# Patient Record
Sex: MALE | Race: WHITE | HISPANIC OR LATINO | Employment: OTHER | URBAN - METROPOLITAN AREA
[De-identification: names, ages, dates, MRNs, and addresses within clinical notes are randomized per-mention and may not be internally consistent; named-entity substitution may affect disease eponyms.]

---

## 2023-04-19 ENCOUNTER — APPOINTMENT (EMERGENCY)
Dept: RADIOLOGY | Facility: HOSPITAL | Age: 83
End: 2023-04-19

## 2023-04-19 ENCOUNTER — APPOINTMENT (EMERGENCY)
Dept: CT IMAGING | Facility: HOSPITAL | Age: 83
End: 2023-04-19

## 2023-04-19 ENCOUNTER — HOSPITAL ENCOUNTER (EMERGENCY)
Facility: HOSPITAL | Age: 83
Discharge: HOME | End: 2023-04-20
Attending: EMERGENCY MEDICINE | Admitting: EMERGENCY MEDICINE

## 2023-04-19 DIAGNOSIS — S09.90XA CLOSED HEAD INJURY, INITIAL ENCOUNTER: Primary | ICD-10-CM

## 2023-04-19 LAB
ANION GAP SERPL CALCULATED.3IONS-SCNC: 7 MMOL/L (ref 4–13)
BASOPHILS # BLD AUTO: 0.06 THOUSANDS/ΜL (ref 0–0.1)
BASOPHILS NFR BLD AUTO: 1 % (ref 0–1)
BUN SERPL-MCNC: 12 MG/DL (ref 5–25)
CALCIUM SERPL-MCNC: 10 MG/DL (ref 8.4–10.2)
CHLORIDE SERPL-SCNC: 103 MMOL/L (ref 96–108)
CO2 SERPL-SCNC: 30 MMOL/L (ref 21–32)
CREAT SERPL-MCNC: 0.85 MG/DL (ref 0.6–1.3)
EOSINOPHIL # BLD AUTO: 0.28 THOUSAND/ΜL (ref 0–0.61)
EOSINOPHIL NFR BLD AUTO: 4 % (ref 0–6)
ERYTHROCYTE [DISTWIDTH] IN BLOOD BY AUTOMATED COUNT: 13.9 % (ref 11.6–15.1)
GFR SERPL CREATININE-BSD FRML MDRD: 81 ML/MIN/1.73SQ M
GLUCOSE SERPL-MCNC: 99 MG/DL (ref 65–140)
HCT VFR BLD AUTO: 44.6 % (ref 36.5–49.3)
HGB BLD-MCNC: 14.5 G/DL (ref 12–17)
IMM GRANULOCYTES # BLD AUTO: 0.05 THOUSAND/UL (ref 0–0.2)
IMM GRANULOCYTES NFR BLD AUTO: 1 % (ref 0–2)
LYMPHOCYTES # BLD AUTO: 1.31 THOUSANDS/ΜL (ref 0.6–4.47)
LYMPHOCYTES NFR BLD AUTO: 17 % (ref 14–44)
MCH RBC QN AUTO: 28.7 PG (ref 26.8–34.3)
MCHC RBC AUTO-ENTMCNC: 32.5 G/DL (ref 31.4–37.4)
MCV RBC AUTO: 88 FL (ref 82–98)
MONOCYTES # BLD AUTO: 0.65 THOUSAND/ΜL (ref 0.17–1.22)
MONOCYTES NFR BLD AUTO: 8 % (ref 4–12)
NEUTROPHILS # BLD AUTO: 5.59 THOUSANDS/ΜL (ref 1.85–7.62)
NEUTS SEG NFR BLD AUTO: 69 % (ref 43–75)
NRBC BLD AUTO-RTO: 0 /100 WBCS
PLATELET # BLD AUTO: 207 THOUSANDS/UL (ref 149–390)
PMV BLD AUTO: 10.3 FL (ref 8.9–12.7)
POTASSIUM SERPL-SCNC: 4.1 MMOL/L (ref 3.5–5.3)
RBC # BLD AUTO: 5.06 MILLION/UL (ref 3.88–5.62)
SODIUM SERPL-SCNC: 140 MMOL/L (ref 135–147)
WBC # BLD AUTO: 7.94 THOUSAND/UL (ref 4.31–10.16)

## 2023-04-19 RX ADMIN — TETANUS TOXOID, REDUCED DIPHTHERIA TOXOID AND ACELLULAR PERTUSSIS VACCINE, ADSORBED 0.5 ML: 5; 2.5; 8; 8; 2.5 SUSPENSION INTRAMUSCULAR at 22:13

## 2023-04-20 VITALS
RESPIRATION RATE: 20 BRPM | WEIGHT: 147.49 LBS | OXYGEN SATURATION: 99 % | HEART RATE: 57 BPM | SYSTOLIC BLOOD PRESSURE: 185 MMHG | DIASTOLIC BLOOD PRESSURE: 91 MMHG | TEMPERATURE: 97.6 F

## 2023-04-20 NOTE — ED PROVIDER NOTES
Emergency Department Trauma Note  Tiana Levy 81 y o  male MRN: [id number]  Unit/Bed#: ED-32/ED-32 Encounter: [id number]      Trauma Alert: Trauma Acuity: C  Model of Arrival:   via    Trauma Team: Current Providers  Attending Provider: Josy Balderrama DO  Registered Nurse: Zunilda Ashton RN  Consultants:     None      History of Present Illness     Chief Complaint:   Chief Complaint   Patient presents with   • Trauma     Look at donal     HPI:  Tiana Levy is a 81 y o  male who presents with fall from standing, dementia patient from nursing home  Struck head on the ground w/o LOC, takes aspirin daily hematoma to R forehead  At baseline mental status, GCS 14     Mechanism:Details of Incident: pt fell from standing at facility +HS hematoma on right forehead  -LOC GCS 14 hs of demienta +ASA DNR/DNI/NH Injury Date: 04/19/23        HPI  Review of Systems   Unable to perform ROS: Dementia   All other systems reviewed and are negative  Historical Information     Immunizations:   Immunization History   Administered Date(s) Administered   • Tdap 04/19/2023       No past medical history on file  No family history on file  No past surgical history on file  No existing history information found  No existing history information found  Family History: No family history on file  Meds/Allergies   None       Not on File    PHYSICAL EXAM    PE limited by: dementia    Objective   Vitals:   First set: Temperature: 97 6 °F (36 4 °C) (04/19/23 2131)  Pulse: 60 (04/19/23 2130)  Respirations: 20 (04/19/23 2131)  Blood Pressure: (!) 202/106 (04/19/23 2130)  SpO2: 96 % (04/19/23 2130)    Primary Survey:   (A) Airway: intact  (B) Breathing: cta b/l  (C) Circulation: Pulses:   normal  (D) Disabliity:  GCS Total:  14  (E) Expose:  Completed    Secondary Survey: (Click on Physical Exam tab above)  Physical Exam  Vitals and nursing note reviewed  Constitutional:       General: He is not in acute distress  Appearance: Normal appearance  He is normal weight  He is not ill-appearing, toxic-appearing or diaphoretic  HENT:      Head: Normocephalic  Comments: Right forehead/scalp hematoma  Right Ear: External ear normal       Left Ear: External ear normal       Nose: Nose normal  No congestion or rhinorrhea  Mouth/Throat:      Mouth: Mucous membranes are moist       Pharynx: Oropharynx is clear  Eyes:      General: No scleral icterus  Right eye: No discharge  Left eye: No discharge  Conjunctiva/sclera: Conjunctivae normal    Cardiovascular:      Rate and Rhythm: Normal rate and regular rhythm  Pulses: Normal pulses  Heart sounds: Normal heart sounds  Pulmonary:      Effort: Pulmonary effort is normal  No respiratory distress  Breath sounds: Normal breath sounds  Abdominal:      General: Abdomen is flat  Palpations: Abdomen is soft  Tenderness: There is no abdominal tenderness  Musculoskeletal:         General: No swelling  Normal range of motion  Cervical back: Normal range of motion and neck supple  Skin:     General: Skin is warm and dry  Capillary Refill: Capillary refill takes less than 2 seconds  Neurological:      General: No focal deficit present  Mental Status: He is alert  Mental status is at baseline  Cranial Nerves: No cranial nerve deficit  Sensory: No sensory deficit  Motor: No weakness  Coordination: Coordination normal       Comments: GCS 14, disoriented - baseline  Psychiatric:         Mood and Affect: Mood normal          Behavior: Behavior normal          Cervical spine cleared by clinical criteria?  No (imaging required)      Invasive Devices     None                 Lab Results:   Results Reviewed     Procedure Component Value Units Date/Time    Basic metabolic panel [[id number]] Collected: 04/19/23 2150    Lab Status: Final result Specimen: Blood from Arm, Right Updated: 04/19/23 2215     Sodium 140 mmol/L      Potassium 4 1 mmol/L      Chloride 103 mmol/L      CO2 30 mmol/L      ANION GAP 7 mmol/L      BUN 12 mg/dL      Creatinine 0 85 mg/dL      Glucose 99 mg/dL      Calcium 10 0 mg/dL      eGFR 81 ml/min/1 73sq m     Narrative:      [place] guidelines for Chronic Kidney Disease (CKD):   •  Stage 1 with normal or high GFR (GFR > 90 mL/min/1 73 square meters)  •  Stage 2 Mild CKD (GFR = 60-89 mL/min/1 73 square meters)  •  Stage 3A Moderate CKD (GFR = 45-59 mL/min/1 73 square meters)  •  Stage 3B Moderate CKD (GFR = 30-44 mL/min/1 73 square meters)  •  Stage 4 Severe CKD (GFR = 15-29 mL/min/1 73 square meters)  •  Stage 5 End Stage CKD (GFR <15 mL/min/1 73 square meters)  Note: GFR calculation is accurate only with a steady state creatinine    CBC and differential [[id number]] Collected: 04/19/23 2150    Lab Status: Final result Specimen: Blood from Arm, Right Updated: 04/19/23 2156     WBC 7 94 Thousand/uL      RBC 5 06 Million/uL      Hemoglobin 14 5 g/dL      Hematocrit 44 6 %      MCV 88 fL      MCH 28 7 pg      MCHC 32 5 g/dL      RDW 13 9 %      MPV 10 3 fL      Platelets 595 Thousands/uL      nRBC 0 /100 WBCs      Neutrophils Relative 69 %      Immat GRANS % 1 %      Lymphocytes Relative 17 %      Monocytes Relative 8 %      Eosinophils Relative 4 %      Basophils Relative 1 %      Neutrophils Absolute 5 59 Thousands/µL      Immature Grans Absolute 0 05 Thousand/uL      Lymphocytes Absolute 1 31 Thousands/µL      Monocytes Absolute 0 65 Thousand/µL      Eosinophils Absolute 0 28 Thousand/µL      Basophils Absolute 0 06 Thousands/µL                  Imaging Studies:   Direct to CT: Yes  XR shoulder 2+ views LEFT   ED Interpretation by Herminia Saenz DO (04/19 2342)   No acute abnormalities  Final Result by Rachana Hannah MD (04/20 0830)   No acute osseous abnormality            Workstation performed: [id number]         CT head without contrast   Final Result by Juan Tong Zarina Higuera MD (04/19 2324)      No acute intracranial abnormality  Workstation performed: [id number]         CT cervical spine without contrast   Final Result by Roselyn Hill MD (04/19 2321)      No cervical spine fracture or traumatic malalignment  Workstation performed: [id number]         XR chest 1 view portable   ED Interpretation by Julieta Gama DO (04/19 7389)   No acute abnormalities  Final Result by Roseline Helton MD (04/20 4420)   No acute cardiopulmonary findings  Workstation performed: [id number]               Procedures  Procedures         ED Course  ED Course as of 04/20/23 1907 Wed Apr 19, 2023   2306 Facility called requesting x-ray of his left shoulder, as they seem to feel he had pain in this region at the facility  For me, he does not seem to have left shoulder pain or deformity, however per their request will obtain x-ray  Medical Decision Making  80 yo M w/ ground level fall, head strike on aspirin  Trauma C activation  R forehead/scalp hematoma  CT head, c spine negative for acute traumatic injuries  CXR and L shoulder xray also normal  Stable for d/c back to his facility  Closed head injury, initial encounter: acute illness or injury  Amount and/or Complexity of Data Reviewed  Independent Historian: EMS  Labs: ordered  Radiology: ordered and independent interpretation performed  Risk  Prescription drug management                    Disposition  [facility] Transfer: No  Final diagnoses:   Closed head injury, initial encounter     Time reflects when diagnosis was documented in both MDM as applicable and the Disposition within this note     Time User Action Codes Description Comment    4/19/2023 11:43 PM Arielle Laws Add [S09 [id number]] Closed head injury, initial encounter       ED Disposition     ED Disposition   Discharge    Condition   Stable    Date/Time   Wed Apr 19, 2023 11:42 PM    Comment   Honey Kirk discharge to home/self care  Follow-up Information     Follow up With Specialties Details Why Ian, [address], Internal Medicine   [facility] [address] [phone]          There are no discharge medications for this patient  No discharge procedures on file      PDMP Review     None          ED Provider  Electronically Signed by         Julieta Gama DO  04/20/23 9538

## 2023-04-20 NOTE — ED NOTES
Pt roundtrip has been set up   And will be picked up at [address] [facility]  04/20/23 9050

## 2023-04-22 LAB
ATRIAL RATE: 60 BPM
P AXIS: 73 DEGREES
PR INTERVAL: 144 MS
QRS AXIS: -65 DEGREES
QRSD INTERVAL: 78 MS
QT INTERVAL: 456 MS
QTC INTERVAL: 456 MS
T WAVE AXIS: 68 DEGREES
VENTRICULAR RATE: 60 BPM

## 2023-06-30 ENCOUNTER — HOSPITAL ENCOUNTER (INPATIENT)
Facility: HOSPITAL | Age: 83
LOS: 4 days | Discharge: NURSING, REHAB OR LONG-TERM CARE | DRG: 871 | End: 2023-07-04
Attending: EMERGENCY MEDICINE | Admitting: FAMILY MEDICINE
Payer: MEDICARE

## 2023-06-30 ENCOUNTER — APPOINTMENT (EMERGENCY)
Dept: RADIOLOGY | Facility: HOSPITAL | Age: 83
DRG: 871 | End: 2023-06-30
Payer: MEDICARE

## 2023-06-30 DIAGNOSIS — A41.9 SEVERE SEPSIS (HCC): ICD-10-CM

## 2023-06-30 DIAGNOSIS — N39.0 UTI (URINARY TRACT INFECTION): ICD-10-CM

## 2023-06-30 DIAGNOSIS — R41.82 ALTERED MENTAL STATUS: Primary | ICD-10-CM

## 2023-06-30 DIAGNOSIS — H10.9 CONJUNCTIVITIS: ICD-10-CM

## 2023-06-30 DIAGNOSIS — R65.20 SEVERE SEPSIS (HCC): ICD-10-CM

## 2023-06-30 DIAGNOSIS — J18.9 PNEUMONIA: ICD-10-CM

## 2023-06-30 PROBLEM — G93.40 ENCEPHALOPATHY: Status: ACTIVE | Noted: 2023-06-30

## 2023-06-30 PROBLEM — I10 ESSENTIAL HYPERTENSION: Status: ACTIVE | Noted: 2023-06-30

## 2023-06-30 PROBLEM — Z71.89 GOALS OF CARE, COUNSELING/DISCUSSION: Status: ACTIVE | Noted: 2023-06-30

## 2023-06-30 PROBLEM — N30.00 ACUTE CYSTITIS WITHOUT HEMATURIA: Status: ACTIVE | Noted: 2023-06-30

## 2023-06-30 PROBLEM — F03.C0 SEVERE DEMENTIA (HCC): Status: ACTIVE | Noted: 2023-06-30

## 2023-06-30 PROBLEM — R91.8 HILAR MASS: Status: ACTIVE | Noted: 2023-06-30

## 2023-06-30 PROBLEM — J96.01 ACUTE RESPIRATORY FAILURE WITH HYPOXIA (HCC): Status: ACTIVE | Noted: 2023-06-30

## 2023-06-30 LAB
2HR DELTA HS TROPONIN: 1 NG/L
ALBUMIN SERPL BCP-MCNC: 3.7 G/DL (ref 3.5–5)
ALP SERPL-CCNC: 59 U/L (ref 34–104)
ALT SERPL W P-5'-P-CCNC: 7 U/L (ref 7–52)
ANION GAP SERPL CALCULATED.3IONS-SCNC: 10 MMOL/L
APTT PPP: 30 SECONDS (ref 23–37)
AST SERPL W P-5'-P-CCNC: 14 U/L (ref 13–39)
BACTERIA UR QL AUTO: ABNORMAL /HPF
BASOPHILS # BLD MANUAL: 0 THOUSAND/UL (ref 0–0.1)
BASOPHILS NFR MAR MANUAL: 0 % (ref 0–1)
BILIRUB SERPL-MCNC: 1.36 MG/DL (ref 0.2–1)
BILIRUB UR QL STRIP: ABNORMAL
BUN SERPL-MCNC: 21 MG/DL (ref 5–25)
CALCIUM SERPL-MCNC: 10.2 MG/DL (ref 8.4–10.2)
CARDIAC TROPONIN I PNL SERPL HS: 10 NG/L
CARDIAC TROPONIN I PNL SERPL HS: 11 NG/L
CHLORIDE SERPL-SCNC: 100 MMOL/L (ref 96–108)
CLARITY UR: ABNORMAL
CO2 SERPL-SCNC: 27 MMOL/L (ref 21–32)
COLOR UR: ABNORMAL
CREAT SERPL-MCNC: 0.91 MG/DL (ref 0.6–1.3)
EOSINOPHIL # BLD MANUAL: 0 THOUSAND/UL (ref 0–0.4)
EOSINOPHIL NFR BLD MANUAL: 0 % (ref 0–6)
ERYTHROCYTE [DISTWIDTH] IN BLOOD BY AUTOMATED COUNT: 13.7 % (ref 11.6–15.1)
FLUAV RNA RESP QL NAA+PROBE: NEGATIVE
FLUBV RNA RESP QL NAA+PROBE: NEGATIVE
GFR SERPL CREATININE-BSD FRML MDRD: 78 ML/MIN/1.73SQ M
GLUCOSE SERPL-MCNC: 147 MG/DL (ref 65–140)
GLUCOSE SERPL-MCNC: 158 MG/DL (ref 65–140)
GLUCOSE UR STRIP-MCNC: NEGATIVE MG/DL
HCT VFR BLD AUTO: 44.3 % (ref 36.5–49.3)
HGB BLD-MCNC: 14.7 G/DL (ref 12–17)
HGB UR QL STRIP.AUTO: ABNORMAL
INR PPP: 1.18 (ref 0.84–1.19)
KETONES UR STRIP-MCNC: ABNORMAL MG/DL
LACTATE SERPL-SCNC: 1.6 MMOL/L (ref 0.5–2)
LACTATE SERPL-SCNC: 2.6 MMOL/L (ref 0.5–2)
LACTATE SERPL-SCNC: 3.4 MMOL/L (ref 0.5–2)
LEUKOCYTE ESTERASE UR QL STRIP: ABNORMAL
LYMPHOCYTES # BLD AUTO: 0.39 THOUSAND/UL (ref 0.6–4.47)
LYMPHOCYTES # BLD AUTO: 3 % (ref 14–44)
MAGNESIUM SERPL-MCNC: 1.9 MG/DL (ref 1.9–2.7)
MCH RBC QN AUTO: 29.5 PG (ref 26.8–34.3)
MCHC RBC AUTO-ENTMCNC: 33.2 G/DL (ref 31.4–37.4)
MCV RBC AUTO: 89 FL (ref 82–98)
MONOCYTES # BLD AUTO: 0.13 THOUSAND/UL (ref 0–1.22)
MONOCYTES NFR BLD: 1 % (ref 4–12)
NEUTROPHILS # BLD MANUAL: 12.45 THOUSAND/UL (ref 1.85–7.62)
NEUTS BAND NFR BLD MANUAL: 14 % (ref 0–8)
NEUTS SEG NFR BLD AUTO: 82 % (ref 43–75)
NITRITE UR QL STRIP: POSITIVE
NON-SQ EPI CELLS URNS QL MICRO: ABNORMAL /HPF
PH UR STRIP.AUTO: 8 [PH]
PLATELET # BLD AUTO: 236 THOUSANDS/UL (ref 149–390)
PLATELET BLD QL SMEAR: ADEQUATE
PMV BLD AUTO: 10.8 FL (ref 8.9–12.7)
POTASSIUM SERPL-SCNC: 4.2 MMOL/L (ref 3.5–5.3)
PROCALCITONIN SERPL-MCNC: 0.42 NG/ML
PROT SERPL-MCNC: 7.1 G/DL (ref 6.4–8.4)
PROT UR STRIP-MCNC: ABNORMAL MG/DL
PROTHROMBIN TIME: 15.2 SECONDS (ref 11.6–14.5)
RBC # BLD AUTO: 4.99 MILLION/UL (ref 3.88–5.62)
RBC #/AREA URNS AUTO: ABNORMAL /HPF
RBC MORPH BLD: NORMAL
RSV RNA RESP QL NAA+PROBE: NEGATIVE
SARS-COV-2 RNA RESP QL NAA+PROBE: NEGATIVE
SODIUM SERPL-SCNC: 137 MMOL/L (ref 135–147)
SP GR UR STRIP.AUTO: 1.01 (ref 1–1.03)
TSH SERPL DL<=0.05 MIU/L-ACNC: 1.54 UIU/ML (ref 0.45–4.5)
UROBILINOGEN UR QL STRIP.AUTO: 1 E.U./DL
WBC # BLD AUTO: 12.97 THOUSAND/UL (ref 4.31–10.16)
WBC #/AREA URNS AUTO: ABNORMAL /HPF

## 2023-06-30 PROCEDURE — 87449 NOS EACH ORGANISM AG IA: CPT | Performed by: FAMILY MEDICINE

## 2023-06-30 PROCEDURE — 94640 AIRWAY INHALATION TREATMENT: CPT

## 2023-06-30 PROCEDURE — 71045 X-RAY EXAM CHEST 1 VIEW: CPT

## 2023-06-30 PROCEDURE — 82948 REAGENT STRIP/BLOOD GLUCOSE: CPT

## 2023-06-30 PROCEDURE — G1004 CDSM NDSC: HCPCS

## 2023-06-30 PROCEDURE — 71250 CT THORAX DX C-: CPT

## 2023-06-30 PROCEDURE — 0241U HB NFCT DS VIR RESP RNA 4 TRGT: CPT | Performed by: EMERGENCY MEDICINE

## 2023-06-30 PROCEDURE — 87040 BLOOD CULTURE FOR BACTERIA: CPT | Performed by: EMERGENCY MEDICINE

## 2023-06-30 PROCEDURE — 87186 SC STD MICRODIL/AGAR DIL: CPT | Performed by: EMERGENCY MEDICINE

## 2023-06-30 PROCEDURE — 84443 ASSAY THYROID STIM HORMONE: CPT | Performed by: EMERGENCY MEDICINE

## 2023-06-30 PROCEDURE — 87077 CULTURE AEROBIC IDENTIFY: CPT | Performed by: EMERGENCY MEDICINE

## 2023-06-30 PROCEDURE — 85027 COMPLETE CBC AUTOMATED: CPT | Performed by: EMERGENCY MEDICINE

## 2023-06-30 PROCEDURE — 96361 HYDRATE IV INFUSION ADD-ON: CPT

## 2023-06-30 PROCEDURE — 96365 THER/PROPH/DIAG IV INF INIT: CPT

## 2023-06-30 PROCEDURE — 83605 ASSAY OF LACTIC ACID: CPT | Performed by: FAMILY MEDICINE

## 2023-06-30 PROCEDURE — 94668 MNPJ CHEST WALL SBSQ: CPT

## 2023-06-30 PROCEDURE — 87086 URINE CULTURE/COLONY COUNT: CPT | Performed by: EMERGENCY MEDICINE

## 2023-06-30 PROCEDURE — 83735 ASSAY OF MAGNESIUM: CPT | Performed by: EMERGENCY MEDICINE

## 2023-06-30 PROCEDURE — 85007 BL SMEAR W/DIFF WBC COUNT: CPT | Performed by: EMERGENCY MEDICINE

## 2023-06-30 PROCEDURE — 36415 COLL VENOUS BLD VENIPUNCTURE: CPT | Performed by: EMERGENCY MEDICINE

## 2023-06-30 PROCEDURE — 84484 ASSAY OF TROPONIN QUANT: CPT | Performed by: EMERGENCY MEDICINE

## 2023-06-30 PROCEDURE — 80053 COMPREHEN METABOLIC PANEL: CPT | Performed by: EMERGENCY MEDICINE

## 2023-06-30 PROCEDURE — 81001 URINALYSIS AUTO W/SCOPE: CPT | Performed by: EMERGENCY MEDICINE

## 2023-06-30 PROCEDURE — 85730 THROMBOPLASTIN TIME PARTIAL: CPT | Performed by: EMERGENCY MEDICINE

## 2023-06-30 PROCEDURE — 84145 PROCALCITONIN (PCT): CPT | Performed by: FAMILY MEDICINE

## 2023-06-30 PROCEDURE — 94669 MECHANICAL CHEST WALL OSCILL: CPT

## 2023-06-30 PROCEDURE — 74176 CT ABD & PELVIS W/O CONTRAST: CPT

## 2023-06-30 PROCEDURE — 85610 PROTHROMBIN TIME: CPT | Performed by: EMERGENCY MEDICINE

## 2023-06-30 PROCEDURE — 93005 ELECTROCARDIOGRAM TRACING: CPT

## 2023-06-30 PROCEDURE — 70450 CT HEAD/BRAIN W/O DYE: CPT

## 2023-06-30 PROCEDURE — 87081 CULTURE SCREEN ONLY: CPT | Performed by: FAMILY MEDICINE

## 2023-06-30 PROCEDURE — 83605 ASSAY OF LACTIC ACID: CPT | Performed by: EMERGENCY MEDICINE

## 2023-06-30 PROCEDURE — 94760 N-INVAS EAR/PLS OXIMETRY 1: CPT

## 2023-06-30 PROCEDURE — 99223 1ST HOSP IP/OBS HIGH 75: CPT | Performed by: FAMILY MEDICINE

## 2023-06-30 PROCEDURE — 99285 EMERGENCY DEPT VISIT HI MDM: CPT

## 2023-06-30 RX ORDER — LEVOTHYROXINE SODIUM 0.05 MG/1
50 TABLET ORAL DAILY
COMMUNITY

## 2023-06-30 RX ORDER — DIVALPROEX SODIUM 250 MG/1
250 TABLET, DELAYED RELEASE ORAL EVERY 12 HOURS SCHEDULED
COMMUNITY

## 2023-06-30 RX ORDER — CEFEPIME HYDROCHLORIDE 2 G/50ML
2000 INJECTION, SOLUTION INTRAVENOUS EVERY 12 HOURS
Status: DISCONTINUED | OUTPATIENT
Start: 2023-07-01 | End: 2023-06-30 | Stop reason: SDUPTHER

## 2023-06-30 RX ORDER — SENNA PLUS 8.6 MG/1
1 TABLET ORAL 2 TIMES DAILY
COMMUNITY

## 2023-06-30 RX ORDER — CEFEPIME HYDROCHLORIDE 2 G/50ML
2000 INJECTION, SOLUTION INTRAVENOUS ONCE
Status: COMPLETED | OUTPATIENT
Start: 2023-06-30 | End: 2023-06-30

## 2023-06-30 RX ORDER — VANCOMYCIN HYDROCHLORIDE 1 G/200ML
15 INJECTION, SOLUTION INTRAVENOUS ONCE
Status: DISCONTINUED | OUTPATIENT
Start: 2023-06-30 | End: 2023-06-30 | Stop reason: DRUGHIGH

## 2023-06-30 RX ORDER — IPRATROPIUM BROMIDE AND ALBUTEROL SULFATE 2.5; .5 MG/3ML; MG/3ML
3 SOLUTION RESPIRATORY (INHALATION)
Status: DISCONTINUED | OUTPATIENT
Start: 2023-06-30 | End: 2023-07-04 | Stop reason: HOSPADM

## 2023-06-30 RX ORDER — ACETAMINOPHEN 650 MG/1
650 SUPPOSITORY RECTAL ONCE
Status: COMPLETED | OUTPATIENT
Start: 2023-06-30 | End: 2023-06-30

## 2023-06-30 RX ORDER — DONEPEZIL HYDROCHLORIDE 5 MG/1
5 TABLET, FILM COATED ORAL
COMMUNITY

## 2023-06-30 RX ORDER — LABETALOL 100 MG/1
100 TABLET, FILM COATED ORAL 2 TIMES DAILY
COMMUNITY

## 2023-06-30 RX ORDER — QUETIAPINE FUMARATE 25 MG/1
25 TABLET, FILM COATED ORAL
COMMUNITY

## 2023-06-30 RX ORDER — HEPARIN SODIUM 5000 [USP'U]/ML
5000 INJECTION, SOLUTION INTRAVENOUS; SUBCUTANEOUS EVERY 8 HOURS SCHEDULED
Status: DISCONTINUED | OUTPATIENT
Start: 2023-06-30 | End: 2023-07-04 | Stop reason: HOSPADM

## 2023-06-30 RX ORDER — BISACODYL 10 MG
10 SUPPOSITORY, RECTAL RECTAL DAILY
Status: DISCONTINUED | OUTPATIENT
Start: 2023-06-30 | End: 2023-07-04 | Stop reason: HOSPADM

## 2023-06-30 RX ORDER — IPRATROPIUM BROMIDE AND ALBUTEROL SULFATE 2.5; .5 MG/3ML; MG/3ML
3 SOLUTION RESPIRATORY (INHALATION)
Status: DISCONTINUED | OUTPATIENT
Start: 2023-06-30 | End: 2023-06-30

## 2023-06-30 RX ORDER — ACETAMINOPHEN 650 MG/1
650 SUPPOSITORY RECTAL EVERY 6 HOURS PRN
Status: DISCONTINUED | OUTPATIENT
Start: 2023-06-30 | End: 2023-07-04 | Stop reason: HOSPADM

## 2023-06-30 RX ORDER — AMLODIPINE BESYLATE 2.5 MG/1
2.5 TABLET ORAL DAILY
COMMUNITY
End: 2023-07-04

## 2023-06-30 RX ORDER — ACETAMINOPHEN 325 MG/1
650 TABLET ORAL ONCE
Status: DISCONTINUED | OUTPATIENT
Start: 2023-06-30 | End: 2023-06-30

## 2023-06-30 RX ORDER — SIMVASTATIN 40 MG
40 TABLET ORAL
COMMUNITY

## 2023-06-30 RX ADMIN — ACETAMINOPHEN 650 MG: 650 SUPPOSITORY RECTAL at 12:51

## 2023-06-30 RX ADMIN — VANCOMYCIN HYDROCHLORIDE 1500 MG: 10 INJECTION, POWDER, LYOPHILIZED, FOR SOLUTION INTRAVENOUS at 14:32

## 2023-06-30 RX ADMIN — IPRATROPIUM BROMIDE AND ALBUTEROL SULFATE 3 ML: 2.5; .5 SOLUTION RESPIRATORY (INHALATION) at 19:57

## 2023-06-30 RX ADMIN — SODIUM CHLORIDE 1000 ML: 0.9 INJECTION, SOLUTION INTRAVENOUS at 15:15

## 2023-06-30 RX ADMIN — SODIUM CHLORIDE 1000 ML: 0.9 INJECTION, SOLUTION INTRAVENOUS at 14:00

## 2023-06-30 RX ADMIN — CEFEPIME HYDROCHLORIDE 2000 MG: 2 INJECTION, SOLUTION INTRAVENOUS at 13:24

## 2023-06-30 RX ADMIN — HEPARIN SODIUM 5000 UNITS: 5000 INJECTION INTRAVENOUS; SUBCUTANEOUS at 18:32

## 2023-06-30 RX ADMIN — SODIUM CHLORIDE 1000 ML: 0.9 INJECTION, SOLUTION INTRAVENOUS at 12:31

## 2023-06-30 RX ADMIN — SODIUM CHLORIDE 1000 ML: 0.9 INJECTION, SOLUTION INTRAVENOUS at 14:09

## 2023-06-30 NOTE — PLAN OF CARE
Problem: INFECTION - ADULT  Goal: Absence or prevention of progression during hospitalization  Description: INTERVENTIONS:  - Assess and monitor for signs and symptoms of infection  - Monitor lab/diagnostic results  - Monitor all insertion sites, i.e. indwelling lines, tubes, and drains  - Monitor endotracheal if appropriate and nasal secretions for changes in amount and color  - [place] appropriate cooling/warming therapies per order  - Administer medications as ordered  - Instruct and encourage patient and family to use good hand hygiene technique  - Identify and instruct in appropriate isolation precautions for identified infection/condition  Outcome: Progressing     Problem: Knowledge Deficit  Goal: Patient/family/caregiver demonstrates understanding of disease process, treatment plan, medications, and discharge instructions  Description: Complete learning assessment and assess knowledge base.   Interventions:  - Provide teaching at level of understanding  - Provide teaching via preferred learning methods  Outcome: Progressing

## 2023-06-30 NOTE — ASSESSMENT & PLAN NOTE
Patient with severe dementia and per daughter has been rapidly declining over the last 3 to 4 months  · D/W patient's daughter at bedside that his overall prognosis at this time is guarded. Per discussion with daughter, no aggressive treatments planned.   Continue with antibiotics to see if patient improves but if patient were to decline, daughter wants to be contacted and she will most likely transition him to comfort care/hospice at that time

## 2023-06-30 NOTE — ASSESSMENT & PLAN NOTE
Patient noted to have hilar mass in the left hilum  Per radiology CT scan from [facility] has been requested an addendum will be added  Daughter informed of this finding

## 2023-06-30 NOTE — PROGRESS NOTES
Cem Lino is a 81 y.o. male who is currently ordered Vancomycin IV with management by the Pharmacy Consult service. Relevant clinical data and objective / subjective history reviewed. Vancomycin Assessment:  Indication and Goal AUC/Trough: Pneumonia (goal -600, trough >10)  Clinical Status: 2stable  Micro:   Pending  Renal Function:  SCr: 0.91 mg/dL  CrCl: 56.1 mL/min  Renal replacement: Not on dialysis  Days of Therapy: 1  Current Dose: 1250 mg q24h  Vancomycin Plan:  New Dosing: No change   Estimated AUC:  499 mcg*hr/mL  Estimated Trough: 14.3 mcg/mL  Next Level: 7/1/23 0600  Renal Function Monitoring: Daily BMP and UOP  Pharmacy will continue to follow closely for s/sx of nephrotoxicity, infusion reactions and appropriateness of therapy. BMP and CBC will be ordered per protocol. We will continue to follow the patient’s culture results and clinical progress daily.     Sadaf Toney, Pharmacist

## 2023-06-30 NOTE — ED NOTES
Pt's respirations becoming more sonorous. Daughter and Dr. Sally Miller at bedside and aware.      Lico Yu RN  06/30/23 0923

## 2023-06-30 NOTE — ED PROVIDER NOTES
History  Chief Complaint   Patient presents with   • Altered Mental Status     Pt arrives from [facility] in [place] via EMS. Staff at [place] [facility] pt became nonresponsive 15 min before calling EMS. Pt has hx of dementia but usually can hold a conversation. Pt arrives with tremors that are not baseline. Pt is DNR/DNI per EMS. Upon arrival pt O2 sat on RA upon arrival was 80     36-year-old male presents with decreased responsiveness confused from the nursing home he has a history of dementia. Afebrile no reports of nausea vomiting abdominal pain diarrhea cough or any other symptoms reported. He is DNR/DNI. History provided by:  EMS personnel, nursing home and relative   used: No        Prior to Admission Medications   Prescriptions Last Dose Informant Patient Reported? Taking? QUEtiapine (SEROquel) 25 mg tablet 6/29/2023 at [address] [facility]) Yes Yes   Sig: Take 25 mg by mouth daily at bedtime Give 1 tablet by mouth at bedtime for psychosis   amLODIPine (NORVASC) 2.5 mg tablet 6/30/2023 at [address] ([address]) Yes Yes   Sig: Take 2.5 mg by mouth daily Give 1 tablet by mouth two times a day for HTN   aspirin (ECOTRIN LOW STRENGTH) 81 mg EC tablet 6/30/2023 at 0[address] Facility ([address]) Yes Yes   Sig: Take 81 mg by mouth daily Give 1 tablet by mouth one time a day for CAD   divalproex sodium (DEPAKOTE) 250 mg DR tablet 6/30/2023 at 0[address] Outside Facility (Specify) Yes Yes   Sig: Take 250 mg by mouth every 12 (twelve) hours Give 1 tablet by mouth two times a day for mood regulation.    donepezil (ARICEPT) 5 mg tablet 6/29/2023 at [address] Outside Facility (Specify) Yes Yes   Sig: Take 5 mg by mouth daily at bedtime Give 1 tablet by mouth one time a day for dementia   labetalol (NORMODYNE) 100 mg tablet 6/30/2023 at [address] Outside Facility (Specify) Yes Yes   Sig: Take 100 mg by mouth 2 (two) times a day Give 1 tablet by mouth two times a day for HTN hold for systolic BP less than 151 and pulse less than 60   levothyroxine 50 mcg tablet 6/30/2023 at [address] (Specify) Yes Yes   Sig: Take 50 mcg by mouth daily Give 1 tablet by mouth in the morning for hypothyroidism   senna (SENOKOT) 8.6 MG tablet 6/30/2023 Outside Facility (Specify) Yes Yes   Sig: Take 1 tablet by mouth 2 (two) times a day Give 1 tablet by mouth two times a day for constipation   simvastatin (ZOCOR) 40 mg tablet 6/29/2023 at [address] Outside Facility (Specify) Yes Yes   Sig: Take 40 mg by mouth daily at bedtime Give 1 tablet by mouth one time a day for hyperlipidemia      Facility-Administered Medications: None       History reviewed. No pertinent past medical history. History reviewed. No pertinent surgical history. History reviewed. No pertinent family history. I have reviewed and agree with the history as documented. E-Cigarette/Vaping   • E-Cigarette Use Never User      E-Cigarette/Vaping Substances     Social History     Tobacco Use   • Smoking status: Unknown   Vaping Use   • Vaping Use: Never used   Substance Use Topics   • Alcohol use: Not Currently   • Drug use: Not Currently       Review of Systems   Constitutional: Positive for chills and fever. HENT: Negative. Eyes: Negative. Respiratory: Negative. Cardiovascular: Negative. Gastrointestinal: Negative. Endocrine: Negative. Genitourinary: Negative. Musculoskeletal: Negative. Skin: Negative. Allergic/Immunologic: Negative. Neurological: Positive for weakness. Hematological: Negative. Psychiatric/Behavioral: Positive for confusion and decreased concentration. All other systems reviewed and are negative. Physical Exam  Physical Exam  Vitals and nursing note reviewed. Constitutional:       Appearance: Normal appearance. HENT:      Head: Normocephalic and atraumatic.       Nose: Nose normal.      Mouth/Throat:      Mouth: Mucous membranes are moist.   Eyes:      General: No visual field deficit. Extraocular Movements: Extraocular movements intact. Pupils: Pupils are equal, round, and reactive to light. Cardiovascular:      Rate and Rhythm: Normal rate and regular rhythm. Pulmonary:      Effort: Pulmonary effort is normal.      Breath sounds: Normal breath sounds. Abdominal:      General: Abdomen is flat. Bowel sounds are normal.      Palpations: Abdomen is soft. Musculoskeletal:         General: Normal range of motion. Cervical back: Normal range of motion and neck supple. Skin:     General: Skin is warm. Capillary Refill: Capillary refill takes less than 2 seconds. Neurological:      General: No focal deficit present. Mental Status: He is alert and oriented to person, place, and time. Mental status is at baseline. GCS: GCS eye subscore is 3. GCS verbal subscore is 3. GCS motor subscore is 3. Cranial Nerves: No cranial nerve deficit, dysarthria or facial asymmetry. Comments: Patient is arousable to deep stimulation otherwise nonverbal at the time does not follow commands. Protecting his airway. Psychiatric:         Mood and Affect: Mood normal.         Thought Content:  Thought content normal.         Vital Signs  ED Triage Vitals   Temperature Pulse Respirations Blood Pressure SpO2   06/30/23 1203 06/30/23 1203 06/30/23 1203 06/30/23 1207 06/30/23 1207   (!) 102.3 °F (39.1 °C) 96 20 145/70 (!) 80 %      Temp Source Heart Rate Source Patient Position - Orthostatic VS BP Location FiO2 (%)   06/30/23 1203 06/30/23 1203 06/30/23 1203 06/30/23 1203 --   Tympanic Monitor Lying Right arm       Pain Score       06/30/23 1251       Med Not Given for Pain - for MAR use only           Vitals:    06/30/23 1445 06/30/23 1500 06/30/23 1511 06/30/23 1525   BP: 140/65 117/59 (!) 86/50 105/58   Pulse: 89 84 73 88   Patient Position - Orthostatic VS: Lying Lying Lying Lying         Visual Acuity  Visual Acuity    Flowsheet Row Most Recent Value   L Pupil Size (mm) 3   R Pupil Size (mm) 3          ED Medications  Medications   acetaminophen (TYLENOL) tablet 650 mg (650 mg Oral Not Given 6/30/23 1230)   vancomycin (VANCOCIN) 1500 mg in sodium chloride 0.9% 250 mL IVPB (1,500 mg Intravenous New Bag 6/30/23 1432)   sodium chloride 0.9 % bolus 1,000 mL (1,000 mL Intravenous New Bag 6/30/23 1515)   sodium chloride 0.9 % bolus 1,000 mL (0 mL Intravenous Stopped 6/30/23 1331)   acetaminophen (TYLENOL) rectal suppository 650 mg (650 mg Rectal Given 6/30/23 1251)   cefepime (MAXIPIME) IVPB (premix in dextrose) 2,000 mg 50 mL (0 mg Intravenous Stopped 6/30/23 1354)   sodium chloride 0.9 % bolus 1,000 mL (0 mL Intravenous Stopped 6/30/23 1506)   sodium chloride 0.9 % bolus 1,000 mL (0 mL Intravenous Stopped 6/30/23 1500)       Diagnostic Studies  Results Reviewed     Procedure Component Value Units Date/Time    RBC Morphology Reflex Test [[id number]] Collected: 06/30/23 1230    Lab Status: Final result Specimen: Blood from Arm, Right Updated: 06/30/23 1501    HS Troponin I 2hr [[id number]]  (Normal) Collected: 06/30/23 1413    Lab Status: Final result Specimen: Blood from Arm, Right Updated: 06/30/23 1455     hs TnI 2hr 11 ng/L      Delta 2hr hsTnI 1 ng/L     Lactic acid 2 Hours [[id number]]  (Abnormal) Collected: 06/30/23 1413    Lab Status: Final result Specimen: Blood from Arm, Right Updated: 06/30/23 1454     LACTIC ACID 2.6 mmol/L     Narrative:      Result may be elevated if tourniquet was used during collection.     HS Troponin I 4hr [[id number]]     Lab Status: No result Specimen: Blood     CBC and differential [[id number]]  (Abnormal) Collected: 06/30/23 1230    Lab Status: Final result Specimen: Blood from Arm, Right Updated: 06/30/23 1402     WBC 12.97 Thousand/uL      RBC 4.99 Million/uL      Hemoglobin 14.7 g/dL      Hematocrit 44.3 %      MCV 89 fL      MCH 29.5 pg      MCHC 33.2 g/dL      RDW 13.7 %      MPV 10.8 fL      Platelets 822 Thousands/uL     Narrative: This is an appended report. These results have been appended to a previously verified report. Manual Differential(PHLEBS Do Not Order) [[id number]]  (Abnormal) Collected: 06/30/23 1230    Lab Status: Final result Specimen: Blood from Arm, Right Updated: 06/30/23 1402     Segmented % 82 %      Bands % 14 %      Lymphocytes % 3 %      Monocytes % 1 %      Eosinophils, % 0 %      Basophils % 0 %      Absolute Neutrophils 12.45 Thousand/uL      Lymphocytes Absolute 0.39 Thousand/uL      Monocytes Absolute 0.13 Thousand/uL      Eosinophils Absolute 0.00 Thousand/uL      Basophils Absolute 0.00 Thousand/uL      Total Counted --     RBC Morphology Normal     Platelet Estimate Adequate    Blood culture #2 [[id number]] Collected: 06/30/23 1322    Lab Status: In process Specimen: Blood from Hand, Left Updated: 06/30/23 1402    FLU/RSV/COVID - if FLU/RSV clinically relevant [[id number]]  (Normal) Collected: 06/30/23 1230    Lab Status: Final result Specimen: Nares from Nose Updated: 06/30/23 1352     SARS-CoV-2 Negative     INFLUENZA A PCR Negative     INFLUENZA B PCR Negative     RSV PCR Negative    Narrative:      FOR PEDIATRIC PATIENTS - copy/paste COVID Guidelines URL to browser: [email or web address]    SARS-CoV-2 assay is a Nucleic Acid Amplification assay intended for the  qualitative detection of nucleic acid from SARS-CoV-2 in nasopharyngeal  swabs. Results are for the presumptive identification of SARS-CoV-2 RNA. Positive results are indicative of infection with SARS-CoV-2, the virus  causing COVID-19, but do not rule out bacterial infection or co-infection  with other viruses. Laboratories within the [facility] and its  territories are required to report all positive results to the appropriate  public health authorities.  Negative results do not preclude SARS-CoV-2  infection and should not be used as the sole basis for treatment or other  patient management decisions. Negative results must be combined with  clinical observations, patient history, and epidemiological information. This test has not been FDA cleared or approved. This test has been authorized by FDA under an Emergency Use Authorization  (EUA). This test is only authorized for the duration of time the  declaration that circumstances exist justifying the authorization of the  emergency use of an in vitro diagnostic tests for detection of SARS-CoV-2  virus and/or diagnosis of COVID-19 infection under section 564(b)(1) of  the Act, 21 U. S.C. 508RRT-3(W)(8), unless the authorization is terminated  or revoked sooner. The test has been validated but independent review by FDA  and CLIA is pending. Test performed using [employer] GeneXpert: This RT-PCR assay targets N2,  a region unique to SARS-CoV-2. A conserved region in the E-gene was chosen  for pan-Sarbecovirus detection which includes SARS-CoV-2. According to CMS-2020-01-R, this platform meets the definition of high-throughput technology. Protime-INR [[id number]]  (Abnormal) Collected: 06/30/23 1230    Lab Status: Final result Specimen: Blood from Arm, Right Updated: 06/30/23 1350     Protime 15.2 seconds      INR 1.18    APTT [[id number]]  (Normal) Collected: 06/30/23 1230    Lab Status: Final result Specimen: Blood from Arm, Right Updated: 06/30/23 1350     PTT 30 seconds     Lactic acid, plasma (w/reflex if result > 2.0) [[id number]]  (Abnormal) Collected: 06/30/23 1230    Lab Status: Final result Specimen: Blood from Arm, Right Updated: 06/30/23 1346     LACTIC ACID 3.4 mmol/L     Narrative:      Result may be elevated if tourniquet was used during collection.     Urine Microscopic [[id number]]  (Abnormal) Collected: 06/30/23 1316    Lab Status: Final result Specimen: Urine, Clean Catch Updated: 06/30/23 1330     RBC, UA       Field obscured, unable to enumerate     /hpf     WBC, UA Innumerable /hpf      Epithelial Cells       Field obscured, unable to enumerate     /hpf     Bacteria, UA Innumerable /hpf     TSH [[id number]]  (Normal) Collected: 06/30/23 1230    Lab Status: Final result Specimen: Blood from Arm, Right Updated: 06/30/23 1326     TSH 3RD GENERATON 1.544 uIU/mL     Urine culture [[id number]] Collected: 06/30/23 1316    Lab Status:  In process Specimen: Urine, Clean Catch Updated: 06/30/23 1326    UA (URINE) with reflex to Scope [[id number]]  (Abnormal) Collected: 06/30/23 1316    Lab Status: Final result Specimen: Urine, Clean Catch Updated: 06/30/23 1323     Color, UA Latisha     Clarity, UA Cloudy     Specific Gravity, UA 1.015     pH, UA 8.0     Leukocytes, UA Small     Nitrite, UA Positive     Protein,  (2+) mg/dl      Glucose, UA Negative mg/dl      Ketones, UA 15 (1+) mg/dl      Urobilinogen, UA 1.0 E.U./dl      Bilirubin, UA Small     Occult Blood, UA Moderate    HS Troponin 0hr (reflex protocol) [[id number]]  (Normal) Collected: 06/30/23 1230    Lab Status: Final result Specimen: Blood from Arm, Right Updated: 06/30/23 1319     hs TnI 0hr 10 ng/L     Comprehensive metabolic panel [[id number]]  (Abnormal) Collected: 06/30/23 1230    Lab Status: Final result Specimen: Blood from Arm, Right Updated: 06/30/23 1310     Sodium 137 mmol/L      Potassium 4.2 mmol/L      Chloride 100 mmol/L      CO2 27 mmol/L      ANION GAP 10 mmol/L      BUN 21 mg/dL      Creatinine 0.91 mg/dL      Glucose 147 mg/dL      Calcium 10.2 mg/dL      AST 14 U/L      ALT 7 U/L      Alkaline Phosphatase 59 U/L      Total Protein 7.1 g/dL      Albumin 3.7 g/dL      Total Bilirubin 1.36 mg/dL      eGFR 78 ml/min/1.73sq m     Narrative:      [place] guidelines for Chronic Kidney Disease (CKD):   •  Stage 1 with normal or high GFR (GFR > 90 mL/min/1.73 square meters)  •  Stage 2 Mild CKD (GFR = 60-89 mL/min/1.73 square meters)  •  Stage 3A Moderate CKD (GFR = 45-59 mL/min/1.73 square meters)  •  Stage 3B Moderate CKD (GFR = 30-44 mL/min/1.73 square meters)  •  Stage 4 Severe CKD (GFR = 15-29 mL/min/1.73 square meters)  •  Stage 5 End Stage CKD (GFR <15 mL/min/1.73 square meters)  Note: GFR calculation is accurate only with a steady state creatinine    Magnesium [[id number]]  (Normal) Collected: 06/30/23 1230    Lab Status: Final result Specimen: Blood from Arm, Right Updated: 06/30/23 1310     Magnesium 1.9 mg/dL     Blood culture #1 [[id number]] Collected: 06/30/23 1234    Lab Status: In process Specimen: Blood from Arm, Left Updated: 06/30/23 1244    Fingerstick Glucose (POCT) [[id number]]  (Abnormal) Collected: 06/30/23 1209    Lab Status: Final result Updated: 06/30/23 1214     POC Glucose 158 mg/dl                  CT head without contrast   Final Result by Amada Muñoz MD (06/30 1436)      No acute intracranial abnormality.                   Workstation performed: [id number]         XR chest 1 view portable   ED Interpretation by Leonel Luevano DO (06/30 1250)   Left lower infiltrate      Final Result by Celi Pfeiffer MD (06/30 1302)      Development of suspected left basal infiltrate               Workstation performed: [id number]         CT chest abdomen pelvis wo contrast    (Results Pending)              Procedures  ECG 12 Lead Documentation Only    Date/Time: 6/30/2023 3:33 PM    Performed by: Leonel Luevano DO  Authorized by: Leonel Luevano DO    ECG reviewed by me, the ED Provider: yes    Patient location:  ED  Previous ECG:     Previous ECG:  Unavailable    Comparison to cardiac monitor: Yes    Interpretation:     Interpretation: non-specific    Rate:     ECG rate assessment: normal    Rhythm:     Rhythm: sinus rhythm    Ectopy:     Ectopy: none    QRS:     QRS axis:  Normal  Conduction:     Conduction: normal    ST segments:     ST segments:  Non-specific  T waves:     T waves: non-specific    CriticalCare Time    Date/Time: 6/30/2023 3:34 PM    Performed by: Leonel Luevano DO  Authorized by: Leonel Luevano DO    Critical care provider statement:     Critical care time (minutes):  45    Critical care was necessary to treat or prevent imminent or life-threatening deterioration of the following conditions:  Metabolic crisis    Critical care was time spent personally by me on the following activities:  Blood draw for specimens, obtaining history from patient or surrogate, development of treatment plan with patient or surrogate, discussions with primary provider, evaluation of patient's response to treatment, discussions with consultants, examination of patient, interpretation of cardiac output measurements, ordering and performing treatments and interventions, ordering and review of laboratory studies, ordering and review of radiographic studies, re-evaluation of patient's condition and review of old charts    I assumed direction of critical care for this patient from another provider in my specialty: no               ED Course                               SBIRT 22yo+    Flowsheet Row Most Recent Value   Initial Alcohol Screen: US AUDIT-C     1. How often do you have a drink containing alcohol? 0 Filed at: 06/30/2023 1203   2. How many drinks containing alcohol do you have on a typical day you are drinking? 0 Filed at: 06/30/2023 1203   3a. Male UNDER 65: How often do you have five or more drinks on one occasion? 0 Filed at: 06/30/2023 1203   3b. FEMALE Any Age, or MALE 65+: How often do you have 4 or more drinks on one occassion? 0 Filed at: 06/30/2023 1203   Audit-C Score 0 Filed at: 06/30/2023 1203   JUJU: How many times in the past year have you. .. Used an illegal drug or used a prescription medication for non-medical reasons? Never Filed at: 06/30/2023 1203                    Medical Decision Making  Patient was given IV antibiotics and fluids after 30 cc/kg patient's blood pressure was still tenuous. He was DNR/DNI ICU evaluated the patient at bedside. According to the ICU was discussion with the daughter no aggressive measures wanted. He is to get no central lines no ICU admitted if things get worse on the floor he is to be made comfort care according to the daughter. Altered mental status: acute illness or injury  Pneumonia: acute illness or injury  Severe sepsis [facility]): acute illness or injury  UTI (urinary tract infection): acute illness or injury  Amount and/or Complexity of Data Reviewed  Independent Historian: EMS  External Data Reviewed: labs, radiology, ECG and notes. Labs: ordered. Decision-making details documented in ED Course. Radiology: ordered and independent interpretation performed. Decision-making details documented in ED Course. ECG/medicine tests: ordered and independent interpretation performed. Decision-making details documented in ED Course. Risk  OTC drugs. Prescription drug management. Decision regarding hospitalization. Disposition  Final diagnoses: Altered mental status   Severe sepsis (HCC)   UTI (urinary tract infection)   Pneumonia     Time reflects when diagnosis was documented in both MDM as applicable and the Disposition within this note     Time User Action Codes Description Comment    6/30/2023  2:26 PM Anepu, Catrina Add [R41.82] Altered mental status     6/30/2023  2:26 PM Anepu, Catrina Add [A41.9,  R65.20] Severe sepsis ([address])     6/30/2023  2:26 PM Anepu, Catrina Add [N39.0] UTI (urinary tract infection)     6/30/2023  2:26 PM Anepu, Catrina Add [J18.9] Pneumonia       ED Disposition     ED Disposition   Admit    Condition   Stable    Date/Time   Fri Jun 30, 2023  2:26 PM    Comment               Follow-up Information    None         Patient's Medications   Discharge Prescriptions    No medications on file       No discharge procedures on file.     PDMP Review     None          ED Provider  Electronically Signed by           Alanis Hernández DO  06/30/23 6675

## 2023-06-30 NOTE — ED NOTES
Ricardo Isaacs NP from ICU, at bedside. Approves pt to be transported to the floor.      Emi Schlatter, RN  06/30/23 8477

## 2023-06-30 NOTE — ED NOTES
Pt's respirations more labored and audible secretions are heard. Pt was suctioned with moderate amount obtained. Pt's respirations less labored and audible secretions no longer heard.      Dilip Calderón RN  06/30/23 7203

## 2023-06-30 NOTE — ASSESSMENT & PLAN NOTE
Left-sided pneumonia on imaging  · Received a dose of cefepime and vancomycin in the ER which will be continued for now until cultures result  · Follow-up on pending MRSA screen.   Check sputum culture if possible, urine antigens  · Scheduled nebulizer treatment

## 2023-06-30 NOTE — ASSESSMENT & PLAN NOTE
Per Daughter, patient with severe dementia and is oriented to self only at baseline  Holding all p.o. medications while patient minimally responsive

## 2023-06-30 NOTE — ASSESSMENT & PLAN NOTE
O2 sat of 80% on room air prior ER documentation  Currently on 5 L by nasal cannula  Due to pneumonia +/- pulmonary edema

## 2023-06-30 NOTE — ED NOTES
Unable to straight cath pt using 15 fr. Peds cath 8 fr was used and was successful.      Alcides Rico RN  06/30/23 5243

## 2023-06-30 NOTE — QUICK NOTE
Progress Note - Triage Asssessment   Diamond De La Paz 81 y.o. male MRN: [id number]    Time Called ( Time): 7344  Date Called: 06/30/23  Room#: CT 2   Person requesting evaluation: Dr. Gavin Uriarte    Situation:    Patient came from complete care. He was reported to be minimally responsive by staff. EMS was activated. He does have a history of dementia but can usually hold a conversation per staff. Of note, he is a known DNR/DNI. Of note, his lactic acid was 3.4, WBC 12.9, and he met SIRS criteria for with a fever, tachypnea, and hypoxia. Urinalysis shows evidence of urinary tract infection as evidenced by leukocytes and nitrites. Additionally, CT chest shows  possible pneumonia. As per sepsis protocol, the patient received IV fluids, cefepime, and vancomycin. He continued to have mild drops in his blood pressure with systolics in the 08V but then was fluid responsive. Initially, he was evaluated for the medical surgical floor but there was a concern since his blood pressure dropped less than 90 a few times. ER attending Dr. Gavin Uriarte requested a goals of care conversation to be done by critical care since the patient's blood pressures were borderline and he may require further aggressive care in the future. On exam, patient was found resting comfortably with no signs or symptoms of distress noted. He was minimally responsive, but did respond to painful stimuli. Heart rate was normal sinus rhythm in the 80s. Patient was resting comfortably on 5 L nasal cannula with an oxygen saturation of 98%. At my assessment, his blood pressure was 105/58. Previous blood pressures were noted as follows:  86/50  117/59   140/65  162/74   113/58   133/62    Discussion had with myself and the patient's daughter Ricardo Isaacs. She stated that her  father has been slowly deteriorating.   She says he has suffered from sepsis in the past.  She understands that he has potentially sepsis in both his lungs and his urinary tract.  Discussion was had with her regarding aggressive care in the ICU, central line placement, vasopressors, intubation, etc.      The daughter stated that she was willing for her father to be admitted to the medical floor for IV fluids and antibiotics only. She understands that if her father were to deteriorate further, he would need further aggressive measures to which she refused all of including a central line. It was explained to her further that if her father deteriorates on the floor, the primary team would be advised to call her and at that time she would request to make him comfort care. In the meantime, she stated that she was going to update her other siblings to let them know  the current situation. Update 2 S. charge nurse, and ER attending Dr. Evan Hernandez. Dr. Evan Hernandez updated [facility] attending Dr. Jairo Ahuja who knows that if the patient would get worse, family does not want him to be transferred to ICU, they would want to be notified so they could make him to be comfort care. Interventions:   continue IV antibitoics and IVF         Triage Assessment:     Patient can be admitted to med-surg level of care    Recommendations discussed with Dr. Evan Hernandez, Dr. Paige Norton and Dr. Jairo Ahuja.

## 2023-06-30 NOTE — ASSESSMENT & PLAN NOTE
As noted by fever, tachypnea, leukocytosis, lactic acidosis  · Most likely due to left-sided pneumonia, UTI  · Follow-up pending blood cultures  · Lactic acid normalized with fluid boluses  · Patient with rales along with pulmonary edema on imaging.   Hold off on further fluids unless clinically indicated

## 2023-06-30 NOTE — ED NOTES
Multiple attempts at obtaining second set of blood cultures unsuccessful. Will attempt again prior to giving antibiotics.      Marifer Valle RN  06/30/23 7233

## 2023-06-30 NOTE — H&P
[id number] [facility]  H&P  Name: Miracle Deras 81 y.o. male I MRN: [id number]  Unit/Bed#: 2 [address] Date of Admission: 6/30/2023   Date of Service: 6/30/2023 I Hospital Day: 0      Assessment/Plan   * Severe sepsis [facility])  Assessment & Plan  As noted by fever, tachypnea, leukocytosis, lactic acidosis  · Most likely due to left-sided pneumonia, UTI  · Follow-up pending blood cultures  · Lactic acid normalized with fluid boluses  · Patient with rales along with pulmonary edema on imaging. Hold off on further fluids unless clinically indicated    Acute respiratory failure with hypoxia (HCC)  Assessment & Plan  O2 sat of 80% on room air prior ER documentation  Currently on 5 L by nasal cannula  Due to pneumonia +/- pulmonary edema        Pneumonia  Assessment & Plan  Left-sided pneumonia on imaging  · Received a dose of cefepime and vancomycin in the ER which will be continued for now until cultures result  · Follow-up on pending MRSA screen. Check sputum culture if possible, urine antigens  · Scheduled nebulizer treatment    Goals of care, counseling/discussion  Assessment & Plan  Patient with severe dementia and per daughter has been rapidly declining over the last 3 to 4 months  · D/W patient's daughter at bedside that his overall prognosis at this time is guarded. Per discussion with daughter, no aggressive treatments planned. Continue with antibiotics to see if patient improves but if patient were to decline, daughter wants to be contacted and she will most likely transition him to comfort care/hospice at that time    Acute cystitis without hematuria  Assessment & Plan  Based on UA.   As noted above on cefepime  Follow-up on pending urine culture    Hilar mass  Assessment & Plan  Patient noted to have hilar mass in the left hilum  Per radiology CT scan from [facility] has been requested an addendum will be added  Daughter informed of this finding    Essential hypertension  Assessment & Plan  Hold p.o. medications. Patient did become hypotensive in the ER  Monitor blood pressure    Severe dementia [facility])  Assessment & Plan  Per Daughter, patient with severe dementia and is oriented to self only at baseline  Holding all p.o. medications while patient minimally responsive    Encephalopathy  Assessment & Plan  Patient noted to be only responsive at nursing home. Patient does have baseline dementia but can usually hold a conversation  · Likely due to infection  · CT head negative for acute pathology        VTE Pharmacologic Prophylaxis:   Heparin  Code Status: Level 3 - DNAR and DNI   Discussion with family: Updated  (daughter) at bedside. Anticipated Length of Stay: Patient will be admitted on an inpatient basis with an anticipated length of stay of greater than 2 midnights secondary to Severe sepsis, pneumonia, UTI, encephalopathy. Total Time Spent on Date of Encounter in care of patient: 55 minutes This time was spent on one or more of the following: performing physical exam; counseling and coordination of care; obtaining or reviewing history; documenting in the medical record; reviewing/ordering tests, medications or procedures; communicating with other healthcare professionals and discussing with patient's family/caregivers. Chief Complaint: Lethargy    History of Present Illness:  Miracle Deras is a 81 y.o. male who presents with lethargy from complete care in [place]. Patient unable to provide any information so most of the information was obtained by review of ER records and by speaking with daughter at bedside. Patient was very minimally responsive there and was noted to have some tremors. Patient does have history of dementia and is confused at baseline but can usually have a conversation. Per daughter at bedside patient has been rapidly declining over the last 3 to 4 months and has also started conversating less.   In the ER he was noted to be hypoxic and then became hypotensive. He was evaluated by ICU staff as well. Review of Systems:  Review of Systems   Unable to perform ROS: Mental status change       Past Medical and Surgical History:   History reviewed. No pertinent past medical history. History reviewed. No pertinent surgical history. Meds/Allergies:  Prior to Admission medications    Medication Sig Start Date End Date Taking? Authorizing Provider   amLODIPine (NORVASC) 2.5 mg tablet Take 2.5 mg by mouth daily Give 1 tablet by mouth two times a day for HTN   Yes Historical Provider, MD   aspirin (ECOTRIN LOW STRENGTH) 81 mg EC tablet Take 81 mg by mouth daily Give 1 tablet by mouth one time a day for CAD   Yes Historical Provider, MD   divalproex sodium (DEPAKOTE) 250 mg DR tablet Take 250 mg by mouth every 12 (twelve) hours Give 1 tablet by mouth two times a day for mood regulation. Yes Historical Provider, MD   donepezil (ARICEPT) 5 mg tablet Take 5 mg by mouth daily at bedtime Give 1 tablet by mouth one time a day for dementia   Yes Historical Provider, MD   labetalol (NORMODYNE) 100 mg tablet Take 100 mg by mouth 2 (two) times a day Give 1 tablet by mouth two times a day for HTN hold for systolic BP less than 986 and pulse less than 60   Yes Historical Provider, MD   levothyroxine 50 mcg tablet Take 50 mcg by mouth daily Give 1 tablet by mouth in the morning for hypothyroidism   Yes Historical Provider, MD   QUEtiapine (SEROquel) 25 mg tablet Take 25 mg by mouth daily at bedtime Give 1 tablet by mouth at bedtime for psychosis   Yes Historical Provider, MD   senna (SENOKOT) 8.6 MG tablet Take 1 tablet by mouth 2 (two) times a day Give 1 tablet by mouth two times a day for constipation   Yes Historical Provider, MD   simvastatin (ZOCOR) 40 mg tablet Take 40 mg by mouth daily at bedtime Give 1 tablet by mouth one time a day for hyperlipidemia   Yes Historical Provider, MD     I have reviewed home medications using recent Epic encounter.     Allergies: Allergies   Allergen Reactions   • Shellfish-Derived Products - Food Allergy Other (See Comments)     Not able to assess       Social History:  Marital Status: Single   Occupation: none  Patient Pre-hospital Living Situation: [address]  Patient Pre-hospital Level of Mobility: wheelchair  Patient Pre-hospital Diet Restrictions: none  Substance Use History:   Social History     Substance and Sexual Activity   Alcohol Use Not Currently     Social History     Tobacco Use   Smoking Status Unknown   Smokeless Tobacco Not on file     Social History     Substance and Sexual Activity   Drug Use Not Currently       Family History:  History reviewed. No pertinent family history. Physical Exam:     Vitals:   Blood Pressure: 138/86 (06/30/23 1551)  Pulse: 94 (06/30/23 1551)  Temperature: 97.9 °F (36.6 °C) (06/30/23 1551)  Temp Source: Tympanic (06/30/23 1511)  Respirations: (!) 30 (06/30/23 1525)  Height: 5' 7" (170.2 cm) (06/30/23 1423)  Weight - Scale: 63.9 kg (140 lb 14 oz) (06/30/23 1203)  SpO2: 95 % (06/30/23 1551)    Physical Exam  Vitals reviewed. Constitutional:       General: He is in acute distress. Appearance: He is ill-appearing. Comments: Lethargic   HENT:      Head: Normocephalic and atraumatic. Cardiovascular:      Rate and Rhythm: Normal rate and regular rhythm. Pulmonary:      Breath sounds: Rhonchi and rales present. Comments: Decreased breath sounds bilaterally although patient with poor inspiratory effort. Tachypneic  Abdominal:      General: Bowel sounds are normal. There is no distension. Palpations: Abdomen is soft. Tenderness: There is no abdominal tenderness. Genitourinary:     Comments: Calderon in place  Musculoskeletal:      Right lower leg: No edema. Left lower leg: No edema.           Additional Data:     Lab Results:  Results from last 7 days   Lab Units 06/30/23  1230   WBC Thousand/uL 12.97*   HEMOGLOBIN g/dL 14.7   HEMATOCRIT % 44.3 PLATELETS Thousands/uL 236   BANDS PCT % 14*   LYMPHO PCT % 3*   MONO PCT % 1*   EOS PCT % 0     Results from last 7 days   Lab Units 06/30/23  1230   SODIUM mmol/L 137   POTASSIUM mmol/L 4.2   CHLORIDE mmol/L 100   CO2 mmol/L 27   BUN mg/dL 21   CREATININE mg/dL 0.91   ANION GAP mmol/L 10   CALCIUM mg/dL 10.2   ALBUMIN g/dL 3.7   TOTAL BILIRUBIN mg/dL 1.36*   ALK PHOS U/L 59   ALT U/L 7   AST U/L 14   GLUCOSE RANDOM mg/dL 147*     Results from last 7 days   Lab Units 06/30/23  1230   INR  1.18     Results from last 7 days   Lab Units 06/30/23  1209   POC GLUCOSE mg/dl 158*         Results from last 7 days   Lab Units 06/30/23  1413 06/30/23  1230   LACTIC ACID mmol/L 2.6* 3.4*       Lines/Drains:  Invasive Devices     Peripheral Intravenous Line  Duration           Peripheral IV 06/30/23 Distal;Right;Ventral (anterior) Forearm <1 day    Peripheral IV 06/30/23 Right Antecubital <1 day                    Imaging: Reviewed radiology reports from this admission including: chest CT scan, abdominal/pelvic CT and CT head  CT head without contrast   Final Result by Horton Riedel, MD (06/30 5568)      No acute intracranial abnormality. Workstation performed: [id number]         CT chest abdomen pelvis wo contrast   Final Result by Anamaria Elizabeth MD (06/30 1452)      Asymmetric pulmonary edema on the left, with left basilar consolidation likely aspiration pneumonitis or pneumonia. Fullness in the left hilum with narrowing of the proximal lingular and left lower lobe airways raising concern for a hilar mass. Patient had a chest CT at [facility] dated 1/21/2018 which has been requested for comparison, and a addendum will    be dictated to this report when those images arrive. Coronary artery calcifications which are an indicator of coronary artery disease. Bladder is collapsed, but there is stranding of the fat surrounding the bladder likely due to cystitis.       Fecal impaction. No bowel obstruction. Significant findings were relayed to Dr. Fidelia Mccabe  in the emergency department prior to this dictation. Workstation performed: [id number]         XR chest 1 view portable   ED Interpretation by Lul Bonilla DO (06/30 1250)   Left lower infiltrate      Final Result by Nava Renteria MD (06/30 1302)      Development of suspected left basal infiltrate               Workstation performed: [id number]             EKG and Other Studies Reviewed on Admission:   · EKG: Sinus rhythm with no acute ST elevation. ** Please Note: This note has been constructed using a voice recognition system.  **

## 2023-06-30 NOTE — ASSESSMENT & PLAN NOTE
Patient noted to be only responsive at nursing home.   Patient does have baseline dementia but can usually hold a conversation  · Likely due to infection  · CT head negative for acute pathology

## 2023-07-01 LAB
ALBUMIN SERPL BCP-MCNC: 2.9 G/DL (ref 3.5–5)
ALP SERPL-CCNC: 36 U/L (ref 34–104)
ALT SERPL W P-5'-P-CCNC: 6 U/L (ref 7–52)
ANION GAP SERPL CALCULATED.3IONS-SCNC: 5 MMOL/L
AST SERPL W P-5'-P-CCNC: 14 U/L (ref 13–39)
BILIRUB SERPL-MCNC: 0.59 MG/DL (ref 0.2–1)
BUN SERPL-MCNC: 20 MG/DL (ref 5–25)
CALCIUM ALBUM COR SERPL-MCNC: 9.9 MG/DL (ref 8.3–10.1)
CALCIUM SERPL-MCNC: 9 MG/DL (ref 8.4–10.2)
CHLORIDE SERPL-SCNC: 111 MMOL/L (ref 96–108)
CO2 SERPL-SCNC: 25 MMOL/L (ref 21–32)
CREAT SERPL-MCNC: 0.69 MG/DL (ref 0.6–1.3)
ERYTHROCYTE [DISTWIDTH] IN BLOOD BY AUTOMATED COUNT: 13.9 % (ref 11.6–15.1)
GFR SERPL CREATININE-BSD FRML MDRD: 88 ML/MIN/1.73SQ M
GLUCOSE SERPL-MCNC: 100 MG/DL (ref 65–140)
HCT VFR BLD AUTO: 35 % (ref 36.5–49.3)
HGB BLD-MCNC: 11.6 G/DL (ref 12–17)
L PNEUMO1 AG UR QL IA.RAPID: NEGATIVE
MCH RBC QN AUTO: 29.3 PG (ref 26.8–34.3)
MCHC RBC AUTO-ENTMCNC: 32.3 G/DL (ref 31.4–37.4)
MCV RBC AUTO: 91 FL (ref 82–98)
MRSA NOSE QL CULT: NORMAL
PLATELET # BLD AUTO: 139 THOUSANDS/UL (ref 149–390)
PMV BLD AUTO: 11 FL (ref 8.9–12.7)
POTASSIUM SERPL-SCNC: 3.9 MMOL/L (ref 3.5–5.3)
PROCALCITONIN SERPL-MCNC: 17.46 NG/ML
PROT SERPL-MCNC: 5.5 G/DL (ref 6.4–8.4)
RBC # BLD AUTO: 3.86 MILLION/UL (ref 3.88–5.62)
S PNEUM AG UR QL: NEGATIVE
SODIUM SERPL-SCNC: 141 MMOL/L (ref 135–147)
VANCOMYCIN SERPL-MCNC: 21.1 UG/ML (ref 10–20)
WBC # BLD AUTO: 9.78 THOUSAND/UL (ref 4.31–10.16)

## 2023-07-01 PROCEDURE — 94640 AIRWAY INHALATION TREATMENT: CPT

## 2023-07-01 PROCEDURE — 94669 MECHANICAL CHEST WALL OSCILL: CPT

## 2023-07-01 PROCEDURE — 94668 MNPJ CHEST WALL SBSQ: CPT

## 2023-07-01 PROCEDURE — 94760 N-INVAS EAR/PLS OXIMETRY 1: CPT

## 2023-07-01 PROCEDURE — 84145 PROCALCITONIN (PCT): CPT | Performed by: FAMILY MEDICINE

## 2023-07-01 PROCEDURE — 80053 COMPREHEN METABOLIC PANEL: CPT | Performed by: FAMILY MEDICINE

## 2023-07-01 PROCEDURE — 80202 ASSAY OF VANCOMYCIN: CPT | Performed by: FAMILY MEDICINE

## 2023-07-01 PROCEDURE — 99232 SBSQ HOSP IP/OBS MODERATE 35: CPT | Performed by: FAMILY MEDICINE

## 2023-07-01 PROCEDURE — 85027 COMPLETE CBC AUTOMATED: CPT | Performed by: FAMILY MEDICINE

## 2023-07-01 RX ORDER — SODIUM CHLORIDE, SODIUM LACTATE, POTASSIUM CHLORIDE, CALCIUM CHLORIDE 600; 310; 30; 20 MG/100ML; MG/100ML; MG/100ML; MG/100ML
50 INJECTION, SOLUTION INTRAVENOUS CONTINUOUS
Status: DISCONTINUED | OUTPATIENT
Start: 2023-07-02 | End: 2023-07-02

## 2023-07-01 RX ORDER — CIPROFLOXACIN HYDROCHLORIDE 3.5 MG/ML
1 SOLUTION/ DROPS TOPICAL 4 TIMES DAILY
Status: DISCONTINUED | OUTPATIENT
Start: 2023-07-01 | End: 2023-07-04 | Stop reason: HOSPADM

## 2023-07-01 RX ORDER — CIPROFLOXACIN HYDROCHLORIDE 3.5 MG/ML
1 SOLUTION/ DROPS TOPICAL 4 TIMES DAILY
Status: DISCONTINUED | OUTPATIENT
Start: 2023-07-01 | End: 2023-07-01

## 2023-07-01 RX ADMIN — IPRATROPIUM BROMIDE AND ALBUTEROL SULFATE 3 ML: 2.5; .5 SOLUTION RESPIRATORY (INHALATION) at 20:26

## 2023-07-01 RX ADMIN — HEPARIN SODIUM 5000 UNITS: 5000 INJECTION INTRAVENOUS; SUBCUTANEOUS at 15:16

## 2023-07-01 RX ADMIN — CIPROFLOXACIN 1 DROP: 3 SOLUTION OPHTHALMIC at 16:38

## 2023-07-01 RX ADMIN — CEFEPIME 2000 MG: 2 INJECTION, POWDER, FOR SOLUTION INTRAVENOUS at 01:38

## 2023-07-01 RX ADMIN — HEPARIN SODIUM 5000 UNITS: 5000 INJECTION INTRAVENOUS; SUBCUTANEOUS at 21:15

## 2023-07-01 RX ADMIN — VANCOMYCIN HYDROCHLORIDE 1250 MG: 10 INJECTION, POWDER, LYOPHILIZED, FOR SOLUTION INTRAVENOUS at 02:48

## 2023-07-01 RX ADMIN — CIPROFLOXACIN 1 DROP: 3 SOLUTION OPHTHALMIC at 21:15

## 2023-07-01 RX ADMIN — IPRATROPIUM BROMIDE AND ALBUTEROL SULFATE 3 ML: 2.5; .5 SOLUTION RESPIRATORY (INHALATION) at 07:18

## 2023-07-01 RX ADMIN — BISACODYL 10 MG: 10 SUPPOSITORY RECTAL at 08:54

## 2023-07-01 RX ADMIN — CEFEPIME 2000 MG: 2 INJECTION, POWDER, FOR SOLUTION INTRAVENOUS at 13:00

## 2023-07-01 RX ADMIN — VANCOMYCIN HYDROCHLORIDE 1500 MG: 10 INJECTION, POWDER, LYOPHILIZED, FOR SOLUTION INTRAVENOUS at 15:00

## 2023-07-01 RX ADMIN — IPRATROPIUM BROMIDE AND ALBUTEROL SULFATE 3 ML: 2.5; .5 SOLUTION RESPIRATORY (INHALATION) at 01:07

## 2023-07-01 RX ADMIN — IPRATROPIUM BROMIDE AND ALBUTEROL SULFATE 3 ML: 2.5; .5 SOLUTION RESPIRATORY (INHALATION) at 13:37

## 2023-07-01 RX ADMIN — HEPARIN SODIUM 5000 UNITS: 5000 INJECTION INTRAVENOUS; SUBCUTANEOUS at 06:29

## 2023-07-01 NOTE — ASSESSMENT & PLAN NOTE
O2 sat of 80% on room air prior ER documentation  Currently on 6 L by nasal cannula  Due to pneumonia +/- pulmonary edema

## 2023-07-01 NOTE — ASSESSMENT & PLAN NOTE
Per Daughter, patient with severe dementia and is oriented to self only at baseline  Holding all p.o. medications for now.

## 2023-07-01 NOTE — PLAN OF CARE
Problem: MOBILITY - ADULT  Goal: Maintain or return to baseline ADL function  Description: INTERVENTIONS:  -  Assess patient's ability to carry out ADLs; assess patient's baseline for ADL function and identify physical deficits which impact ability to perform ADLs (bathing, care of mouth/teeth, toileting, grooming, dressing, etc.)  - Assess/evaluate cause of self-care deficits   - Assess range of motion  - Assess patient's mobility; develop plan if impaired  - Assess patient's need for assistive devices and provide as appropriate  - Encourage maximum independence but intervene and supervise when necessary  - Involve family in performance of ADLs  - Assess for home care needs following discharge   - Consider OT consult to assist with ADL evaluation and planning for discharge  - Provide patient education as appropriate  Outcome: Progressing  Goal: Maintains/Returns to pre admission functional level  Description: INTERVENTIONS:  - Perform BMAT or MOVE assessment daily.   - Set and communicate daily mobility goal to care team and patient/family/caregiver. - Collaborate with rehabilitation services on mobility goals if consulted  - Perform Range of Motion  times a day. - Reposition patient every  hours.   - Dangle patient  times a day  - Stand patient  times a day  - Ambulate patient  times a day  - Out of bed to chair  times a day   - Out of bed for meals  times a day  - Out of bed for toileting  - Record patient progress and toleration of activity level   Outcome: Progressing     Problem: PAIN - ADULT  Goal: Verbalizes/displays adequate comfort level or baseline comfort level  Description: Interventions:  - Encourage patient to monitor pain and request assistance  - Assess pain using appropriate pain scale  - Administer analgesics based on type and severity of pain and evaluate response  - Implement non-pharmacological measures as appropriate and evaluate response  - Consider cultural and social influences on pain and pain management  - Notify physician/advanced practitioner if interventions unsuccessful or patient reports new pain  Outcome: Progressing     Problem: INFECTION - ADULT  Goal: Absence or prevention of progression during hospitalization  Description: INTERVENTIONS:  - Assess and monitor for signs and symptoms of infection  - Monitor lab/diagnostic results  - Monitor all insertion sites, i.e. indwelling lines, tubes, and drains  - Monitor endotracheal if appropriate and nasal secretions for changes in amount and color  - [place] appropriate cooling/warming therapies per order  - Administer medications as ordered  - Instruct and encourage patient and family to use good hand hygiene technique  - Identify and instruct in appropriate isolation precautions for identified infection/condition  Outcome: Progressing     Problem: SAFETY ADULT  Goal: Maintain or return to baseline ADL function  Description: INTERVENTIONS:  -  Assess patient's ability to carry out ADLs; assess patient's baseline for ADL function and identify physical deficits which impact ability to perform ADLs (bathing, care of mouth/teeth, toileting, grooming, dressing, etc.)  - Assess/evaluate cause of self-care deficits   - Assess range of motion  - Assess patient's mobility; develop plan if impaired  - Assess patient's need for assistive devices and provide as appropriate  - Encourage maximum independence but intervene and supervise when necessary  - Involve family in performance of ADLs  - Assess for home care needs following discharge   - Consider OT consult to assist with ADL evaluation and planning for discharge  - Provide patient education as appropriate  Outcome: Progressing  Goal: Maintains/Returns to pre admission functional level  Description: INTERVENTIONS:  - Perform BMAT or MOVE assessment daily.   - Set and communicate daily mobility goal to care team and patient/family/caregiver.    - Collaborate with rehabilitation services on mobility goals if consulted  - Perform Range of Motion  times a day. - Reposition patient every  hours.   - Dangle patient  times a day  - Stand patient  times a day  - Ambulate patient  times a day  - Out of bed to chair  times a day   - Out of bed for meals  times a day  - Out of bed for toileting  - Record patient progress and toleration of activity level   Outcome: Progressing  Goal: Patient will remain free of falls  Description: INTERVENTIONS:  - Educate patient/family on patient safety including physical limitations  - Instruct patient to call for assistance with activity   - Consult OT/PT to assist with strengthening/mobility   - Keep Call bell within reach  - Keep bed low and locked with side rails adjusted as appropriate  - Keep care items and personal belongings within reach  - Initiate and maintain comfort rounds  - Make Fall Risk Sign visible to staff  - Offer Toileting every  Hours, in advance of need  - Initiate/Maintain alarm  - Obtain necessary fall risk management equipment:   - Apply yellow socks and bracelet for high fall risk patients  - Consider moving patient to room near nurses station  Outcome: Progressing     Problem: DISCHARGE PLANNING  Goal: Discharge to home or other facility with appropriate resources  Description: INTERVENTIONS:  - Identify barriers to discharge w/patient and caregiver  - Arrange for needed discharge resources and transportation as appropriate  - Identify discharge learning needs (meds, wound care, etc.)  - Arrange for interpretive services to assist at discharge as needed  - Refer to Case Management Department for coordinating discharge planning if the patient needs post-hospital services based on physician/advanced practitioner order or complex needs related to functional status, cognitive ability, or social support system  Outcome: Progressing     Problem: Knowledge Deficit  Goal: Patient/family/caregiver demonstrates understanding of disease process, treatment plan, medications, and discharge instructions  Description: Complete learning assessment and assess knowledge base.   Interventions:  - Provide teaching at level of understanding  - Provide teaching via preferred learning methods  Outcome: Progressing     Problem: Prexisting or High Potential for Compromised Skin Integrity  Goal: Skin integrity is maintained or improved  Description: INTERVENTIONS:  - Identify patients at risk for skin breakdown  - Assess and monitor skin integrity  - Assess and monitor nutrition and hydration status  - Monitor labs   - Assess for incontinence   - Turn and reposition patient  - Assist with mobility/ambulation  - Relieve pressure over bony prominences  - Avoid friction and shearing  - Provide appropriate hygiene as needed including keeping skin clean and dry  - Evaluate need for skin moisturizer/barrier cream  - Collaborate with interdisciplinary team   - Patient/family teaching  - Consider wound care consult   Outcome: Progressing     Problem: RESPIRATORY - ADULT  Goal: Achieves optimal ventilation and oxygenation  Description: INTERVENTIONS:  - Assess for changes in respiratory status  - Assess for changes in mentation and behavior  - Position to facilitate oxygenation and minimize respiratory effort  - Oxygen administered by appropriate delivery if ordered  - Initiate smoking cessation education as indicated  - Encourage broncho-pulmonary hygiene including cough, deep breathe, Incentive Spirometry  - Assess the need for suctioning and aspirate as needed  - Assess and instruct to report SOB or any respiratory difficulty  - Respiratory Therapy support as indicated  Outcome: Progressing

## 2023-07-01 NOTE — ASSESSMENT & PLAN NOTE
Patient noted to have hilar mass in the left hilum  Per radiology CT scan from [facility] has been requested an addendum will be added  Daughter informed of this finding.

## 2023-07-01 NOTE — PROGRESS NOTES
Gaurav Padilla is a 81 y.o. male who is currently ordered Vancomycin IV with management by the Pharmacy Consult service. Relevant clinical data and objective / subjective history reviewed. Vancomycin Assessment:  Indication and Goal AUC/Trough: Pneumonia (goal -600, trough >10)  Clinical Status: stable  Micro: In progress  Renal Function:  SCr: 0.69 mg/dL  CrCl: 74 mL/min  Renal replacement: Not on dialysis  Days of Therapy: 2  Current Dose: 1250mg IV every 24 hours  Vancomycin Plan:  New Dosinmg IV every 24 hours  Estimated AUC: 482 mcg*hr/mL  Estimated Trough: 13.7 mcg/mL  Next Level: 06:00 23  Renal Function Monitoring: Daily BMP and [place] will continue to follow closely for s/sx of nephrotoxicity, infusion reactions and appropriateness of therapy. BMP and CBC will be ordered per protocol. We will continue to follow the patient’s culture results and clinical progress daily.     Osiris Gonzalez, Pharmacist

## 2023-07-01 NOTE — ASSESSMENT & PLAN NOTE
Patient with severe dementia and per daughter has been rapidly declining over the last 3 to 4 months  · D/W patient's daughter at bedside that his overall prognosis at this time is guarded. Per discussion with daughter, no aggressive treatments planned. Continue with antibiotics to see if patient improves but if patient were to decline, daughter wants to be contacted and she will most likely transition him to comfort care/hospice at that time.

## 2023-07-01 NOTE — ASSESSMENT & PLAN NOTE
Left-sided pneumonia on imaging  · Received a dose of cefepime and vancomycin in the ER which will be continued for now until cultures result  · Follow-up on pending MRSA screen. Check sputum culture if possible.  urine antigens neg  · Continue Scheduled nebulizer treatment

## 2023-07-01 NOTE — ASSESSMENT & PLAN NOTE
Patient noted to be only responsive at nursing home. Patient does have baseline dementia but can usually hold a conversation  · Likely due to infection.   · Mental Status seems better today  · CT head negative for acute pathology

## 2023-07-01 NOTE — ASSESSMENT & PLAN NOTE
Holding p.o. medications.   Patient did become hypotensive in the ER  BPs stable  Monitor blood pressures

## 2023-07-01 NOTE — ASSESSMENT & PLAN NOTE
As noted by fever, tachypnea, leukocytosis, lactic acidosis  · Most likely due to left-sided pneumonia, UTI  · Blood cultures neg so far  · Lactic acid normalized with fluid boluses  · Patient with rales along with pulmonary edema on imaging.   Hold off on further fluids unless clinically indicated

## 2023-07-01 NOTE — PROGRESS NOTES
[id number] [facility]  Progress Note  Name: Binh Ro  MRN: [id number]  Unit/Bed#: 2 [facility] 206 I Date of Admission: 6/30/2023   Date of Service: 7/1/2023 I Hospital Day: 1    Assessment/Plan   * Severe sepsis [facility])  Assessment & Plan  As noted by fever, tachypnea, leukocytosis, lactic acidosis  · Most likely due to left-sided pneumonia, UTI  · Blood cultures neg so far  · Lactic acid normalized with fluid boluses  · Patient with rales along with pulmonary edema on imaging. Hold off on further fluids unless clinically indicated    Acute respiratory failure with hypoxia (HCC)  Assessment & Plan  O2 sat of 80% on room air prior ER documentation  Currently on 6 L by nasal cannula  Due to pneumonia +/- pulmonary edema        Pneumonia  Assessment & Plan  Left-sided pneumonia on imaging  · Received a dose of cefepime and vancomycin in the ER which will be continued for now until cultures result  · Follow-up on pending MRSA screen. Check sputum culture if possible. urine antigens neg  · Continue Scheduled nebulizer treatment    Goals of care, counseling/discussion  Assessment & Plan  Patient with severe dementia and per daughter has been rapidly declining over the last 3 to 4 months  · D/W patient's daughter at bedside that his overall prognosis at this time is guarded. Per discussion with daughter, no aggressive treatments planned. Continue with antibiotics to see if patient improves but if patient were to decline, daughter wants to be contacted and she will most likely transition him to comfort care/hospice at that time. Acute cystitis without hematuria  Assessment & Plan  Based on UA. As noted above on cefepime  urine culture with gram-negative rods    Hilar mass  Assessment & Plan  Patient noted to have hilar mass in the left hilum  Per radiology CT scan from [facility] has been requested an addendum will be added  Daughter informed of this finding.     Essential hypertension  Assessment & Plan  Holding p.o. medications. Patient did become hypotensive in the ER  BPs stable  Monitor blood pressures    Severe dementia [facility])  Assessment & Plan  Per Daughter, patient with severe dementia and is oriented to self only at baseline  Holding all p.o. medications for now. Encephalopathy  Assessment & Plan  Patient noted to be only responsive at nursing home. Patient does have baseline dementia but can usually hold a conversation  · Likely due to infection. · Mental Status seems better today  · CT head negative for acute pathology         VTE Pharmacologic Prophylaxis: VTE Score: 3 Moderate Risk (Score 3-4) - Pharmacological DVT Prophylaxis Ordered: heparin. Patient Centered Rounds: I performed bedside rounds with nursing staff today. Discussions with Specialists or Other Care Team Provider: yes     Education and Discussions with Family / Patient: Updated  (daughter) at bedside. Total Time Spent on Date of Encounter in care of patient: 40 min This time was spent on one or more of the following: performing physical exam; counseling and coordination of care; obtaining or reviewing history; documenting in the medical record; reviewing/ordering tests, medications or procedures; communicating with other healthcare professionals and discussing with patient's family/caregivers. Current Length of Stay: 1 day(s)  Current Patient Status: Inpatient   Certification Statement: The patient will continue to require additional inpatient hospital stay due to Severe sepsis due to pneumonia, UTI, acute respiratory failure with hypoxia  Discharge Plan: Anticipate discharge in >72 hrs to rehab facility.     Code Status: Level 3 - DNAR and DNI    Subjective:     Patient more awake today although not answering any questions    Objective:     Vitals:   Temp (24hrs), Av.3 °F (37.4 °C), Min:97.9 °F (36.6 °C), Max:102.3 °F (39.1 °C)    Temp:  [97.9 °F (36.6 °C)-102.3 °F (39.1 °C)] 98.1 °F (36.7 °C)  HR: [73-96] 83  Resp:  [18-30] 18  BP: ()/(50-86) 126/59  SpO2:  [80 %-98 %] 93 %  Body mass index is 22.06 kg/m². Input and Output Summary (last 24 hours): Intake/Output Summary (Last 24 hours) at 7/1/2023 1141  Last data filed at 7/1/2023 0601  Gross per 24 hour   Intake 3050 ml   Output 450 ml   Net 2600 ml       Physical Exam:   Physical Exam  Vitals reviewed. Constitutional:       General: He is not in acute distress. Appearance: He is ill-appearing. HENT:      Head: Normocephalic and atraumatic. Eyes:      General:         Right eye: No discharge. Left eye: No discharge. Cardiovascular:      Rate and Rhythm: Normal rate and regular rhythm. Pulmonary:      Effort: No respiratory distress. Breath sounds: Rhonchi and rales present. No wheezing. Comments: Decreased breath sounds bilaterally although patient with poor inspiratory effort  Abdominal:      General: Bowel sounds are normal. There is no distension. Palpations: Abdomen is soft. Tenderness: There is no abdominal tenderness. Musculoskeletal:      Right lower leg: No edema. Left lower leg: No edema. Neurological:      Mental Status: He is alert.           Additional Data:     Labs:  Results from last 7 days   Lab Units 07/01/23  0615 06/30/23  1230   WBC Thousand/uL 9.78 12.97*   HEMOGLOBIN g/dL 11.6* 14.7   HEMATOCRIT % 35.0* 44.3   PLATELETS Thousands/uL 139* 236   BANDS PCT %  --  14*   LYMPHO PCT %  --  3*   MONO PCT %  --  1*   EOS PCT %  --  0     Results from last 7 days   Lab Units 07/01/23  0615   SODIUM mmol/L 141   POTASSIUM mmol/L 3.9   CHLORIDE mmol/L 111*   CO2 mmol/L 25   BUN mg/dL 20   CREATININE mg/dL 0.69   ANION GAP mmol/L 5   CALCIUM mg/dL 9.0   ALBUMIN g/dL 2.9*   TOTAL BILIRUBIN mg/dL 0.59   ALK PHOS U/L 36   ALT U/L 6*   AST U/L 14   GLUCOSE RANDOM mg/dL 100     Results from last 7 days   Lab Units 06/30/23  1230   INR  1.18     Results from last 7 days   Lab Units 06/30/23  1209   POC GLUCOSE mg/dl 158*         Results from last 7 days   Lab Units 07/01/23  0615 06/30/23  1721 06/30/23  1413 06/30/23  1230   LACTIC ACID mmol/L  --  1.6 2.6* 3.4*   PROCALCITONIN ng/ml 17.46*  --   --  0.42*       Lines/Drains:  Invasive Devices     Peripheral Intravenous Line  Duration           Peripheral IV 06/30/23 Distal;Right;Ventral (anterior) Forearm <1 day    Peripheral IV 06/30/23 Right Antecubital <1 day          Drain  Duration           External Urinary Catheter Medium <1 day                      Imaging: Reviewed radiology reports from this admission including: chest CT scan and abdominal/pelvic CT    Recent Cultures (last 7 days):   Results from last 7 days   Lab Units 06/30/23  1322 06/30/23  1316 06/30/23  1234   BLOOD CULTURE  Received in Microbiology Lab. Culture in Progress. --  Received in Microbiology Lab. Culture in Progress. URINE CULTURE   --  >100,000 cfu/ml Non lactose fermenting gram negative katie*  --        Last 24 Hours Medication List:   Current Facility-Administered Medications   Medication Dose Route Frequency Provider Last Rate   • acetaminophen  650 mg Rectal Q6H PRN Maria Luz Revankar, DO     • bisacodyl  10 mg Rectal Daily Maria Luz Revankar, DO     • cefepime  2 g Intravenous Q12H Maria Luz Revankar, DO 2,000 mg (07/01/23 0138)   • heparin (porcine)  5,000 Units Subcutaneous Q8H [address] Maria Luz Revankar, DO     • ipratropium-albuterol  3 mL Nebulization Q6H Maria Luz Revankar, DO     • vancomycin  1,500 mg Intravenous Q24H Maria Luz Revankar, DO          Today, Patient Was Seen By: Randi Singh DO    **Please Note: This note may have been constructed using a voice recognition system. **

## 2023-07-02 PROBLEM — H10.9 CONJUNCTIVITIS: Status: ACTIVE | Noted: 2023-07-02

## 2023-07-02 LAB
ANION GAP SERPL CALCULATED.3IONS-SCNC: 6 MMOL/L
BACTERIA UR CULT: ABNORMAL
BUN SERPL-MCNC: 17 MG/DL (ref 5–25)
CALCIUM SERPL-MCNC: 9.2 MG/DL (ref 8.4–10.2)
CHLORIDE SERPL-SCNC: 109 MMOL/L (ref 96–108)
CO2 SERPL-SCNC: 27 MMOL/L (ref 21–32)
CREAT SERPL-MCNC: 0.6 MG/DL (ref 0.6–1.3)
ERYTHROCYTE [DISTWIDTH] IN BLOOD BY AUTOMATED COUNT: 13.7 % (ref 11.6–15.1)
GFR SERPL CREATININE-BSD FRML MDRD: 93 ML/MIN/1.73SQ M
GLUCOSE SERPL-MCNC: 89 MG/DL (ref 65–140)
HCT VFR BLD AUTO: 32.2 % (ref 36.5–49.3)
HGB BLD-MCNC: 10.4 G/DL (ref 12–17)
MCH RBC QN AUTO: 29.1 PG (ref 26.8–34.3)
MCHC RBC AUTO-ENTMCNC: 32.3 G/DL (ref 31.4–37.4)
MCV RBC AUTO: 90 FL (ref 82–98)
PLATELET # BLD AUTO: 145 THOUSANDS/UL (ref 149–390)
PMV BLD AUTO: 10.6 FL (ref 8.9–12.7)
POTASSIUM SERPL-SCNC: 3.2 MMOL/L (ref 3.5–5.3)
PROCALCITONIN SERPL-MCNC: 9.89 NG/ML
RBC # BLD AUTO: 3.58 MILLION/UL (ref 3.88–5.62)
SODIUM SERPL-SCNC: 142 MMOL/L (ref 135–147)
WBC # BLD AUTO: 7.34 THOUSAND/UL (ref 4.31–10.16)

## 2023-07-02 PROCEDURE — 99232 SBSQ HOSP IP/OBS MODERATE 35: CPT | Performed by: FAMILY MEDICINE

## 2023-07-02 PROCEDURE — 94760 N-INVAS EAR/PLS OXIMETRY 1: CPT

## 2023-07-02 PROCEDURE — 92610 EVALUATE SWALLOWING FUNCTION: CPT | Performed by: SPEECH-LANGUAGE PATHOLOGIST

## 2023-07-02 PROCEDURE — 80048 BASIC METABOLIC PNL TOTAL CA: CPT | Performed by: FAMILY MEDICINE

## 2023-07-02 PROCEDURE — 94668 MNPJ CHEST WALL SBSQ: CPT

## 2023-07-02 PROCEDURE — 93005 ELECTROCARDIOGRAM TRACING: CPT

## 2023-07-02 PROCEDURE — 84145 PROCALCITONIN (PCT): CPT | Performed by: FAMILY MEDICINE

## 2023-07-02 PROCEDURE — 94640 AIRWAY INHALATION TREATMENT: CPT

## 2023-07-02 PROCEDURE — 94669 MECHANICAL CHEST WALL OSCILL: CPT

## 2023-07-02 PROCEDURE — 85027 COMPLETE CBC AUTOMATED: CPT | Performed by: FAMILY MEDICINE

## 2023-07-02 RX ORDER — POTASSIUM CHLORIDE 14.9 MG/ML
20 INJECTION INTRAVENOUS ONCE
Status: COMPLETED | OUTPATIENT
Start: 2023-07-02 | End: 2023-07-03

## 2023-07-02 RX ORDER — POTASSIUM CHLORIDE 29.8 MG/ML
40 INJECTION INTRAVENOUS ONCE
Status: DISCONTINUED | OUTPATIENT
Start: 2023-07-02 | End: 2023-07-02

## 2023-07-02 RX ORDER — NITROGLYCERIN 0.4 MG/1
0.4 TABLET SUBLINGUAL
COMMUNITY

## 2023-07-02 RX ORDER — LEVOTHYROXINE SODIUM 0.05 MG/1
50 TABLET ORAL DAILY
Status: DISCONTINUED | OUTPATIENT
Start: 2023-07-02 | End: 2023-07-04 | Stop reason: HOSPADM

## 2023-07-02 RX ORDER — LABETALOL 100 MG/1
100 TABLET, FILM COATED ORAL 2 TIMES DAILY
Status: DISCONTINUED | OUTPATIENT
Start: 2023-07-02 | End: 2023-07-02

## 2023-07-02 RX ORDER — LABETALOL 100 MG/1
100 TABLET, FILM COATED ORAL 2 TIMES DAILY
Status: DISCONTINUED | OUTPATIENT
Start: 2023-07-02 | End: 2023-07-04 | Stop reason: HOSPADM

## 2023-07-02 RX ORDER — MAGNESIUM SULFATE 1 G/100ML
1 INJECTION INTRAVENOUS ONCE
Status: COMPLETED | OUTPATIENT
Start: 2023-07-02 | End: 2023-07-03

## 2023-07-02 RX ORDER — DIVALPROEX SODIUM 250 MG/1
250 TABLET, DELAYED RELEASE ORAL EVERY 12 HOURS SCHEDULED
Status: DISCONTINUED | OUTPATIENT
Start: 2023-07-02 | End: 2023-07-04 | Stop reason: HOSPADM

## 2023-07-02 RX ORDER — PRAVASTATIN SODIUM 80 MG/1
80 TABLET ORAL
Status: DISCONTINUED | OUTPATIENT
Start: 2023-07-02 | End: 2023-07-04 | Stop reason: HOSPADM

## 2023-07-02 RX ORDER — DONEPEZIL HYDROCHLORIDE 5 MG/1
5 TABLET, FILM COATED ORAL
Status: DISCONTINUED | OUTPATIENT
Start: 2023-07-02 | End: 2023-07-04 | Stop reason: HOSPADM

## 2023-07-02 RX ORDER — ASPIRIN 81 MG/1
81 TABLET, CHEWABLE ORAL DAILY
Status: DISCONTINUED | OUTPATIENT
Start: 2023-07-02 | End: 2023-07-04 | Stop reason: HOSPADM

## 2023-07-02 RX ORDER — POTASSIUM CHLORIDE 14.9 MG/ML
20 INJECTION INTRAVENOUS ONCE
Status: COMPLETED | OUTPATIENT
Start: 2023-07-02 | End: 2023-07-02

## 2023-07-02 RX ADMIN — DIVALPROEX SODIUM 250 MG: 250 TABLET, DELAYED RELEASE ORAL at 15:11

## 2023-07-02 RX ADMIN — SODIUM CHLORIDE, SODIUM LACTATE, POTASSIUM CHLORIDE, AND CALCIUM CHLORIDE 50 ML/HR: .6; .31; .03; .02 INJECTION, SOLUTION INTRAVENOUS at 00:13

## 2023-07-02 RX ADMIN — IPRATROPIUM BROMIDE AND ALBUTEROL SULFATE 3 ML: 2.5; .5 SOLUTION RESPIRATORY (INHALATION) at 13:48

## 2023-07-02 RX ADMIN — HEPARIN SODIUM 5000 UNITS: 5000 INJECTION INTRAVENOUS; SUBCUTANEOUS at 13:01

## 2023-07-02 RX ADMIN — POTASSIUM CHLORIDE 20 MEQ: 14.9 INJECTION, SOLUTION INTRAVENOUS at 10:13

## 2023-07-02 RX ADMIN — POTASSIUM CHLORIDE 20 MEQ: 14.9 INJECTION, SOLUTION INTRAVENOUS at 13:00

## 2023-07-02 RX ADMIN — LEVOTHYROXINE SODIUM 50 MCG: 75 TABLET ORAL at 15:10

## 2023-07-02 RX ADMIN — IPRATROPIUM BROMIDE AND ALBUTEROL SULFATE 3 ML: 2.5; .5 SOLUTION RESPIRATORY (INHALATION) at 07:10

## 2023-07-02 RX ADMIN — HEPARIN SODIUM 5000 UNITS: 5000 INJECTION INTRAVENOUS; SUBCUTANEOUS at 05:03

## 2023-07-02 RX ADMIN — IPRATROPIUM BROMIDE AND ALBUTEROL SULFATE 3 ML: 2.5; .5 SOLUTION RESPIRATORY (INHALATION) at 01:05

## 2023-07-02 RX ADMIN — BISACODYL 10 MG: 10 SUPPOSITORY RECTAL at 09:38

## 2023-07-02 RX ADMIN — DIVALPROEX SODIUM 250 MG: 250 TABLET, DELAYED RELEASE ORAL at 21:36

## 2023-07-02 RX ADMIN — CIPROFLOXACIN 1 DROP: 3 SOLUTION OPHTHALMIC at 09:38

## 2023-07-02 RX ADMIN — PRAVASTATIN SODIUM 80 MG: 80 TABLET ORAL at 15:41

## 2023-07-02 RX ADMIN — ASPIRIN 81 MG CHEWABLE TABLET 81 MG: 81 TABLET CHEWABLE at 15:10

## 2023-07-02 RX ADMIN — POTASSIUM CHLORIDE 20 MEQ: 14.9 INJECTION, SOLUTION INTRAVENOUS at 20:46

## 2023-07-02 RX ADMIN — LABETALOL HYDROCHLORIDE 100 MG: 100 TABLET, FILM COATED ORAL at 17:03

## 2023-07-02 RX ADMIN — DONEPEZIL HYDROCHLORIDE 5 MG: 5 TABLET ORAL at 21:36

## 2023-07-02 RX ADMIN — HEPARIN SODIUM 5000 UNITS: 5000 INJECTION INTRAVENOUS; SUBCUTANEOUS at 21:36

## 2023-07-02 RX ADMIN — CIPROFLOXACIN 1 DROP: 3 SOLUTION OPHTHALMIC at 17:03

## 2023-07-02 RX ADMIN — CEFEPIME 2000 MG: 2 INJECTION, POWDER, FOR SOLUTION INTRAVENOUS at 12:21

## 2023-07-02 RX ADMIN — MAGNESIUM SULFATE HEPTAHYDRATE 1 G: 1 INJECTION, SOLUTION INTRAVENOUS at 20:46

## 2023-07-02 RX ADMIN — CIPROFLOXACIN 1 DROP: 3 SOLUTION OPHTHALMIC at 21:37

## 2023-07-02 RX ADMIN — IPRATROPIUM BROMIDE AND ALBUTEROL SULFATE 3 ML: 2.5; .5 SOLUTION RESPIRATORY (INHALATION) at 20:28

## 2023-07-02 RX ADMIN — CEFEPIME 2000 MG: 2 INJECTION, POWDER, FOR SOLUTION INTRAVENOUS at 00:07

## 2023-07-02 RX ADMIN — CIPROFLOXACIN 1 DROP: 3 SOLUTION OPHTHALMIC at 12:22

## 2023-07-02 NOTE — PROGRESS NOTES
[id number] [facility]  Progress Note  Name: Navneet Membreno  MRN: [id number]  Unit/Bed#: 2 [address] Date of Admission: 6/30/2023   Date of Service: 7/2/2023 I Hospital Day: 2    Assessment/Plan   * Severe sepsis [facility])  Assessment & Plan  As noted by fever, tachypnea, leukocytosis, lactic acidosis  · Most likely due to left-sided pneumonia, UTI  · Blood cultures neg so far  · Lactic acid normalized with fluid boluses  · Patient with rales previously on exam along with pulmonary edema on imaging. D/C IVF as patient now also taking po intake. Acute respiratory failure with hypoxia (HCC)  Assessment & Plan  O2 sat of 80% on room air prior ER documentation  Currently on 4 L by nasal cannula with O2 sat in high 90s  Due to pneumonia +/- pulm edema        Pneumonia  Assessment & Plan  Left-sided pneumonia on imaging  · Received a dose of cefepime and vancomycin in the ER. Continue cefepime. D/C vanco as MRSA screen neg. Check sputum culture if possible. urine antigens neg  · Continue scheduled nebulizer treatment    Goals of care, counseling/discussion  Assessment & Plan  Patient with severe dementia and per daughter has been rapidly declining over the last 3 to 4 months  · D/W patient's daughter at bedside that his overall prognosis at this time is guarded. Per discussion with daughter, no aggressive treatments planned. Continue with antibiotics to see if patient improves but if patient were to decline, daughter wants to be contacted and she will most likely transition him to comfort care/hospice at that time    Encephalopathy  Assessment & Plan  Patient noted to be only responsive at nursing home. Patient does have baseline dementia but can usually hold a conversation  · Likely due to infection. · Mental Status continues to improve and seems almost close to baseline per daughter today  · CT head negative for acute pathology    Acute cystitis without hematuria  Assessment & Plan  Based on UA.   As noted above on cefepime  urine culture with Proteus    Conjunctivitis  Assessment & Plan  Patient was having thick yellowish drainage from eyes. Per daughter, pt has had this in the past and was treated with eyedrops with resolution  Started on Cipro drops    Hilar mass  Assessment & Plan  Patient noted to have hilar mass in the left hilum  Per radiology CT scan from [facility] has been requested an addendum will be added  Daughter informed of this finding    Essential hypertension  Assessment & Plan  Restart labetalol. Blood pressure is now intermittently running high. Patient did become hypotensive in the ER  Monitor blood pressures    Severe dementia [facility])  Assessment & Plan  Per Daughter, patient with severe dementia and is oriented to self only at baseline  Restart Aricept. Hold Seroquel for another day           VTE Pharmacologic Prophylaxis: VTE Score: 3 Moderate Risk (Score 3-4) - Pharmacological DVT Prophylaxis Ordered: heparin. Patient Centered Rounds: I performed bedside rounds with nursing staff today. Discussions with Specialists or Other Care Team Provider: yes    Education and Discussions with Family / Patient: Updated  (daughter) at bedside. Total Time Spent on Date of Encounter in care of patient: 40 min This time was spent on one or more of the following: performing physical exam; counseling and coordination of care; obtaining or reviewing history; documenting in the medical record; reviewing/ordering tests, medications or procedures; communicating with other healthcare professionals and discussing with patient's family/caregivers. Current Length of Stay: 2 day(s)  Current Patient Status: Inpatient   Certification Statement: The patient will continue to require additional inpatient hospital stay due to Pneumonia, UTI  Discharge Plan: Anticipate discharge in 48-72 hrs to rehab facility. Code Status: Level 3 - DNAR and DNI    Subjective:     Patient more awake and alert today. Said hello. Per daughter appears almost back at baseline    Objective:     Vitals:   Temp (24hrs), Av.5 °F (36.9 °C), Min:96.6 °F (35.9 °C), Max:99.5 °F (37.5 °C)    Temp:  [96.6 °F (35.9 °C)-99.5 °F (37.5 °C)] 98.5 °F (36.9 °C)  HR:  [] 101  Resp:  [18-20] 20  BP: (135-197)/() 197/104  SpO2:  [93 %-98 %] 97 %  Body mass index is 22.06 kg/m². Input and Output Summary (last 24 hours): Intake/Output Summary (Last 24 hours) at 2023 1447  Last data filed at 2023 1221  Gross per 24 hour   Intake --   Output 725 ml   Net -725 ml       Physical Exam:   Physical Exam  Vitals reviewed. Constitutional:       General: He is not in acute distress. Appearance: He is ill-appearing (chronically). HENT:      Head: Normocephalic and atraumatic. Eyes:      General:         Right eye: No discharge. Left eye: No discharge. Conjunctiva/sclera: Conjunctivae normal.   Cardiovascular:      Rate and Rhythm: Normal rate and regular rhythm. Pulmonary:      Effort: No respiratory distress. Breath sounds: Rhonchi present. No wheezing or rales. Comments: Decreased breath sounds bilaterally although patient with poor inspiratory effort  Abdominal:      General: Bowel sounds are normal. There is no distension. Palpations: Abdomen is soft. Tenderness: There is no abdominal tenderness. Neurological:      Mental Status: He is alert. Additional Data:     Labs:  Results from last 7 days   Lab Units 23  0459 23  0615 23  1230   WBC Thousand/uL 7.34   < > 12.97*   HEMOGLOBIN g/dL 10.4*   < > 14.7   HEMATOCRIT % 32.2*   < > 44.3   PLATELETS Thousands/uL 145*   < > 236   BANDS PCT %  --   --  14*   LYMPHO PCT %  --   --  3*   MONO PCT %  --   --  1*   EOS PCT %  --   --  0    < > = values in this interval not displayed.      Results from last 7 days   Lab Units 23  0459 23  0615   SODIUM mmol/L 142 141   POTASSIUM mmol/L 3.2* 3.9   CHLORIDE mmol/L 109* 111*   CO2 mmol/L 27 25   BUN mg/dL 17 20   CREATININE mg/dL 0.60 0.69   ANION GAP mmol/L 6 5   CALCIUM mg/dL 9.2 9.0   ALBUMIN g/dL  --  2.9*   TOTAL BILIRUBIN mg/dL  --  0.59   ALK PHOS U/L  --  36   ALT U/L  --  6*   AST U/L  --  14   GLUCOSE RANDOM mg/dL 89 100     Results from last 7 days   Lab Units 06/30/23  1230   INR  1.18     Results from last 7 days   Lab Units 06/30/23  1209   POC GLUCOSE mg/dl 158*         Results from last 7 days   Lab Units 07/02/23  0459 07/01/23  0615 06/30/23  1721 06/30/23  1413 06/30/23  1230   LACTIC ACID mmol/L  --   --  1.6 2.6* 3.4*   PROCALCITONIN ng/ml 9.89* 17.46*  --   --  0.42*       Lines/Drains:  Invasive Devices     Peripheral Intravenous Line  Duration           Peripheral IV 06/30/23 Distal;Right;Ventral (anterior) Forearm 2 days    Peripheral IV 06/30/23 Right Antecubital 2 days          Drain  Duration           External Urinary Catheter Medium <1 day                      Imaging: No pertinent imaging reviewed. Recent Cultures (last 7 days):   Results from last 7 days   Lab Units 06/30/23  2045 06/30/23  1322 06/30/23  1316 06/30/23  1234   BLOOD CULTURE   --  No Growth at 24 hrs.  --  No Growth at 24 hrs.    URINE CULTURE   --   --  >100,000 cfu/ml Proteus mirabilis*  --    LEGIONELLA URINARY ANTIGEN  Negative  --   --   --        Last 24 Hours Medication List:   Current Facility-Administered Medications   Medication Dose Route Frequency Provider Last Rate   • acetaminophen  650 mg Rectal Q6H PRN Maria Luz Revankar, DO     • aspirin  81 mg Oral Daily Maria Luz Revankar, DO     • bisacodyl  10 mg Rectal Daily Maria Luz Revankar, DO     • cefepime  2 g Intravenous Q12H Maria Luz Revankar, DO 2,000 mg (07/02/23 1221)   • ciprofloxacin  1 drop Both Eyes 4x Daily Maria Luz Revankar, DO     • divalproex sodium  250 mg Oral Q12H [address] Maria Luz Revankar, DO     • donepezil  5 mg Oral HS Maria Luz Richter DO     • heparin (porcine)  5,000 Units Subcutaneous Q8H [address] Maria Luz Neelam, DO     • ipratropium-albuterol  3 mL Nebulization Q6H Maria Luz Guillenar, DO     • labetalol  100 mg Oral BID Maria Luz Guillenar, DO     • levothyroxine  50 mcg Oral Daily Maria Luz Guillenar, DO     • potassium chloride  20 mEq Intravenous Once Maria Luz Guillenar, DO 20 mEq (07/02/23 1300)   • pravastatin  80 mg Oral Daily With [id number] [address],           Today, Patient Was Seen By: Fuad Connell DO    **Please Note: This note may have been constructed using a voice recognition system. **

## 2023-07-02 NOTE — ASSESSMENT & PLAN NOTE
O2 sat of 80% on room air prior ER documentation  Currently on 4 L by nasal cannula with O2 sat in high 90s  Due to pneumonia +/- pulm edema

## 2023-07-02 NOTE — ASSESSMENT & PLAN NOTE
Restart labetalol. Blood pressure is now intermittently running high.   Patient did become hypotensive in the ER  Monitor blood pressures

## 2023-07-02 NOTE — PLAN OF CARE
Recommendations: puree/level 1 diet and nectar thick liquids     Recommended Form of Meds: crushed with puree     Aspiration precautions and compensatory swallowing strategies: upright posture, only feed when fully alert, slow rate of feeding, small bites/sips and alternating bites and sips    Dysphagia Goals: pt will tolerate puree with nectar thick without s/s of aspiration x3

## 2023-07-02 NOTE — ASSESSMENT & PLAN NOTE
Patient was having thick yellowish drainage from eyes.  Per daughter, pt has had this in the past and was treated with eyedrops with resolution  Started on Cipro drops

## 2023-07-02 NOTE — ASSESSMENT & PLAN NOTE
Per Daughter, patient with severe dementia and is oriented to self only at baseline  Restart Aricept.   Hold Seroquel for another day

## 2023-07-02 NOTE — CONSULTS
Vancomycin IV Pharmacy-to-Dose Consultation     Vancomycin has been discontinued. Pharmacy will sign off. Please contact or re-consult with questions.     Reji Maloney, Pharmacist

## 2023-07-02 NOTE — ASSESSMENT & PLAN NOTE
As noted by fever, tachypnea, leukocytosis, lactic acidosis  · Most likely due to left-sided pneumonia, UTI  · Blood cultures neg so far  · Lactic acid normalized with fluid boluses  · Patient with rales previously on exam along with pulmonary edema on imaging. D/C IVF as patient now also taking po intake.

## 2023-07-02 NOTE — PLAN OF CARE
Problem: MOBILITY - ADULT  Goal: Maintain or return to baseline ADL function  Description: INTERVENTIONS:  -  Assess patient's ability to carry out ADLs; assess patient's baseline for ADL function and identify physical deficits which impact ability to perform ADLs (bathing, care of mouth/teeth, toileting, grooming, dressing, etc.)  - Assess/evaluate cause of self-care deficits   - Assess range of motion  - Assess patient's mobility; develop plan if impaired  - Assess patient's need for assistive devices and provide as appropriate  - Encourage maximum independence but intervene and supervise when necessary  - Involve family in performance of ADLs  - Assess for home care needs following discharge   - Consider OT consult to assist with ADL evaluation and planning for discharge  - Provide patient education as appropriate  Outcome: Progressing  Goal: Maintains/Returns to pre admission functional level  Description: INTERVENTIONS:  - Perform BMAT or MOVE assessment daily.   - Set and communicate daily mobility goal to care team and patient/family/caregiver.    - Collaborate with rehabilitation services on mobility goals if consulted  - Out of bed for toileting  - Record patient progress and toleration of activity level   Outcome: Progressing     Problem: INFECTION - ADULT  Goal: Absence or prevention of progression during hospitalization  Description: INTERVENTIONS:  - Assess and monitor for signs and symptoms of infection  - Monitor lab/diagnostic results  - Monitor all insertion sites, i.e. indwelling lines, tubes, and drains  - Monitor endotracheal if appropriate and nasal secretions for changes in amount and color  - Crawford appropriate cooling/warming therapies per order  - Administer medications as ordered  - Instruct and encourage patient and family to use good hand hygiene technique  - Identify and instruct in appropriate isolation precautions for identified infection/condition  Outcome: Progressing Problem: SAFETY ADULT  Goal: Maintain or return to baseline ADL function  Description: INTERVENTIONS:  -  Assess patient's ability to carry out ADLs; assess patient's baseline for ADL function and identify physical deficits which impact ability to perform ADLs (bathing, care of mouth/teeth, toileting, grooming, dressing, etc.)  - Assess/evaluate cause of self-care deficits   - Assess range of motion  - Assess patient's mobility; develop plan if impaired  - Assess patient's need for assistive devices and provide as appropriate  - Encourage maximum independence but intervene and supervise when necessary  - Involve family in performance of ADLs  - Assess for home care needs following discharge   - Consider OT consult to assist with ADL evaluation and planning for discharge  - Provide patient education as appropriate  Outcome: Progressing  Goal: Maintains/Returns to pre admission functional level  Description: INTERVENTIONS:  - Perform BMAT or MOVE assessment daily.   - Set and communicate daily mobility goal to care team and patient/family/caregiver.    - Collaborate with rehabilitation services on mobility goals if consulted  - Out of bed for toileting  - Record patient progress and toleration of activity level   Outcome: Progressing     Problem: RESPIRATORY - ADULT  Goal: Achieves optimal ventilation and oxygenation  Description: INTERVENTIONS:  - Assess for changes in respiratory status  - Assess for changes in mentation and behavior  - Position to facilitate oxygenation and minimize respiratory effort  - Oxygen administered by appropriate delivery if ordered  - Initiate smoking cessation education as indicated  - Encourage broncho-pulmonary hygiene including cough, deep breathe, Incentive Spirometry  - Assess the need for suctioning and aspirate as needed  - Assess and instruct to report SOB or any respiratory difficulty  - Respiratory Therapy support as indicated  Outcome: Progressing

## 2023-07-02 NOTE — ASSESSMENT & PLAN NOTE
Patient noted to have hilar mass in the left hilum  Per radiology CT scan from [facility] has been requested an addendum will be added  Daughter informed of this finding

## 2023-07-02 NOTE — PLAN OF CARE
Problem: MOBILITY - ADULT  Goal: Maintain or return to baseline ADL function  Description: INTERVENTIONS:  -  Assess patient's ability to carry out ADLs; assess patient's baseline for ADL function and identify physical deficits which impact ability to perform ADLs (bathing, care of mouth/teeth, toileting, grooming, dressing, etc.)  - Assess/evaluate cause of self-care deficits   - Assess range of motion  - Assess patient's mobility; develop plan if impaired  - Assess patient's need for assistive devices and provide as appropriate  - Encourage maximum independence but intervene and supervise when necessary  - Involve family in performance of ADLs  - Assess for home care needs following discharge   - Consider OT consult to assist with ADL evaluation and planning for discharge  - Provide patient education as appropriate  Outcome: Progressing  Goal: Maintains/Returns to pre admission functional level  Description: INTERVENTIONS:  - Perform BMAT or MOVE assessment daily.   - Set and communicate daily mobility goal to care team and patient/family/caregiver. - Collaborate with rehabilitation services on mobility goals if consulted  - Perform Range of Motion 3 times a day. - Reposition patient every 2 hours.   - Dangle patient 3 times a day  - Stand patient 3 times a day  - Ambulate patient 3 times a day  - Out of bed to chair 3 times a day   - Out of bed for meals 3 times a day  - Out of bed for toileting  - Record patient progress and toleration of activity level   Outcome: Progressing     Problem: PAIN - ADULT  Goal: Verbalizes/displays adequate comfort level or baseline comfort level  Description: Interventions:  - Encourage patient to monitor pain and request assistance  - Assess pain using appropriate pain scale  - Administer analgesics based on type and severity of pain and evaluate response  - Implement non-pharmacological measures as appropriate and evaluate response  - Consider cultural and social influences on pain and pain management  - Notify physician/advanced practitioner if interventions unsuccessful or patient reports new pain  Outcome: Progressing     Problem: INFECTION - ADULT  Goal: Absence or prevention of progression during hospitalization  Description: INTERVENTIONS:  - Assess and monitor for signs and symptoms of infection  - Monitor lab/diagnostic results  - Monitor all insertion sites, i.e. indwelling lines, tubes, and drains  - Monitor endotracheal if appropriate and nasal secretions for changes in amount and color  - [place] appropriate cooling/warming therapies per order  - Administer medications as ordered  - Instruct and encourage patient and family to use good hand hygiene technique  - Identify and instruct in appropriate isolation precautions for identified infection/condition  Outcome: Progressing     Problem: SAFETY ADULT  Goal: Maintain or return to baseline ADL function  Description: INTERVENTIONS:  -  Assess patient's ability to carry out ADLs; assess patient's baseline for ADL function and identify physical deficits which impact ability to perform ADLs (bathing, care of mouth/teeth, toileting, grooming, dressing, etc.)  - Assess/evaluate cause of self-care deficits   - Assess range of motion  - Assess patient's mobility; develop plan if impaired  - Assess patient's need for assistive devices and provide as appropriate  - Encourage maximum independence but intervene and supervise when necessary  - Involve family in performance of ADLs  - Assess for home care needs following discharge   - Consider OT consult to assist with ADL evaluation and planning for discharge  - Provide patient education as appropriate  Outcome: Progressing  Goal: Maintains/Returns to pre admission functional level  Description: INTERVENTIONS:  - Perform BMAT or MOVE assessment daily.   - Set and communicate daily mobility goal to care team and patient/family/caregiver.    - Collaborate with rehabilitation services on mobility goals if consulted  - Perform Range of Motion 3 times a day. - Reposition patient every 3 hours.   - Dangle patient 3 times a day  - Stand patient 3 times a day  - Ambulate patient 3 times a day  - Out of bed to chair 3 times a day   - Out of bed for meals 3 times a day  - Out of bed for toileting  - Record patient progress and toleration of activity level   Outcome: Progressing  Goal: Patient will remain free of falls  Description: INTERVENTIONS:  - Educate patient/family on patient safety including physical limitations  - Instruct patient to call for assistance with activity   - Consult OT/PT to assist with strengthening/mobility   - Keep Call bell within reach  - Keep bed low and locked with side rails adjusted as appropriate  - Keep care items and personal belongings within reach  - Initiate and maintain comfort rounds  - Make Fall Risk Sign visible to staff  - Offer Toileting every 2 Hours, in advance of need  - Initiate/Maintain bed alarm  - Obtain necessary fall risk management equipment: bed alarm, yellow socks   - Apply yellow socks and bracelet for high fall risk patients  - Consider moving patient to room near nurses station  Outcome: Progressing     Problem: DISCHARGE PLANNING  Goal: Discharge to home or other facility with appropriate resources  Description: INTERVENTIONS:  - Identify barriers to discharge w/patient and caregiver  - Arrange for needed discharge resources and transportation as appropriate  - Identify discharge learning needs (meds, wound care, etc.)  - Arrange for interpretive services to assist at discharge as needed  - Refer to Case Management Department for coordinating discharge planning if the patient needs post-hospital services based on physician/advanced practitioner order or complex needs related to functional status, cognitive ability, or social support system  Outcome: Progressing     Problem: Knowledge Deficit  Goal: Patient/family/caregiver demonstrates understanding of disease process, treatment plan, medications, and discharge instructions  Description: Complete learning assessment and assess knowledge base. Interventions:  - Provide teaching at level of understanding  - Provide teaching via preferred learning methods  Outcome: Progressing     Problem: Prexisting or High Potential for Compromised Skin Integrity  Goal: Skin integrity is maintained or improved  Description: INTERVENTIONS:  - Identify patients at risk for skin breakdown  - Assess and monitor skin integrity  - Assess and monitor nutrition and hydration status  - Monitor labs   - Assess for incontinence   - Turn and reposition patient  - Assist with mobility/ambulation  - Relieve pressure over bony prominences  - Avoid friction and shearing  - Provide appropriate hygiene as needed including keeping skin clean and dry  - Evaluate need for skin moisturizer/barrier cream  - Collaborate with interdisciplinary team   - Patient/family teaching  - Consider wound care consult   Outcome: Progressing     Problem: RESPIRATORY - ADULT  Goal: Achieves optimal ventilation and oxygenation  Description: INTERVENTIONS:  - Assess for changes in respiratory status  - Assess for changes in mentation and behavior  - Position to facilitate oxygenation and minimize respiratory effort  - Oxygen administered by appropriate delivery if ordered  - Initiate smoking cessation education as indicated  - Encourage broncho-pulmonary hygiene including cough, deep breathe, Incentive Spirometry  - Assess the need for suctioning and aspirate as needed  - Assess and instruct to report SOB or any respiratory difficulty  - Respiratory Therapy support as indicated  Outcome: Progressing     Problem: Nutrition/Hydration-ADULT  Goal: Nutrient/Hydration intake appropriate for improving, restoring or maintaining nutritional needs  Description: Monitor and assess patient's nutrition/hydration status for malnutrition.  Collaborate with interdisciplinary team and initiate plan and interventions as ordered. Monitor patient's weight and dietary intake as ordered or per policy. Utilize nutrition screening tool and intervene as necessary. Determine patient's food preferences and provide high-protein, high-caloric foods as appropriate.      INTERVENTIONS:  - Monitor oral intake, urinary output, labs, and treatment plans  - Assess nutrition and hydration status and recommend course of action  - Evaluate amount of meals eaten  - Assist patient with eating if necessary   - Allow adequate time for meals  - Recommend/ encourage appropriate diets, oral nutritional supplements, and vitamin/mineral supplements  - Order, calculate, and assess calorie counts as needed  - Assess need for intravenous fluids  - Provide nutrition/hydration education as appropriate  - Include patient/family/caregiver in decisions related to nutrition  Outcome: Progressing

## 2023-07-02 NOTE — SPEECH THERAPY NOTE
Speech-Language Pathology Bedside Swallow Evaluation        Patient Name: Antonella Shay    JEGQP'C Date: 7/2/2023     Problem List  Patient Active Problem List   Diagnosis   • Severe sepsis [facility])   • Pneumonia   • Acute respiratory failure with hypoxia ([address])   • Acute cystitis without hematuria   • Encephalopathy   • Severe dementia ([address])   • Essential hypertension   • Hilar mass   • Goals of care, counseling/discussion   • Conjunctivitis       Past Medical History  History reviewed. No pertinent past medical history. Past Surgical History  History reviewed. No pertinent surgical history. Current Medical Status  Pt is a 81 y.o. male who presented to [address] [place] [place] [address] with with episode of unresponsiveness. In the ED he was tachycardic, febrile and hypoxic. He was noted to have wet respirations and CT revealed some concern for aspiration. Per chart and RN patient more alert today. Patients daughter Garfield Sarkar present upon my arrival she reports he is near baseline at this time but with increased tremors. She does admit to bringing him smoothies. Baseline diet is puree and nectar thick liquids- she reports she was not aware that smoothies were not allowed on his diet. Daughter reports no prior PNA. She reports he did lose weight but it has been stabilized on current diet. Past medical history:   Please see H&P for details    Special Studies:  6/30 CT Chest/ab/pelvis: Asymmetric pulmonary edema on the left, with left basilar consolidation likely aspiration pneumonitis or pneumonia. Fullness in the left hilum with narrowing of the proximal lingular and left lower lobe airways raising concern for a hilar mass. Patient had a chest CT at [facility] dated 1/21/2018 which has been requested for comparison, and a addendum will be dictated to this report when those images arrive. Coronary artery calcifications which are an indicator of coronary artery disease.  Bladder is collapsed, but there is stranding of the fat surrounding the bladder likely due to cystitis. Fecal impaction. No bowel obstruction    6/30 CT Head wo contrast: No acute intracranial abnormality    6/30 CXR: Development of suspected left basal infiltrate    Swallow Information   Current Risks for Dysphagia & Aspiration: known history of dysphagia, AMS and change in respiratory status     Current Symptoms/Concerns: change in respiratory status    Current Diet: puree/level 1 diet and honey thick liquids      Baseline Diet: puree/level 1 diet and nectar thick liquids    Baseline Assessment   Behavior/Cognition: alert    Speech/Language Status: able to follow commands inconsistently and limited verbal output    Patient Positioning: upright in bed    Swallow Mechanism Exam   Facial: symmetrical  Labial: unable to test 2/2 limited command following  Lingual: unable to test 2/2 limited command following  Velum: symmetrical  Mandible: adequate ROM  Dentition: edentulous+ one tooth visualized ( daughter reports he does not have dentures)  Vocal quality:clear/adequate   Volitional Cough: unable to initiate volitional cough   Resp: 4L NC    Consistencies Assessed and Performance   Consistencies Administered: nectar thick and puree  Specific materials administered included: applesauce, nectar thick    Oral Stage:  Oral acceptance and containment were adequate. Bolus formation and transfer were slow but functional with no significant oral residue noted. No overt s/s reduced oral control. Pharyngeal Stage:  Swallowing initiation appeared prompt. Laryngeal rise was palpated and judged to be within functional limits. No coughing, throat clearing, change in vocal quality or respiratory status noted today. Esophageal Concerns: none reported      Patient with some difficulty with self feeding (approximating spoon to mouth). Summary   Pts oropharyngeal swallow function appears consistent with baseline diet.  If there is concern for silent aspiration may consider VBS however this is reported to be first episode of PNA. Suspect poor secretion management during unresponsive episode and daughter had admitted to bringing in thin smoothies for him.      Recommendations: puree/level 1 diet and nectar thick liquids     Recommended Form of Meds: crushed with puree     Aspiration precautions and compensatory swallowing strategies: upright posture, only feed when fully alert, slow rate of feeding, small bites/sips and alternating bites and sips    Results Reviewed with: patient, RN, MD and family     Dysphagia Goals: pt will tolerate puree with nectar thick without s/s of aspiration x3    Plan  Will f/u 1-2x    Arti Fatima M.S., [address] [facility]  Speech Language Pathologist   Available via [address] [place] [address] #[id number]  [place] #[id number]

## 2023-07-02 NOTE — ASSESSMENT & PLAN NOTE
Left-sided pneumonia on imaging  · Received a dose of cefepime and vancomycin in the ER. Continue cefepime. D/C vanco as MRSA screen neg. Check sputum culture if possible.  urine antigens neg  · Continue scheduled nebulizer treatment

## 2023-07-03 LAB
ANION GAP SERPL CALCULATED.3IONS-SCNC: 7 MMOL/L
ATRIAL RATE: 92 BPM
ATRIAL RATE: 96 BPM
BUN SERPL-MCNC: 19 MG/DL (ref 5–25)
CALCIUM SERPL-MCNC: 8.3 MG/DL (ref 8.4–10.2)
CHLORIDE SERPL-SCNC: 106 MMOL/L (ref 96–108)
CO2 SERPL-SCNC: 25 MMOL/L (ref 21–32)
CREAT SERPL-MCNC: 0.64 MG/DL (ref 0.6–1.3)
ERYTHROCYTE [DISTWIDTH] IN BLOOD BY AUTOMATED COUNT: 13.5 % (ref 11.6–15.1)
GFR SERPL CREATININE-BSD FRML MDRD: 91 ML/MIN/1.73SQ M
GLUCOSE SERPL-MCNC: 112 MG/DL (ref 65–140)
HCT VFR BLD AUTO: 33.2 % (ref 36.5–49.3)
HGB BLD-MCNC: 10.9 G/DL (ref 12–17)
MAGNESIUM SERPL-MCNC: 2.4 MG/DL (ref 1.9–2.7)
MCH RBC QN AUTO: 29.5 PG (ref 26.8–34.3)
MCHC RBC AUTO-ENTMCNC: 32.8 G/DL (ref 31.4–37.4)
MCV RBC AUTO: 90 FL (ref 82–98)
P AXIS: 63 DEGREES
PLATELET # BLD AUTO: 177 THOUSANDS/UL (ref 149–390)
PMV BLD AUTO: 10.8 FL (ref 8.9–12.7)
POTASSIUM SERPL-SCNC: 4.4 MMOL/L (ref 3.5–5.3)
PR INTERVAL: 134 MS
QRS AXIS: -49 DEGREES
QRS AXIS: -53 DEGREES
QRSD INTERVAL: 80 MS
QRSD INTERVAL: 82 MS
QT INTERVAL: 304 MS
QT INTERVAL: 350 MS
QTC INTERVAL: 398 MS
QTC INTERVAL: 442 MS
RBC # BLD AUTO: 3.7 MILLION/UL (ref 3.88–5.62)
SODIUM SERPL-SCNC: 138 MMOL/L (ref 135–147)
T WAVE AXIS: 61 DEGREES
T WAVE AXIS: 78 DEGREES
VENTRICULAR RATE: 103 BPM
VENTRICULAR RATE: 96 BPM
WBC # BLD AUTO: 8.8 THOUSAND/UL (ref 4.31–10.16)

## 2023-07-03 PROCEDURE — 94760 N-INVAS EAR/PLS OXIMETRY 1: CPT

## 2023-07-03 PROCEDURE — 83735 ASSAY OF MAGNESIUM: CPT | Performed by: FAMILY MEDICINE

## 2023-07-03 PROCEDURE — 94668 MNPJ CHEST WALL SBSQ: CPT

## 2023-07-03 PROCEDURE — 94640 AIRWAY INHALATION TREATMENT: CPT

## 2023-07-03 PROCEDURE — 93010 ELECTROCARDIOGRAM REPORT: CPT | Performed by: INTERNAL MEDICINE

## 2023-07-03 PROCEDURE — 80048 BASIC METABOLIC PNL TOTAL CA: CPT | Performed by: FAMILY MEDICINE

## 2023-07-03 PROCEDURE — 99232 SBSQ HOSP IP/OBS MODERATE 35: CPT | Performed by: FAMILY MEDICINE

## 2023-07-03 PROCEDURE — 94669 MECHANICAL CHEST WALL OSCILL: CPT

## 2023-07-03 PROCEDURE — 85027 COMPLETE CBC AUTOMATED: CPT | Performed by: FAMILY MEDICINE

## 2023-07-03 RX ORDER — FUROSEMIDE 10 MG/ML
20 INJECTION INTRAMUSCULAR; INTRAVENOUS ONCE
Status: COMPLETED | OUTPATIENT
Start: 2023-07-03 | End: 2023-07-03

## 2023-07-03 RX ADMIN — CIPROFLOXACIN 1 DROP: 3 SOLUTION OPHTHALMIC at 11:58

## 2023-07-03 RX ADMIN — PRAVASTATIN SODIUM 80 MG: 80 TABLET ORAL at 16:45

## 2023-07-03 RX ADMIN — CIPROFLOXACIN 1 DROP: 3 SOLUTION OPHTHALMIC at 08:33

## 2023-07-03 RX ADMIN — LABETALOL HYDROCHLORIDE 100 MG: 100 TABLET, FILM COATED ORAL at 17:03

## 2023-07-03 RX ADMIN — LABETALOL HYDROCHLORIDE 100 MG: 100 TABLET, FILM COATED ORAL at 08:33

## 2023-07-03 RX ADMIN — IPRATROPIUM BROMIDE AND ALBUTEROL SULFATE 3 ML: 2.5; .5 SOLUTION RESPIRATORY (INHALATION) at 07:09

## 2023-07-03 RX ADMIN — CEFEPIME 2000 MG: 2 INJECTION, POWDER, FOR SOLUTION INTRAVENOUS at 00:49

## 2023-07-03 RX ADMIN — DIVALPROEX SODIUM 250 MG: 250 TABLET, DELAYED RELEASE ORAL at 21:45

## 2023-07-03 RX ADMIN — DONEPEZIL HYDROCHLORIDE 5 MG: 5 TABLET ORAL at 21:45

## 2023-07-03 RX ADMIN — HEPARIN SODIUM 5000 UNITS: 5000 INJECTION INTRAVENOUS; SUBCUTANEOUS at 13:01

## 2023-07-03 RX ADMIN — IPRATROPIUM BROMIDE AND ALBUTEROL SULFATE 3 ML: 2.5; .5 SOLUTION RESPIRATORY (INHALATION) at 01:00

## 2023-07-03 RX ADMIN — IPRATROPIUM BROMIDE AND ALBUTEROL SULFATE 3 ML: 2.5; .5 SOLUTION RESPIRATORY (INHALATION) at 20:10

## 2023-07-03 RX ADMIN — IPRATROPIUM BROMIDE AND ALBUTEROL SULFATE 3 ML: 2.5; .5 SOLUTION RESPIRATORY (INHALATION) at 13:55

## 2023-07-03 RX ADMIN — FUROSEMIDE 20 MG: 10 INJECTION, SOLUTION INTRAVENOUS at 16:45

## 2023-07-03 RX ADMIN — CIPROFLOXACIN 1 DROP: 3 SOLUTION OPHTHALMIC at 17:03

## 2023-07-03 RX ADMIN — LEVOTHYROXINE SODIUM 50 MCG: 75 TABLET ORAL at 08:33

## 2023-07-03 RX ADMIN — ASPIRIN 81 MG CHEWABLE TABLET 81 MG: 81 TABLET CHEWABLE at 08:32

## 2023-07-03 RX ADMIN — HEPARIN SODIUM 5000 UNITS: 5000 INJECTION INTRAVENOUS; SUBCUTANEOUS at 05:23

## 2023-07-03 RX ADMIN — CIPROFLOXACIN 1 DROP: 3 SOLUTION OPHTHALMIC at 21:45

## 2023-07-03 RX ADMIN — BISACODYL 10 MG: 10 SUPPOSITORY RECTAL at 08:33

## 2023-07-03 RX ADMIN — CEFEPIME 2000 MG: 2 INJECTION, POWDER, FOR SOLUTION INTRAVENOUS at 12:00

## 2023-07-03 RX ADMIN — HEPARIN SODIUM 5000 UNITS: 5000 INJECTION INTRAVENOUS; SUBCUTANEOUS at 21:45

## 2023-07-03 RX ADMIN — DIVALPROEX SODIUM 250 MG: 250 TABLET, DELAYED RELEASE ORAL at 08:32

## 2023-07-03 NOTE — CASE MANAGEMENT
Case Management Discharge Planning Note    Patient name Brodie Marti  Location [address] [place] [address] 206/ Carolin Torres MRN [id number]  : 1940 Date 7/3/2023       Current Admission Date: 2023  Current Admission Diagnosis:Severe sepsis [facility])   Patient Active Problem List    Diagnosis Date Noted   • Conjunctivitis 2023   • Severe sepsis ([address]) 2023   • Pneumonia 2023   • Acute respiratory failure with hypoxia ([address]) 2023   • Acute cystitis without hematuria 2023   • Encephalopathy 2023   • Severe dementia ([address]) 2023   • Essential hypertension 2023   • Hilar mass 2023   • Goals of care, counseling/discussion 2023      LOS (days): 3  Geometric Mean LOS (GMLOS) (days):   Days to GMLOS:     OBJECTIVE:  Risk of Unplanned Readmission Score: 15.45     Current admission status: Inpatient   Preferred Pharmacy:   PATIENT/FAMILY REPORTS NO PREFERRED PHARMACY  No address on file      Primary Care Provider: Venita Vega DO    Primary Insurance: MEDICARE  Secondary Insurance: [address]    DISCHARGE DETAILS:    Discharge planning discussed with[de-identified] DaughterByron Shutter of Choice: Yes  Comments - Freedom of Choice: SW following to assist with planning. Case discussed with Dr. Louise Johnston earlier today. Based on pt's progress return to facility is pending. SW placed call to pt's daughter to review plan and IMM. Return to [facility] is planned by daughter upon discharge. CM contacted family/caregiver?: Yes    Contacts  Patient Contacts: Prabha Valdivia  Relationship to Patient[de-identified] Family  Contact Method: Phone  Phone Number: [phone]  Reason/Outcome: Discharge Planning    Other Referral/Resources/Interventions Provided:  Interventions: Facility Return, SNF  Referral Comments: Clinical update sent to [facility] to prepare for return. Facility reserved in [place] as requested by daughter.     Treatment Team Recommendation: Facility Return, SNF  Discharge Destination Plan[de-identified] Facility Return, SNF  Transport at Discharge : BLS Ambulance    IMM Given (Date):: 07/03/23  IMM Given to[de-identified] Family (IMM reviewed with pt's daughter over phone. Daughter verbalized understanding. SW offered to place copy in pt's folder in room, daughter agreeable with plan.  Copy also  placed in scan bin for chart.)

## 2023-07-03 NOTE — ASSESSMENT & PLAN NOTE
Per Daughter, patient with severe dementia and is oriented to self only at baseline  Restarted Aricept.   Continue to hold Seroquel for another day

## 2023-07-03 NOTE — ASSESSMENT & PLAN NOTE
Patient noted to have hilar mass in the left hilum  · Per radiologist, imaging from [facility] was reviewed and still concerns for hilar mass.   Per radiologist can obtain CT chest with contrast for further evaluation  · Discussed with daughter again today and at this time we will hold off on any further testing as it will not  as there is no plans to further pursue this even if the mass is concerning

## 2023-07-03 NOTE — ASSESSMENT & PLAN NOTE
Patient noted to be only responsive at nursing home. Patient does have baseline dementia but can usually hold a conversation  · Likely due to infection.   · Mental Status continues to improve and almost close to baseline per daughter  · CT head negative for acute pathology

## 2023-07-03 NOTE — ASSESSMENT & PLAN NOTE
Patient was having thick yellowish drainage from eyes. Per daughter, pt has had this in the past and was treated with eyedrops with resolution  Started on Cipro drops.

## 2023-07-03 NOTE — ASSESSMENT & PLAN NOTE
O2 sat of 80% on room air prior ER documentation  Previously requiring 6 L of oxygen but currently on room air  Due to pneumonia +/- pulm edema  Noted to have some crackles on exam.  Will give 20 mg of IV Lasix x1 and monitor

## 2023-07-03 NOTE — PROGRESS NOTES
[address]  Progress Note  Name: Kim Galicia  MRN: [id number]  Unit/Bed#: 2 [address] Date of Admission: 6/30/2023   Date of Service: 7/3/2023 I Hospital Day: 3    Assessment/Plan   * Severe sepsis [facility])  Assessment & Plan  As noted by fever, tachypnea, leukocytosis, lactic acidosis  · Most likely due to left-sided pneumonia, UTI  · Blood cultures negative  · Lactic acid normalized with fluid boluses  · Now off IVF as patient now also taking po intake. Acute respiratory failure with hypoxia (HCC)  Assessment & Plan  O2 sat of 80% on room air prior ER documentation  Previously requiring 6 L of oxygen but currently on room air  Due to pneumonia +/- pulm edema  Noted to have some crackles on exam.  Will give 20 mg of IV Lasix x1 and monitor        Pneumonia  Assessment & Plan  Left-sided pneumonia on imaging  · Received a dose of cefepime and vancomycin in the ER. Continue cefepime. · D/C'ed vanco as MRSA screen neg. Check sputum culture if possible. · Urine antigens neg  · Continue scheduled nebulizer treatment    Goals of care, counseling/discussion  Assessment & Plan  Patient with severe dementia and per daughter has been rapidly declining over the last 3 to 4 months  · D/W patient's daughter at bedside that his overall prognosis at this time is guarded. Per discussion with daughter, no aggressive treatments planned. Continue with antibiotics to see if patient improves but if patient were to decline, daughter wants to be contacted and she will most likely transition him to comfort care/hospice at that time. Encephalopathy  Assessment & Plan  Patient noted to be only responsive at nursing home. Patient does have baseline dementia but can usually hold a conversation  · Likely due to infection.   · Mental Status continues to improve and almost close to baseline per daughter  · CT head negative for acute pathology    Acute cystitis without hematuria  Assessment & Plan  Based on UA. As noted above on cefepime  urine culture growing Proteus. Conjunctivitis  Assessment & Plan  Patient was having thick yellowish drainage from eyes. Per daughter, pt has had this in the past and was treated with eyedrops with resolution  Started on Cipro drops. Hilar mass  Assessment & Plan  Patient noted to have hilar mass in the left hilum  · Per radiologist, imaging from [facility] was reviewed and still concerns for hilar mass. Per radiologist can obtain CT chest with contrast for further evaluation  · Discussed with daughter again today and at this time we will hold off on any further testing as it will not  as there is no plans to further pursue this even if the mass is concerning    Essential hypertension  Assessment & Plan  Continue labetalol. BPs stable. Patient did become hypotensive in the ER  Monitor blood pressures    Severe dementia [facility])  Assessment & Plan  Per Daughter, patient with severe dementia and is oriented to self only at baseline  Restarted Aricept. Continue to hold Seroquel for another day         VTE Pharmacologic Prophylaxis: VTE Score: 3 Moderate Risk (Score 3-4) - Pharmacological DVT Prophylaxis Ordered: heparin. Patient Centered Rounds: I performed bedside rounds with nursing staff today. Discussions with Specialists or Other Care Team Provider: yes - radiologist    Education and Discussions with Family / Patient: Updated  (daughter) via phone. Total Time Spent on Date of Encounter in care of patient: 40 min This time was spent on one or more of the following: performing physical exam; counseling and coordination of care; obtaining or reviewing history; documenting in the medical record; reviewing/ordering tests, medications or procedures; communicating with other healthcare professionals and discussing with patient's family/caregivers. Current Length of Stay: 3 day(s)  Current Patient Status: Inpatient   Certification Statement:  The patient will continue to require additional inpatient hospital stay due to pneumonia, UTI, encephalopathy  Discharge Plan: Anticipate discharge in 24-48 hrs to rehab facility. Code Status: Level 3 - DNAR and DNI    Subjective:     Patient appears sleepy today when seen by me. Does open his eyes briefly. Was awake earlier and ate all his breakfast per RN    Objective:     Vitals:   Temp (24hrs), Av.8 °F (37.1 °C), Min:98.5 °F (36.9 °C), Max:99.7 °F (37.6 °C)    Temp:  [98.5 °F (36.9 °C)-99.7 °F (37.6 °C)] 99.7 °F (37.6 °C)  HR:  [] 75  Resp:  [18-20] 18  BP: (117-197)/() 136/74  SpO2:  [94 %-99 %] 94 %  Body mass index is 22.06 kg/m². Input and Output Summary (last 24 hours): Intake/Output Summary (Last 24 hours) at 7/3/2023 1339  Last data filed at 2023 1601  Gross per 24 hour   Intake 120 ml   Output 500 ml   Net -380 ml       Physical Exam:   Physical Exam  Vitals reviewed. Constitutional:       General: He is not in acute distress. Appearance: He is ill-appearing (chronically). He is not toxic-appearing. Comments: Sleeping, opens eyes briefly   HENT:      Head: Normocephalic and atraumatic. Cardiovascular:      Rate and Rhythm: Normal rate and regular rhythm. Pulmonary:      Effort: Pulmonary effort is normal. No respiratory distress. Breath sounds: Rales present. No wheezing. Abdominal:      General: Bowel sounds are normal. There is no distension. Palpations: Abdomen is soft. Tenderness: There is no abdominal tenderness. Additional Data:     Labs:  Results from last 7 days   Lab Units 23  0652 23  0615 23  1230   WBC Thousand/uL 8.80   < > 12.97*   HEMOGLOBIN g/dL 10.9*   < > 14.7   HEMATOCRIT % 33.2*   < > 44.3   PLATELETS Thousands/uL 177   < > 236   BANDS PCT %  --   --  14*   LYMPHO PCT %  --   --  3*   MONO PCT %  --   --  1*   EOS PCT %  --   --  0    < > = values in this interval not displayed.      Results from last 7 days   Lab Units 07/03/23  0522 07/02/23  0459 07/01/23  0615   SODIUM mmol/L 138   < > 141   POTASSIUM mmol/L 4.4   < > 3.9   CHLORIDE mmol/L 106   < > 111*   CO2 mmol/L 25   < > 25   BUN mg/dL 19   < > 20   CREATININE mg/dL 0.64   < > 0.69   ANION GAP mmol/L 7   < > 5   CALCIUM mg/dL 8.3*   < > 9.0   ALBUMIN g/dL  --   --  2.9*   TOTAL BILIRUBIN mg/dL  --   --  0.59   ALK PHOS U/L  --   --  36   ALT U/L  --   --  6*   AST U/L  --   --  14   GLUCOSE RANDOM mg/dL 112   < > 100    < > = values in this interval not displayed. Results from last 7 days   Lab Units 06/30/23  1230   INR  1.18     Results from last 7 days   Lab Units 06/30/23  1209   POC GLUCOSE mg/dl 158*         Results from last 7 days   Lab Units 07/02/23  0459 07/01/23  0615 06/30/23  1721 06/30/23  1413 06/30/23  1230   LACTIC ACID mmol/L  --   --  1.6 2.6* 3.4*   PROCALCITONIN ng/ml 9.89* 17.46*  --   --  0.42*       Lines/Drains:  Invasive Devices     Peripheral Intravenous Line  Duration           Peripheral IV 06/30/23 Distal;Right;Ventral (anterior) Forearm 3 days    Peripheral IV 06/30/23 Right Antecubital 2 days          Drain  Duration           External Urinary Catheter Medium 1 day                      Imaging: Reviewed radiology reports from this admission including: chest CT scan and abdominal/pelvic CT    Recent Cultures (last 7 days):   Results from last 7 days   Lab Units 06/30/23  2045 06/30/23  1322 06/30/23  1316 06/30/23  1234   BLOOD CULTURE   --  No Growth at 48 hrs. --  No Growth at 48 hrs.    URINE CULTURE   --   --  >100,000 cfu/ml Proteus mirabilis*  --    LEGIONELLA URINARY ANTIGEN  Negative  --   --   --        Last 24 Hours Medication List:   Current Facility-Administered Medications   Medication Dose Route Frequency Provider Last Rate   • acetaminophen  650 mg Rectal Q6H PRN Maria Luz Richter DO     • aspirin  81 mg Oral Daily Maria Luz Revankar, DO     • bisacodyl  10 mg Rectal Daily Maria Luz Richter, DO     • cefepime  2 g Intravenous Q12H [facility] Revankar, DO 2,000 mg (07/03/23 1200)   • ciprofloxacin  1 drop Both Eyes 4x Daily [facility] Revankar, DO     • divalproex sodium  250 mg Oral Q12H [address] [facility] Revankar, DO     • donepezil  5 mg Oral HS [facility] Revankar, DO     • heparin (porcine)  5,000 Units Subcutaneous Q8H [address] [facility] Revankar, DO     • ipratropium-albuterol  3 mL Nebulization Q6H [facility] Revankar, DO     • labetalol  100 mg Oral BID [facility] Revankar, DO     • levothyroxine  50 mcg Oral Daily [facility] Revankar, DO     • pravastatin  80 mg Oral Daily With [place] Revdagobertoar, DO          Today, Patient Was Seen By: Fuad Connell DO    **Please Note: This note may have been constructed using a voice recognition system. **

## 2023-07-03 NOTE — PLAN OF CARE
Problem: MOBILITY - ADULT  Goal: Maintain or return to baseline ADL function  Description: INTERVENTIONS:  -  Assess patient's ability to carry out ADLs; assess patient's baseline for ADL function and identify physical deficits which impact ability to perform ADLs (bathing, care of mouth/teeth, toileting, grooming, dressing, etc.)  - Assess/evaluate cause of self-care deficits   - Assess range of motion  - Assess patient's mobility; develop plan if impaired  - Assess patient's need for assistive devices and provide as appropriate  - Encourage maximum independence but intervene and supervise when necessary  - Involve family in performance of ADLs  - Assess for home care needs following discharge   - Consider OT consult to assist with ADL evaluation and planning for discharge  - Provide patient education as appropriate  Outcome: Progressing  Goal: Maintains/Returns to pre admission functional level  Description: INTERVENTIONS:  - Perform BMAT or MOVE assessment daily.   - Set and communicate daily mobility goal to care team and patient/family/caregiver.    - Collaborate with rehabilitation services on mobility goals if consulted  - Out of bed for toileting  - Record patient progress and toleration of activity level   Outcome: Progressing     Problem: INFECTION - ADULT  Goal: Absence or prevention of progression during hospitalization  Description: INTERVENTIONS:  - Assess and monitor for signs and symptoms of infection  - Monitor lab/diagnostic results  - Monitor all insertion sites, i.e. indwelling lines, tubes, and drains  - Monitor endotracheal if appropriate and nasal secretions for changes in amount and color  - Stuart appropriate cooling/warming therapies per order  - Administer medications as ordered  - Instruct and encourage patient and family to use good hand hygiene technique  - Identify and instruct in appropriate isolation precautions for identified infection/condition  Outcome: Progressing Problem: SAFETY ADULT  Goal: Maintain or return to baseline ADL function  Description: INTERVENTIONS:  -  Assess patient's ability to carry out ADLs; assess patient's baseline for ADL function and identify physical deficits which impact ability to perform ADLs (bathing, care of mouth/teeth, toileting, grooming, dressing, etc.)  - Assess/evaluate cause of self-care deficits   - Assess range of motion  - Assess patient's mobility; develop plan if impaired  - Assess patient's need for assistive devices and provide as appropriate  - Encourage maximum independence but intervene and supervise when necessary  - Involve family in performance of ADLs  - Assess for home care needs following discharge   - Consider OT consult to assist with ADL evaluation and planning for discharge  - Provide patient education as appropriate  Outcome: Progressing  Goal: Maintains/Returns to pre admission functional level  Description: INTERVENTIONS:  - Perform BMAT or MOVE assessment daily.   - Set and communicate daily mobility goal to care team and patient/family/caregiver.    - Collaborate with rehabilitation services on mobility goals if consulted  - Out of bed for toileting  - Record patient progress and toleration of activity level   Outcome: Progressing     Problem: RESPIRATORY - ADULT  Goal: Achieves optimal ventilation and oxygenation  Description: INTERVENTIONS:  - Assess for changes in respiratory status  - Assess for changes in mentation and behavior  - Position to facilitate oxygenation and minimize respiratory effort  - Oxygen administered by appropriate delivery if ordered  - Initiate smoking cessation education as indicated  - Encourage broncho-pulmonary hygiene including cough, deep breathe, Incentive Spirometry  - Assess the need for suctioning and aspirate as needed  - Assess and instruct to report SOB or any respiratory difficulty  - Respiratory Therapy support as indicated  Outcome: Progressing     Problem: Nutrition/Hydration-ADULT  Goal: Nutrient/Hydration intake appropriate for improving, restoring or maintaining nutritional needs  Description: Monitor and assess patient's nutrition/hydration status for malnutrition. Collaborate with interdisciplinary team and initiate plan and interventions as ordered. Monitor patient's weight and dietary intake as ordered or per policy. Utilize nutrition screening tool and intervene as necessary. Determine patient's food preferences and provide high-protein, high-caloric foods as appropriate.      INTERVENTIONS:  - Monitor oral intake, urinary output, labs, and treatment plans  - Assess nutrition and hydration status and recommend course of action  - Evaluate amount of meals eaten  - Assist patient with eating if necessary   - Allow adequate time for meals  - Recommend/ encourage appropriate diets, oral nutritional supplements, and vitamin/mineral supplements  - Order, calculate, and assess calorie counts as needed  - Assess need for intravenous fluids  - Provide nutrition/hydration education as appropriate  - Include patient/family/caregiver in decisions related to nutrition  Outcome: Progressing

## 2023-07-03 NOTE — PROGRESS NOTES
-- Patient:  -- MRN: [id number]  -- Aidin Request ID: [id number]  -- Level of care reserved: [address]  -- Partner Reserved: [facility], [address] [place] [address] [phone]  -- Clinical needs requested:  -- Geography searched: 10 miles around [facility]  -- Start of Service:  -- Request sent: 4:36pm EDT on 6/30/2023 by Gemini Willis  -- Partner reserved: 3:38pm EDT on 7/3/2023 by Gemini Willis  -- Choice list shared: 11:21am EDT on 7/3/2023 by Gemini Willis

## 2023-07-03 NOTE — PLAN OF CARE
Problem: MOBILITY - ADULT  Goal: Maintain or return to baseline ADL function  Description: INTERVENTIONS:  -  Assess patient's ability to carry out ADLs; assess patient's baseline for ADL function and identify physical deficits which impact ability to perform ADLs (bathing, care of mouth/teeth, toileting, grooming, dressing, etc.)  - Assess/evaluate cause of self-care deficits   - Assess range of motion  - Assess patient's mobility; develop plan if impaired  - Assess patient's need for assistive devices and provide as appropriate  - Encourage maximum independence but intervene and supervise when necessary  - Involve family in performance of ADLs  - Assess for home care needs following discharge   - Consider OT consult to assist with ADL evaluation and planning for discharge  - Provide patient education as appropriate  7/3/2023 0720 by King Chiquita RN  Outcome: Progressing  7/3/2023 0720 by King Chiquita RN  Outcome: Progressing  Goal: Maintains/Returns to pre admission functional level  Description: INTERVENTIONS:  - Perform BMAT or MOVE assessment daily.   - Set and communicate daily mobility goal to care team and patient/family/caregiver. - Collaborate with rehabilitation services on mobility goals if consulted  - Perform Range of Motion 3 times a day. - Reposition patient every 2 hours.   - Dangle patient 3 times a day  - Stand patient 3 times a day  - Ambulate patient 3 times a day  - Out of bed to chair 3 times a day   - Out of bed for meals 3 times a day  - Out of bed for toileting  - Record patient progress and toleration of activity level   7/3/2023 0720 by King Chiquita RN  Outcome: Progressing  7/3/2023 0720 by King Chiquita RN  Outcome: Progressing     Problem: PAIN - ADULT  Goal: Verbalizes/displays adequate comfort level or baseline comfort level  Description: Interventions:  - Encourage patient to monitor pain and request assistance  - Assess pain using appropriate pain scale  - Administer analgesics based on type and severity of pain and evaluate response  - Implement non-pharmacological measures as appropriate and evaluate response  - Consider cultural and social influences on pain and pain management  - Notify physician/advanced practitioner if interventions unsuccessful or patient reports new pain  7/3/2023 0720 by Paz Constantino RN  Outcome: Progressing  7/3/2023 0720 by Paz Constantino RN  Outcome: Progressing     Problem: INFECTION - ADULT  Goal: Absence or prevention of progression during hospitalization  Description: INTERVENTIONS:  - Assess and monitor for signs and symptoms of infection  - Monitor lab/diagnostic results  - Monitor all insertion sites, i.e. indwelling lines, tubes, and drains  - Monitor endotracheal if appropriate and nasal secretions for changes in amount and color  - [place] appropriate cooling/warming therapies per order  - Administer medications as ordered  - Instruct and encourage patient and family to use good hand hygiene technique  - Identify and instruct in appropriate isolation precautions for identified infection/condition  7/3/2023 0720 by Paz Constantino RN  Outcome: Progressing  7/3/2023 0720 by Paz Constantino RN  Outcome: Progressing     Problem: SAFETY ADULT  Goal: Maintain or return to baseline ADL function  Description: INTERVENTIONS:  -  Assess patient's ability to carry out ADLs; assess patient's baseline for ADL function and identify physical deficits which impact ability to perform ADLs (bathing, care of mouth/teeth, toileting, grooming, dressing, etc.)  - Assess/evaluate cause of self-care deficits   - Assess range of motion  - Assess patient's mobility; develop plan if impaired  - Assess patient's need for assistive devices and provide as appropriate  - Encourage maximum independence but intervene and supervise when necessary  - Involve family in performance of ADLs  - Assess for home care needs following discharge   - Consider OT consult to assist with ADL evaluation and planning for discharge  - Provide patient education as appropriate  7/3/2023 0720 by Bijan Sparks RN  Outcome: Progressing  7/3/2023 0720 by Bijan Sparks RN  Outcome: Progressing  Goal: Maintains/Returns to pre admission functional level  Description: INTERVENTIONS:  - Perform BMAT or MOVE assessment daily.   - Set and communicate daily mobility goal to care team and patient/family/caregiver. - Collaborate with rehabilitation services on mobility goals if consulted  - Perform Range of Motion 3 times a day. - Reposition patient every 2 hours.   - Dangle patient 3 times a day  - Stand patient 3 times a day  - Ambulate patient 3 times a day  - Out of bed to chair 3 times a day   - Out of bed for meals 3 times a day  - Out of bed for toileting  - Record patient progress and toleration of activity level   7/3/2023 0720 by Bijan Sparks RN  Outcome: Progressing  7/3/2023 0720 by Bijan Sparks RN  Outcome: Progressing  Goal: Patient will remain free of falls  Description: INTERVENTIONS:  - Educate patient/family on patient safety including physical limitations  - Instruct patient to call for assistance with activity   - Consult OT/PT to assist with strengthening/mobility   - Keep Call bell within reach  - Keep bed low and locked with side rails adjusted as appropriate  - Keep care items and personal belongings within reach  - Initiate and maintain comfort rounds  - Make Fall Risk Sign visible to staff  - Offer Toileting every 2 Hours, in advance of need  - Initiate/Maintain alarm  - Obtain necessary fall risk management equipment  - Apply yellow socks and bracelet for high fall risk patients  - Consider moving patient to room near nurses station  7/3/2023 0720 by iBjan Sparks RN  Outcome: Progressing  7/3/2023 0720 by Bijan Sparks RN  Outcome: Progressing     Problem: DISCHARGE PLANNING  Goal: Discharge to home or other facility with appropriate resources  Description: INTERVENTIONS:  - Identify barriers to discharge w/patient and caregiver  - Arrange for needed discharge resources and transportation as appropriate  - Identify discharge learning needs (meds, wound care, etc.)  - Arrange for interpretive services to assist at discharge as needed  - Refer to Case Management Department for coordinating discharge planning if the patient needs post-hospital services based on physician/advanced practitioner order or complex needs related to functional status, cognitive ability, or social support system  7/3/2023 0720 by Alem Tomas RN  Outcome: Progressing  7/3/2023 0720 by Alem Tomas RN  Outcome: Progressing     Problem: Knowledge Deficit  Goal: Patient/family/caregiver demonstrates understanding of disease process, treatment plan, medications, and discharge instructions  Description: Complete learning assessment and assess knowledge base.   Interventions:  - Provide teaching at level of understanding  - Provide teaching via preferred learning methods  7/3/2023 0720 by Alem Tomas RN  Outcome: Progressing  7/3/2023 0720 by Alem Tomas RN  Outcome: Progressing     Problem: Prexisting or High Potential for Compromised Skin Integrity  Goal: Skin integrity is maintained or improved  Description: INTERVENTIONS:  - Identify patients at risk for skin breakdown  - Assess and monitor skin integrity  - Assess and monitor nutrition and hydration status  - Monitor labs   - Assess for incontinence   - Turn and reposition patient  - Assist with mobility/ambulation  - Relieve pressure over bony prominences  - Avoid friction and shearing  - Provide appropriate hygiene as needed including keeping skin clean and dry  - Evaluate need for skin moisturizer/barrier cream  - Collaborate with interdisciplinary team   - Patient/family teaching  - Consider wound care consult   7/3/2023 0720 by Alem Tomas RN  Outcome: Progressing  7/3/2023 0720 by Alem Tomas RN  Outcome: Progressing     Problem: RESPIRATORY - ADULT  Goal: Achieves optimal ventilation and oxygenation  Description: INTERVENTIONS:  - Assess for changes in respiratory status  - Assess for changes in mentation and behavior  - Position to facilitate oxygenation and minimize respiratory effort  - Oxygen administered by appropriate delivery if ordered  - Initiate smoking cessation education as indicated  - Encourage broncho-pulmonary hygiene including cough, deep breathe, Incentive Spirometry  - Assess the need for suctioning and aspirate as needed  - Assess and instruct to report SOB or any respiratory difficulty  - Respiratory Therapy support as indicated  7/3/2023 0720 by Sean Morgan RN  Outcome: Progressing  7/3/2023 0720 by Sean Morgan RN  Outcome: Progressing     Problem: Nutrition/Hydration-ADULT  Goal: Nutrient/Hydration intake appropriate for improving, restoring or maintaining nutritional needs  Description: Monitor and assess patient's nutrition/hydration status for malnutrition. Collaborate with interdisciplinary team and initiate plan and interventions as ordered. Monitor patient's weight and dietary intake as ordered or per policy. Utilize nutrition screening tool and intervene as necessary. Determine patient's food preferences and provide high-protein, high-caloric foods as appropriate.      INTERVENTIONS:  - Monitor oral intake, urinary output, labs, and treatment plans  - Assess nutrition and hydration status and recommend course of action  - Evaluate amount of meals eaten  - Assist patient with eating if necessary   - Allow adequate time for meals  - Recommend/ encourage appropriate diets, oral nutritional supplements, and vitamin/mineral supplements  - Order, calculate, and assess calorie counts as needed  - Assess need for intravenous fluids  - Provide nutrition/hydration education as appropriate  - Include patient/family/caregiver in decisions related to nutrition  7/3/2023 0720 by Sean Morgan RN  Outcome: Progressing  7/3/2023 0720 by Paz Constantino, RN  Outcome: Progressing

## 2023-07-03 NOTE — ASSESSMENT & PLAN NOTE
As noted by fever, tachypnea, leukocytosis, lactic acidosis  · Most likely due to left-sided pneumonia, UTI  · Blood cultures negative  · Lactic acid normalized with fluid boluses  · Now off IVF as patient now also taking po intake.

## 2023-07-03 NOTE — ASSESSMENT & PLAN NOTE
Left-sided pneumonia on imaging  · Received a dose of cefepime and vancomycin in the ER. Continue cefepime. · D/C'ed vanco as MRSA screen neg. Check sputum culture if possible.    · Urine antigens neg  · Continue scheduled nebulizer treatment

## 2023-07-04 VITALS
BODY MASS INDEX: 22.11 KG/M2 | HEART RATE: 63 BPM | TEMPERATURE: 97.8 F | RESPIRATION RATE: 18 BRPM | OXYGEN SATURATION: 93 % | WEIGHT: 140.87 LBS | SYSTOLIC BLOOD PRESSURE: 122 MMHG | HEIGHT: 67 IN | DIASTOLIC BLOOD PRESSURE: 77 MMHG

## 2023-07-04 PROCEDURE — 94640 AIRWAY INHALATION TREATMENT: CPT

## 2023-07-04 PROCEDURE — 99239 HOSP IP/OBS DSCHRG MGMT >30: CPT

## 2023-07-04 PROCEDURE — 94760 N-INVAS EAR/PLS OXIMETRY 1: CPT

## 2023-07-04 PROCEDURE — 94669 MECHANICAL CHEST WALL OSCILL: CPT

## 2023-07-04 RX ORDER — CIPROFLOXACIN HYDROCHLORIDE 3.5 MG/ML
1 SOLUTION/ DROPS TOPICAL 4 TIMES DAILY
Qty: 2.5 ML | Refills: 0
Start: 2023-07-04 | End: 2023-07-07

## 2023-07-04 RX ORDER — CEFDINIR 300 MG/1
300 CAPSULE ORAL EVERY 12 HOURS SCHEDULED
Qty: 4 CAPSULE | Refills: 0
Start: 2023-07-05 | End: 2023-07-07

## 2023-07-04 RX ADMIN — HEPARIN SODIUM 5000 UNITS: 5000 INJECTION INTRAVENOUS; SUBCUTANEOUS at 05:17

## 2023-07-04 RX ADMIN — DIVALPROEX SODIUM 250 MG: 250 TABLET, DELAYED RELEASE ORAL at 08:56

## 2023-07-04 RX ADMIN — IPRATROPIUM BROMIDE AND ALBUTEROL SULFATE 3 ML: 2.5; .5 SOLUTION RESPIRATORY (INHALATION) at 01:00

## 2023-07-04 RX ADMIN — CIPROFLOXACIN 1 DROP: 3 SOLUTION OPHTHALMIC at 08:56

## 2023-07-04 RX ADMIN — ASPIRIN 81 MG CHEWABLE TABLET 81 MG: 81 TABLET CHEWABLE at 08:56

## 2023-07-04 RX ADMIN — BISACODYL 10 MG: 10 SUPPOSITORY RECTAL at 08:56

## 2023-07-04 RX ADMIN — CEFEPIME 2000 MG: 2 INJECTION, POWDER, FOR SOLUTION INTRAVENOUS at 00:19

## 2023-07-04 RX ADMIN — LABETALOL HYDROCHLORIDE 100 MG: 100 TABLET, FILM COATED ORAL at 08:56

## 2023-07-04 RX ADMIN — LEVOTHYROXINE SODIUM 50 MCG: 75 TABLET ORAL at 05:16

## 2023-07-04 RX ADMIN — IPRATROPIUM BROMIDE AND ALBUTEROL SULFATE 3 ML: 2.5; .5 SOLUTION RESPIRATORY (INHALATION) at 08:34

## 2023-07-04 NOTE — PLAN OF CARE
Problem: MOBILITY - ADULT  Goal: Maintain or return to baseline ADL function  Description: INTERVENTIONS:  -  Assess patient's ability to carry out ADLs; assess patient's baseline for ADL function and identify physical deficits which impact ability to perform ADLs (bathing, care of mouth/teeth, toileting, grooming, dressing, etc.)  - Assess/evaluate cause of self-care deficits   - Assess range of motion  - Assess patient's mobility; develop plan if impaired  - Assess patient's need for assistive devices and provide as appropriate  - Encourage maximum independence but intervene and supervise when necessary  - Involve family in performance of ADLs  - Assess for home care needs following discharge   - Consider OT consult to assist with ADL evaluation and planning for discharge  - Provide patient education as appropriate  7/4/2023 1303 by Elysia Reyna RN  Outcome: Completed  7/4/2023 0753 by Elysia Reyna RN  Outcome: Progressing  Goal: Maintains/Returns to pre admission functional level  Description: INTERVENTIONS:  - Perform BMAT or MOVE assessment daily.   - Set and communicate daily mobility goal to care team and patient/family/caregiver. - Collaborate with rehabilitation services on mobility goals if consulted  - Perform Range of Motion 3 times a day. - Reposition patient every 2 hours.   - Dangle patient 3 times a day  - Stand patient 3 times a day  - Ambulate patient 3 times a day  - Out of bed to chair 3 times a day   - Out of bed for meals 3 times a day  - Out of bed for toileting  - Record patient progress and toleration of activity level   7/4/2023 1303 by Elysia Reyna RN  Outcome: Completed  7/4/2023 0753 by Elysia Reyna RN  Outcome: Progressing     Problem: PAIN - ADULT  Goal: Verbalizes/displays adequate comfort level or baseline comfort level  Description: Interventions:  - Encourage patient to monitor pain and request assistance  - Assess pain using appropriate pain scale  - Administer analgesics based on type and severity of pain and evaluate response  - Implement non-pharmacological measures as appropriate and evaluate response  - Consider cultural and social influences on pain and pain management  - Notify physician/advanced practitioner if interventions unsuccessful or patient reports new pain  7/4/2023 1303 by Pio Redman RN  Outcome: Completed  7/4/2023 0753 by Pio Redman RN  Outcome: Progressing     Problem: INFECTION - ADULT  Goal: Absence or prevention of progression during hospitalization  Description: INTERVENTIONS:  - Assess and monitor for signs and symptoms of infection  - Monitor lab/diagnostic results  - Monitor all insertion sites, i.e. indwelling lines, tubes, and drains  - Monitor endotracheal if appropriate and nasal secretions for changes in amount and color  - [place] appropriate cooling/warming therapies per order  - Administer medications as ordered  - Instruct and encourage patient and family to use good hand hygiene technique  - Identify and instruct in appropriate isolation precautions for identified infection/condition  7/4/2023 1303 by Pio Redman RN  Outcome: Completed  7/4/2023 0753 by Pio Redman RN  Outcome: Progressing     Problem: SAFETY ADULT  Goal: Maintain or return to baseline ADL function  Description: INTERVENTIONS:  -  Assess patient's ability to carry out ADLs; assess patient's baseline for ADL function and identify physical deficits which impact ability to perform ADLs (bathing, care of mouth/teeth, toileting, grooming, dressing, etc.)  - Assess/evaluate cause of self-care deficits   - Assess range of motion  - Assess patient's mobility; develop plan if impaired  - Assess patient's need for assistive devices and provide as appropriate  - Encourage maximum independence but intervene and supervise when necessary  - Involve family in performance of ADLs  - Assess for home care needs following discharge   - Consider OT consult to assist with ADL evaluation and planning for discharge  - Provide patient education as appropriate  7/4/2023 1303 by Lee Gomez RN  Outcome: Completed  7/4/2023 0753 by Lee Gomez RN  Outcome: Progressing  Goal: Maintains/Returns to pre admission functional level  Description: INTERVENTIONS:  - Perform BMAT or MOVE assessment daily.   - Set and communicate daily mobility goal to care team and patient/family/caregiver. - Collaborate with rehabilitation services on mobility goals if consulted  - Perform Range of Motion 3 times a day. - Reposition patient every 2 hours.   - Dangle patient 3 times a day  - Stand patient 3 times a day  - Ambulate patient 3 times a day  - Out of bed to chair 3 times a day   - Out of bed for meals 3 times a day  - Out of bed for toileting  - Record patient progress and toleration of activity level   7/4/2023 1303 by Lee Gomez RN  Outcome: Completed  7/4/2023 0753 by Lee Gomez RN  Outcome: Progressing  Goal: Patient will remain free of falls  Description: INTERVENTIONS:  - Educate patient/family on patient safety including physical limitations  - Instruct patient to call for assistance with activity   - Consult OT/PT to assist with strengthening/mobility   - Keep Call bell within reach  - Keep bed low and locked with side rails adjusted as appropriate  - Keep care items and personal belongings within reach  - Initiate and maintain comfort rounds  - Make Fall Risk Sign visible to staff  - Offer Toileting every  Hours, in advance of need  - Initiate/Maintain alarm  - Obtain necessary fall risk management equipment:  - Apply yellow socks and bracelet for high fall risk patients  - Consider moving patient to room near nurses station  7/4/2023 1303 by Lee Gomez RN  Outcome: Completed  7/4/2023 0753 by Lee Gomez RN  Outcome: Progressing     Problem: DISCHARGE PLANNING  Goal: Discharge to home or other facility with appropriate resources  Description: INTERVENTIONS:  - Identify barriers to discharge w/patient and caregiver  - Arrange for needed discharge resources and transportation as appropriate  - Identify discharge learning needs (meds, wound care, etc.)  - Arrange for interpretive services to assist at discharge as needed  - Refer to Case Management Department for coordinating discharge planning if the patient needs post-hospital services based on physician/advanced practitioner order or complex needs related to functional status, cognitive ability, or social support system  7/4/2023 1303 by Lata Rosales RN  Outcome: Completed  7/4/2023 0753 by Lata Rosales RN  Outcome: Progressing     Problem: Knowledge Deficit  Goal: Patient/family/caregiver demonstrates understanding of disease process, treatment plan, medications, and discharge instructions  Description: Complete learning assessment and assess knowledge base.   Interventions:  - Provide teaching at level of understanding  - Provide teaching via preferred learning methods  7/4/2023 1303 by Lata Rosales RN  Outcome: Completed  7/4/2023 0753 by Lata Rosales RN  Outcome: Progressing     Problem: Prexisting or High Potential for Compromised Skin Integrity  Goal: Skin integrity is maintained or improved  Description: INTERVENTIONS:  - Identify patients at risk for skin breakdown  - Assess and monitor skin integrity  - Assess and monitor nutrition and hydration status  - Monitor labs   - Assess for incontinence   - Turn and reposition patient  - Assist with mobility/ambulation  - Relieve pressure over bony prominences  - Avoid friction and shearing  - Provide appropriate hygiene as needed including keeping skin clean and dry  - Evaluate need for skin moisturizer/barrier cream  - Collaborate with interdisciplinary team   - Patient/family teaching  - Consider wound care consult   7/4/2023 1303 by Lata Rosales RN  Outcome: Completed  7/4/2023 0753 by Lata Rosales RN  Outcome: Progressing     Problem: RESPIRATORY - ADULT  Goal: Achieves optimal ventilation and oxygenation  Description: INTERVENTIONS:  - Assess for changes in respiratory status  - Assess for changes in mentation and behavior  - Position to facilitate oxygenation and minimize respiratory effort  - Oxygen administered by appropriate delivery if ordered  - Initiate smoking cessation education as indicated  - Encourage broncho-pulmonary hygiene including cough, deep breathe, Incentive Spirometry  - Assess the need for suctioning and aspirate as needed  - Assess and instruct to report SOB or any respiratory difficulty  - Respiratory Therapy support as indicated  7/4/2023 1303 by Sabra Torres RN  Outcome: Completed  7/4/2023 0753 by Sabra Torres RN  Outcome: Progressing     Problem: Nutrition/Hydration-ADULT  Goal: Nutrient/Hydration intake appropriate for improving, restoring or maintaining nutritional needs  Description: Monitor and assess patient's nutrition/hydration status for malnutrition. Collaborate with interdisciplinary team and initiate plan and interventions as ordered. Monitor patient's weight and dietary intake as ordered or per policy. Utilize nutrition screening tool and intervene as necessary. Determine patient's food preferences and provide high-protein, high-caloric foods as appropriate.      INTERVENTIONS:  - Monitor oral intake, urinary output, labs, and treatment plans  - Assess nutrition and hydration status and recommend course of action  - Evaluate amount of meals eaten  - Assist patient with eating if necessary   - Allow adequate time for meals  - Recommend/ encourage appropriate diets, oral nutritional supplements, and vitamin/mineral supplements  - Order, calculate, and assess calorie counts as needed  - Assess need for intravenous fluids  - Provide nutrition/hydration education as appropriate  - Include patient/family/caregiver in decisions related to nutrition  7/4/2023 1303 by Sabra Torres RN  Outcome: Completed  7/4/2023 0753 by Case Gale RUDOLPH Cates  Outcome: Progressing

## 2023-07-04 NOTE — ASSESSMENT & PLAN NOTE
· Per Daughter, patient with severe dementia and is oriented to self only at baseline  · Continue home medications

## 2023-07-04 NOTE — CASE MANAGEMENT
Case Management Discharge Planning Note    Patient name Usman Aldrich  Location [address] 206/2 [address] [address] MRN [id number]  : 1940 Date 2023       Current Admission Date: 2023  Current Admission Diagnosis:Severe sepsis [facility])   Patient Active Problem List    Diagnosis Date Noted   • Conjunctivitis 2023   • Severe sepsis ([address]) 2023   • Pneumonia 2023   • Acute respiratory failure with hypoxia ([address]) 2023   • Acute cystitis without hematuria 2023   • Encephalopathy 2023   • Severe dementia ([address]) 2023   • Essential hypertension 2023   • Hilar mass 2023   • Goals of care, counseling/discussion 2023      LOS (days): 4  Geometric Mean LOS (GMLOS) (days): 5.00  Days to GMLOS:1.1     OBJECTIVE:  Risk of Unplanned Readmission Score: 15.64         Current admission status: Inpatient   Preferred Pharmacy:   PATIENT/FAMILY REPORTS NO PREFERRED PHARMACY  No address on file      Primary Care Provider: Franklyn Thayer DO    Primary Insurance: MEDICARE  Secondary Insurance: [address]    DISCHARGE DETAILS:    Discharge planning discussed with[de-identified] Patient's daughter  Freedom of Choice: Yes  Comments - Freedom of Choice: Choice is to return to [facility]  CM contacted family/caregiver?: Yes  Were Treatment Team discharge recommendations reviewed with patient/caregiver?: Yes  Did patient/caregiver verbalize understanding of patient care needs?: N/A- going to facility  Were patient/caregiver advised of the risks associated with not following Treatment Team discharge recommendations?: Yes    Contacts  Patient Contacts: Roberto Porter  Relationship to Patient[de-identified] Family  Contact Method: Phone  Phone Number: [phone]  Reason/Outcome: Discharge Planning, Emergency [address]         Is the patient interested in [address] [facility] [place] [facility] at discharge?: No    DME Referral Provided  Referral made for DME?: No    Other Referral/Resources/Interventions Provided:  Interventions: SNF, Facility Return  Referral Comments: Ryan Dewitt confirmed patient is able to return today with 1330 transport. Would you like to participate in our [address] service program?  : No - Declined    Treatment Team Recommendation: Facility Return, SNF  Discharge Destination Plan[de-identified] Facility Return, SNF ([address] ([place]))  Transport at Discharge : [facility]  Dispatcher Contacted: Yes  Number/Name of Dispatcher: [facility]  SEKOU  ETA of Transport (Date): 07/04/23  ETA of Transport (Time): [address] Name, [address] [facility] [address] : [address] ([place])  [place], [place]  Receiving Facility/Agency Phone Number: [phone]  Facility/Agency Fax Number: [phone]  [facility] Number: JUSTINE Farrell, JASON, ACJONY, [facility]  07/04/23 11:16 AM

## 2023-07-04 NOTE — ASSESSMENT & PLAN NOTE
As noted by fever, tachypnea, leukocytosis, lactic acidosis  · Most likely due to left-sided pneumonia, UTI  · Blood cultures negative  · Lactic acid normalized with fluid boluses  · Now off IVF as patient now also taking po intake  · Completed 5 days cefepime, will give cefdinir 2 more days to complete 7 days antibiotics

## 2023-07-04 NOTE — ASSESSMENT & PLAN NOTE
· Based on UA  · urine culture growing Proteus  · Completed 5 days cefepime, will give cefdinir for 2 more days to complete 7 days abx

## 2023-07-04 NOTE — PLAN OF CARE
Problem: MOBILITY - ADULT  Goal: Maintain or return to baseline ADL function  Description: INTERVENTIONS:  -  Assess patient's ability to carry out ADLs; assess patient's baseline for ADL function and identify physical deficits which impact ability to perform ADLs (bathing, care of mouth/teeth, toileting, grooming, dressing, etc.)  - Assess/evaluate cause of self-care deficits   - Assess range of motion  - Assess patient's mobility; develop plan if impaired  - Assess patient's need for assistive devices and provide as appropriate  - Encourage maximum independence but intervene and supervise when necessary  - Involve family in performance of ADLs  - Assess for home care needs following discharge   - Consider OT consult to assist with ADL evaluation and planning for discharge  - Provide patient education as appropriate  Outcome: Progressing  Goal: Maintains/Returns to pre admission functional level  Description: INTERVENTIONS:  - Perform BMAT or MOVE assessment daily.   - Set and communicate daily mobility goal to care team and patient/family/caregiver. - Collaborate with rehabilitation services on mobility goals if consulted  - Perform Range of Motion 3 times a day. - Reposition patient every 2 hours.   - Dangle patient 3 times a day  - Stand patient 3 times a day  - Ambulate patient 3 times a day  - Out of bed to chair 3 times a day   - Out of bed for meals 3 times a day  - Out of bed for toileting  - Record patient progress and toleration of activity level   Outcome: Progressing     Problem: PAIN - ADULT  Goal: Verbalizes/displays adequate comfort level or baseline comfort level  Description: Interventions:  - Encourage patient to monitor pain and request assistance  - Assess pain using appropriate pain scale  - Administer analgesics based on type and severity of pain and evaluate response  - Implement non-pharmacological measures as appropriate and evaluate response  - Consider cultural and social influences on pain and pain management  - Notify physician/advanced practitioner if interventions unsuccessful or patient reports new pain  Outcome: Progressing     Problem: SAFETY ADULT  Goal: Maintain or return to baseline ADL function  Description: INTERVENTIONS:  -  Assess patient's ability to carry out ADLs; assess patient's baseline for ADL function and identify physical deficits which impact ability to perform ADLs (bathing, care of mouth/teeth, toileting, grooming, dressing, etc.)  - Assess/evaluate cause of self-care deficits   - Assess range of motion  - Assess patient's mobility; develop plan if impaired  - Assess patient's need for assistive devices and provide as appropriate  - Encourage maximum independence but intervene and supervise when necessary  - Involve family in performance of ADLs  - Assess for home care needs following discharge   - Consider OT consult to assist with ADL evaluation and planning for discharge  - Provide patient education as appropriate  Outcome: Progressing  Goal: Maintains/Returns to pre admission functional level  Description: INTERVENTIONS:  - Perform BMAT or MOVE assessment daily.   - Set and communicate daily mobility goal to care team and patient/family/caregiver. - Collaborate with rehabilitation services on mobility goals if consulted  - Perform Range of Motion 3 times a day. - Reposition patient every 2 hours. - Dangle patient 3 times a day  - Stand patient 3 times a day  - Ambulate patient 3 times a day  - Out of bed to chair 3 times a day   - Out of bed for meals 3 times a day  - Out of bed for toileting  - Record patient progress and toleration of activity level   Outcome: Progressing     Problem: Knowledge Deficit  Goal: Patient/family/caregiver demonstrates understanding of disease process, treatment plan, medications, and discharge instructions  Description: Complete learning assessment and assess knowledge base.   Interventions:  - Provide teaching at level of understanding  - Provide teaching via preferred learning methods  Outcome: Progressing

## 2023-07-04 NOTE — ASSESSMENT & PLAN NOTE
· Continue labetalol. BPs stable.   Patient did become hypotensive in the ER  · Monitor blood pressures  · BP has been stable

## 2023-07-04 NOTE — ASSESSMENT & PLAN NOTE
· O2 sat of 80% on room air prior ER documentation  · Previously requiring 6 L of oxygen but currently on room air  · Due to pneumonia +/- pulm edema  · S/p 1 dose 20 mg IV lasix  · Pt stable on room air

## 2023-07-04 NOTE — ASSESSMENT & PLAN NOTE
Patient noted to be only responsive at nursing home.   Patient does have baseline dementia but can usually hold a conversation  · Likely due to infection  · Mental Status continues to improve and essentially at baseline per nursing staff and daughter  · CT head negative for acute pathology

## 2023-07-04 NOTE — PLAN OF CARE
Problem: MOBILITY - ADULT  Goal: Maintain or return to baseline ADL function  Description: INTERVENTIONS:  -  Assess patient's ability to carry out ADLs; assess patient's baseline for ADL function and identify physical deficits which impact ability to perform ADLs (bathing, care of mouth/teeth, toileting, grooming, dressing, etc.)  - Assess/evaluate cause of self-care deficits   - Assess range of motion  - Assess patient's mobility; develop plan if impaired  - Assess patient's need for assistive devices and provide as appropriate  - Encourage maximum independence but intervene and supervise when necessary  - Involve family in performance of ADLs  - Assess for home care needs following discharge   - Consider OT consult to assist with ADL evaluation and planning for discharge  - Provide patient education as appropriate  Outcome: Progressing  Goal: Maintains/Returns to pre admission functional level  Description: INTERVENTIONS:  - Perform BMAT or MOVE assessment daily.   - Set and communicate daily mobility goal to care team and patient/family/caregiver. - Collaborate with rehabilitation services on mobility goals if consulted  - Perform Range of Motion 3 times a day. - Reposition patient every 2 hours.   - Dangle patient 3 times a day  - Stand patient 3 times a day  - Ambulate patient 3 times a day  - Out of bed to chair 3 times a day   - Out of bed for meals 3 times a day  - Out of bed for toileting  - Record patient progress and toleration of activity level   Outcome: Progressing     Problem: PAIN - ADULT  Goal: Verbalizes/displays adequate comfort level or baseline comfort level  Description: Interventions:  - Encourage patient to monitor pain and request assistance  - Assess pain using appropriate pain scale  - Administer analgesics based on type and severity of pain and evaluate response  - Implement non-pharmacological measures as appropriate and evaluate response  - Consider cultural and social influences on pain and pain management  - Notify physician/advanced practitioner if interventions unsuccessful or patient reports new pain  Outcome: Progressing     Problem: INFECTION - ADULT  Goal: Absence or prevention of progression during hospitalization  Description: INTERVENTIONS:  - Assess and monitor for signs and symptoms of infection  - Monitor lab/diagnostic results  - Monitor all insertion sites, i.e. indwelling lines, tubes, and drains  - Monitor endotracheal if appropriate and nasal secretions for changes in amount and color  - [place] appropriate cooling/warming therapies per order  - Administer medications as ordered  - Instruct and encourage patient and family to use good hand hygiene technique  - Identify and instruct in appropriate isolation precautions for identified infection/condition  Outcome: Progressing     Problem: SAFETY ADULT  Goal: Maintain or return to baseline ADL function  Description: INTERVENTIONS:  -  Assess patient's ability to carry out ADLs; assess patient's baseline for ADL function and identify physical deficits which impact ability to perform ADLs (bathing, care of mouth/teeth, toileting, grooming, dressing, etc.)  - Assess/evaluate cause of self-care deficits   - Assess range of motion  - Assess patient's mobility; develop plan if impaired  - Assess patient's need for assistive devices and provide as appropriate  - Encourage maximum independence but intervene and supervise when necessary  - Involve family in performance of ADLs  - Assess for home care needs following discharge   - Consider OT consult to assist with ADL evaluation and planning for discharge  - Provide patient education as appropriate  Outcome: Progressing  Goal: Maintains/Returns to pre admission functional level  Description: INTERVENTIONS:  - Perform BMAT or MOVE assessment daily.   - Set and communicate daily mobility goal to care team and patient/family/caregiver.    - Collaborate with rehabilitation services on mobility goals if consulted  - Perform Range of Motion 3 times a day. - Reposition patient every 2 hours.   - Dangle patient 3 times a day  - Stand patient 3 times a day  - Ambulate patient 3 times a day  - Out of bed to chair 3 times a day   - Out of bed for meals 3 times a day  - Out of bed for toileting  - Record patient progress and toleration of activity level   Outcome: Progressing  Goal: Patient will remain free of falls  Description: INTERVENTIONS:  - Educate patient/family on patient safety including physical limitations  - Instruct patient to call for assistance with activity   - Consult OT/PT to assist with strengthening/mobility   - Keep Call bell within reach  - Keep bed low and locked with side rails adjusted as appropriate  - Keep care items and personal belongings within reach  - Initiate and maintain comfort rounds  - Make Fall Risk Sign visible to staff  - Offer Toileting every  Hours, in advance of need  - Initiate/Maintain alarm  - Obtain necessary fall risk management equipment:   - Apply yellow socks and bracelet for high fall risk patients  - Consider moving patient to room near nurses station  Outcome: Progressing     Problem: DISCHARGE PLANNING  Goal: Discharge to home or other facility with appropriate resources  Description: INTERVENTIONS:  - Identify barriers to discharge w/patient and caregiver  - Arrange for needed discharge resources and transportation as appropriate  - Identify discharge learning needs (meds, wound care, etc.)  - Arrange for interpretive services to assist at discharge as needed  - Refer to Case Management Department for coordinating discharge planning if the patient needs post-hospital services based on physician/advanced practitioner order or complex needs related to functional status, cognitive ability, or social support system  Outcome: Progressing     Problem: Knowledge Deficit  Goal: Patient/family/caregiver demonstrates understanding of disease process, treatment plan, medications, and discharge instructions  Description: Complete learning assessment and assess knowledge base.   Interventions:  - Provide teaching at level of understanding  - Provide teaching via preferred learning methods  Outcome: Progressing     Problem: Prexisting or High Potential for Compromised Skin Integrity  Goal: Skin integrity is maintained or improved  Description: INTERVENTIONS:  - Identify patients at risk for skin breakdown  - Assess and monitor skin integrity  - Assess and monitor nutrition and hydration status  - Monitor labs   - Assess for incontinence   - Turn and reposition patient  - Assist with mobility/ambulation  - Relieve pressure over bony prominences  - Avoid friction and shearing  - Provide appropriate hygiene as needed including keeping skin clean and dry  - Evaluate need for skin moisturizer/barrier cream  - Collaborate with interdisciplinary team   - Patient/family teaching  - Consider wound care consult   Outcome: Progressing

## 2023-07-04 NOTE — ASSESSMENT & PLAN NOTE
Left-sided pneumonia on imaging  · Received a dose of cefepime and vancomycin in the ER  · Completed 5 days cefepime, will give 2 days cefdinir to complete 7 day course antibiotics  · D/C'ed vanco as MRSA screen neg  · Urine antigens neg  · Continue scheduled nebulizer treatment

## 2023-07-04 NOTE — ASSESSMENT & PLAN NOTE
· Patient was having thick yellowish drainage from eyes.  Per daughter, pt has had this in the past and was treated with eyedrops with resolution  · Started on Cipro drops

## 2023-07-04 NOTE — [place] UNIVERSAL TRANSFER FORM
[place] UNIVERSAL TRANSFER FOR[place]  (ALL ITEMS MUST BE COMPLETED)    1. TRANSFER FROM: [address] [facility] [address]: [place]    2. DATE OF TRANSFER: 7/4/2023                        TIME OF TRANSFER: 1300    3. PATIENT NAME: Konnie Soulier,        YOB: 1940                             GENDER: male    4. LANGUAGE:   English    5. PHYSICIAN NAME:  Karin Johnson MD                   PHONE: [phone]    6. CODE STATUS: Level 3 - DNAR and DNI        Out of Hospital DNR Attached: Yes    7. :                                      :  Extended Emergency Contact Information  Primary Emergency Contact: Mari Mcgill  Address: [address] [place] [address] Dr JESSIE Marion, [address] Phone: [phone]  Mobile Phone: [phone]  Relation: Daughter           Health Care Representative/Proxy:  No           Legal Guardian:  No             NAME OF:           HEALTH CARE REPRESENTATIVE/PROXY:                                         OR           LEGAL GUARDIAN, IF NOT :                                               PHONE:  (Day)           (Night)                        (Cell)    8. REASON FOR TRANSFER: (Must include brief medical history and recent changes in physical function or cognition.) Long term care            V/S: /77 (BP Location: Left arm)   Pulse 63   Temp 97.8 °F (36.6 °C) (Axillary)   Resp 18   Ht 5' 7" (1.702 m)   Wt 63.9 kg (140 lb 14 oz)   SpO2 93%   BMI 22.06 kg/m²           PAIN: None    9. PRIMARY DIAGNOSIS: Severe sepsis ([address])      Secondary Diagnosis:         Pacemaker: No      Internal Defib: No          Mental Health Diagnosis (if Applicable):    10. RESTRAINTS: No     11. RESPIRATORY NEEDS: None    12. ISOLATION/PRECAUTION: None    13. ALLERGY: Shellfish-derived products - food allergy    14. SENSORY:       Vision Poor, Hearing Poor and Speech Difficult    15.  SKIN CONDITION: No Wounds    16. DIET: Special (describe)Dysphagia Nectar thick    17. IV ACCESS: None    18. PERSONAL ITEMS SENT WITH PATIENT: None    19. ATTACHED DOCUMENTS: MUST ATTACH CURRENT MEDICATION INFORMATION Face Sheet, MAR, Medication Reconciliation and TAR    20. AT RISK ALERTS:Pressure Ulcer        HARM TO: N/A    21. WEIGHT BEARING STATUS:         Left Leg: None        Right Leg: None    22. MENTAL STATUS:Othernon-verbal--Unable to assess    23. FUNCTION:        Walk: Not Able        Transfer: Not Able        Toilet: Not Able        Feed: Not Able    24. IMMUNIZATIONS/SCREENING:     Immunization History   Administered Date(s) Administered   • Tdap 04/19/2023       25. BOWEL: Incontinent  and Date Last BM7/4/23    26. BLADDER: Incontinent    27.  SENDING FACILITY CONTACT: Aneta Walden RN                  Title: RN        Unit: [address]        Phone: [phone] [address] (if known):        Title:        Unit:         Phone:         FORM PREFILLED BY (if applicable)       Title:       Unit:        Phone:         FORM COMPLETED BY Frank Banks RN      Title: [place]      Phone: [phone]

## 2023-07-04 NOTE — DISCHARGE SUMMARY
[id number] [facility]  Discharge- Sonia Senegal 1940, 81 y.o. male MRN: [id number]  Unit/Bed#: [address] [place] [address] Encounter: [id number]  Primary Care Provider: Pam Walls DO   Date and time admitted to hospital: 6/30/2023 11:55 AM    * Severe sepsis [facility])  Assessment & Plan  As noted by fever, tachypnea, leukocytosis, lactic acidosis  · Most likely due to left-sided pneumonia, UTI  · Blood cultures negative  · Lactic acid normalized with fluid boluses  · Now off IVF as patient now also taking po intake  · Completed 5 days cefepime, will give cefdinir 2 more days to complete 7 days antibiotics    Pneumonia  Assessment & Plan  Left-sided pneumonia on imaging  · Received a dose of cefepime and vancomycin in the ER  · Completed 5 days cefepime, will give 2 days cefdinir to complete 7 day course antibiotics  · D/C'ed vanco as MRSA screen neg  · Urine antigens neg  · Continue scheduled nebulizer treatment    Acute respiratory failure with hypoxia (HCC)  Assessment & Plan  · O2 sat of 80% on room air prior ER documentation  · Previously requiring 6 L of oxygen but currently on room air  · Due to pneumonia +/- pulm edema  · S/p 1 dose 20 mg IV lasix  · Pt stable on room air    Acute cystitis without hematuria  Assessment & Plan  · Based on UA  · urine culture growing Proteus  · Completed 5 days cefepime, will give cefdinir for 2 more days to complete 7 days abx    Conjunctivitis  Assessment & Plan  · Patient was having thick yellowish drainage from eyes. Per daughter, pt has had this in the past and was treated with eyedrops with resolution  · Started on Cipro drops    Goals of care, counseling/discussion  Assessment & Plan  Patient with severe dementia and per daughter has been rapidly declining over the last 3 to 4 months  · D/W patient's daughter at bedside that his overall prognosis at this time is guarded. Per discussion with daughter, no aggressive treatments planned.   Continue with antibiotics to see if patient improves but if patient were to decline, daughter wants to be contacted and she will most likely transition him to comfort care/hospice at that time. Hilar mass  Assessment & Plan  Patient noted to have hilar mass in the left hilum  · Per radiologist, imaging from [facility] was reviewed and still concerns for hilar mass. Per radiologist can obtain CT chest with contrast for further evaluation  · Discussed with daughter again today and at this time we will hold off on any further testing as it will not  as there is no plans to further pursue this even if the mass is concerning    Essential hypertension  Assessment & Plan  · Continue labetalol. BPs stable. Patient did become hypotensive in the ER  · Monitor blood pressures  · BP has been stable    Severe dementia ([address])  Assessment & Plan  · Per Daughter, patient with severe dementia and is oriented to self only at baseline  · Continue home medications    Encephalopathy  Assessment & Plan  Patient noted to be only responsive at nursing home. Patient does have baseline dementia but can usually hold a conversation  · Likely due to infection  · Mental Status continues to improve and essentially at baseline per nursing staff and daughter  · CT head negative for acute pathology      Medical Problems     Resolved Problems  Date Reviewed: 7/4/2023   None       Discharging Physician / Practitioner: Jaclyn Lima PA-C  PCP: Guerita Bell DO  Admission Date:   Admission Orders (From admission, onward)     Ordered        06/30/23 1425  INPATIENT ADMISSION  Once                      Discharge Date: 07/04/23    Consultations During Hospital Stay:  · none    Procedures Performed:   · none    Significant Findings / Test Results:   · CXR: Development of suspected left basal infiltrate  · CT head: No acute intracranial abnormality.   · CT c/a/p: Asymmetric pulmonary edema on the left, with left basilar consolidation likely aspiration pneumonitis or pneumonia. Fullness in the left hilum with narrowing of the proximal lingular and left lower lobe airways raising concern for a hilar mass. Patient had a chest CT at [facility] dated 1/21/2018 which has been requested for comparison, and a addendum will be dictated to this report when those images arrive. Coronary artery calcifications which are an indicator of coronary artery disease. Bladder is collapsed, but there is stranding of the fat surrounding the bladder likely due to cystitis. Fecal impaction. No bowel obstruction. · ADDENDUM: Previous films from [facility] dated January 21, 2018 were received the left hilar region looks slightly bulkier than on the prior exam and left hilar tumor is not excluded. Incidental Findings:   · Noted above in CT c/a/p, family declines further workup  · I reviewed the above mentioned incidental findings with the patient and/or family and they expressed understanding. Test Results Pending at Discharge (will require follow up):   · none     Outpatient Tests Requested:  · none    Complications:  none    Reason for Admission: severe sepsis, pneumonia, UTI, encepahlopathy    Hospital Course:   Timoteo Son is a 81 y.o. male patient who originally presented to the hospital on 6/30/2023 due to lethargy from rehab. He was minimally responsive and worsening confusion from baseline. He was hypoxic and became hypotensive in ED. Workup in ED revealed pneumonia and UTI. Urine culture grew proteus. He improved with IVF, abx, and nebulizer treatments. He has remained stable on room air and mentation back at baseline. He completed 5 days of cefepime and will continue 2 more days of cefdinir. [facility] [place]. Please see above list of diagnoses and related plan for additional information.      Condition at Discharge: stable    Discharge Day Visit / Exam:   Subjective:  Patient was sleeping this morning but opened his eyes to my voice. Per RN, he has been eating all his meals and is responsive to touch/stimuli. Vitals: Blood Pressure: 122/77 (07/04/23 0747)  Pulse: 63 (07/04/23 0747)  Temperature: 97.8 °F (36.6 °C) (07/04/23 0747)  Temp Source: Axillary (07/04/23 0747)  Respirations: 18 (07/04/23 0747)  Height: 5' 7" (170.2 cm) (06/30/23 1423)  Weight - Scale: 63.9 kg (140 lb 14 oz) (06/30/23 1203)  SpO2: 93 % (07/04/23 0834)  Exam:   Physical Exam  Vitals and nursing note reviewed. Constitutional:       General: He is not in acute distress. Appearance: He is well-developed. He is ill-appearing (chronic). Cardiovascular:      Rate and Rhythm: Normal rate and regular rhythm. Pulmonary:      Effort: Pulmonary effort is normal. No respiratory distress. Abdominal:      Palpations: Abdomen is soft. Tenderness: There is no abdominal tenderness. Musculoskeletal:         General: No swelling. Skin:     General: Skin is warm and dry. Capillary Refill: Capillary refill takes less than 2 seconds. Neurological:      Comments: Sleeping, opens eyes to voice/stimuli   Psychiatric:         Mood and Affect: Mood normal.         Discussion with Family: Attempted to update  (daughter) via phone. Left voicemail. Discharge instructions/Information to patient and family:   See after visit summary for information provided to patient and family. Provisions for Follow-Up Care:  See after visit summary for information related to follow-up care and any pertinent home health orders. Disposition:   Other [address] at [facility] at [address] [place] [address] Readmission: none     Discharge Statement:  I spent >30 minutes discharging the patient. This time was spent on the day of discharge. I had direct contact with the patient on the day of discharge.  Greater than 50% of the total time was spent examining patient, answering all patient questions, arranging and discussing plan of care with patient as well as directly providing post-discharge instructions. Additional time then spent on discharge activities. Discharge Medications:  See after visit summary for reconciled discharge medications provided to patient and/or family.       **Please Note: This note may have been constructed using a voice recognition system**

## 2023-07-04 NOTE — NURSING NOTE
Marquise's IV removed. Tarun Removed. All belongings with pt. [facility] here to transport pt to [facility].   Pt's daughter aware

## 2023-07-05 LAB
BACTERIA BLD CULT: NORMAL
BACTERIA BLD CULT: NORMAL

## 2023-08-31 PROBLEM — H10.9 CONJUNCTIVITIS: Status: RESOLVED | Noted: 2023-07-02 | Resolved: 2023-08-31

## 2023-09-02 PROBLEM — A41.9 SEVERE SEPSIS (HCC): Status: RESOLVED | Noted: 2023-06-30 | Resolved: 2023-09-02

## 2023-09-02 PROBLEM — N30.00 ACUTE CYSTITIS WITHOUT HEMATURIA: Status: RESOLVED | Noted: 2023-06-30 | Resolved: 2023-09-02

## 2023-09-02 PROBLEM — J18.9 PNEUMONIA: Status: RESOLVED | Noted: 2023-06-30 | Resolved: 2023-09-02

## 2023-09-02 PROBLEM — R65.20 SEVERE SEPSIS (HCC): Status: RESOLVED | Noted: 2023-06-30 | Resolved: 2023-09-02

## 2023-10-18 ENCOUNTER — APPOINTMENT (EMERGENCY)
Dept: RADIOLOGY | Facility: HOSPITAL | Age: 83
DRG: 871 | End: 2023-10-18
Payer: MEDICARE

## 2023-10-18 ENCOUNTER — HOSPITAL ENCOUNTER (INPATIENT)
Facility: HOSPITAL | Age: 83
LOS: 6 days | Discharge: NURSING, REHAB OR LONG-TERM CARE | DRG: 871 | End: 2023-10-24
Attending: EMERGENCY MEDICINE | Admitting: INTERNAL MEDICINE
Payer: MEDICARE

## 2023-10-18 DIAGNOSIS — J18.9 PNEUMONIA: ICD-10-CM

## 2023-10-18 DIAGNOSIS — R50.9 FEVER: Primary | ICD-10-CM

## 2023-10-18 DIAGNOSIS — I10 ESSENTIAL HYPERTENSION: ICD-10-CM

## 2023-10-18 DIAGNOSIS — I48.91 A-FIB (HCC): ICD-10-CM

## 2023-10-18 DIAGNOSIS — R79.89 ELEVATED TROPONIN: ICD-10-CM

## 2023-10-18 DIAGNOSIS — A41.9 SEPSIS WITHOUT ACUTE ORGAN DYSFUNCTION, DUE TO UNSPECIFIED ORGANISM (HCC): ICD-10-CM

## 2023-10-18 DIAGNOSIS — I48.91 ATRIAL FIBRILLATION WITH RAPID VENTRICULAR RESPONSE (HCC): ICD-10-CM

## 2023-10-18 DIAGNOSIS — R33.9 URINARY RETENTION: ICD-10-CM

## 2023-10-18 LAB
2HR DELTA HS TROPONIN: -65 NG/L
ALBUMIN SERPL BCP-MCNC: 3.5 G/DL (ref 3.5–5)
ALP SERPL-CCNC: 61 U/L (ref 34–104)
ALT SERPL W P-5'-P-CCNC: 8 U/L (ref 7–52)
ANION GAP SERPL CALCULATED.3IONS-SCNC: 10 MMOL/L
APTT PPP: 33 SECONDS (ref 23–37)
AST SERPL W P-5'-P-CCNC: 15 U/L (ref 13–39)
BACTERIA UR QL AUTO: ABNORMAL /HPF
BASOPHILS # BLD MANUAL: 0 THOUSAND/UL (ref 0–0.1)
BASOPHILS NFR MAR MANUAL: 0 % (ref 0–1)
BILIRUB SERPL-MCNC: 0.9 MG/DL (ref 0.2–1)
BILIRUB UR QL STRIP: NEGATIVE
BUN SERPL-MCNC: 37 MG/DL (ref 5–25)
CALCIUM SERPL-MCNC: 9.7 MG/DL (ref 8.4–10.2)
CARDIAC TROPONIN I PNL SERPL HS: 385 NG/L
CARDIAC TROPONIN I PNL SERPL HS: 450 NG/L
CHLORIDE SERPL-SCNC: 107 MMOL/L (ref 96–108)
CLARITY UR: CLEAR
CO2 SERPL-SCNC: 25 MMOL/L (ref 21–32)
COLOR UR: YELLOW
CREAT SERPL-MCNC: 1.05 MG/DL (ref 0.6–1.3)
DOHLE BOD BLD QL SMEAR: PRESENT
EOSINOPHIL # BLD MANUAL: 0 THOUSAND/UL (ref 0–0.4)
EOSINOPHIL NFR BLD MANUAL: 0 % (ref 0–6)
ERYTHROCYTE [DISTWIDTH] IN BLOOD BY AUTOMATED COUNT: 14.9 % (ref 11.6–15.1)
GFR SERPL CREATININE-BSD FRML MDRD: 65 ML/MIN/1.73SQ M
GLUCOSE SERPL-MCNC: 143 MG/DL (ref 65–140)
GLUCOSE UR STRIP-MCNC: NEGATIVE MG/DL
HCT VFR BLD AUTO: 42.8 % (ref 36.5–49.3)
HGB BLD-MCNC: 14.1 G/DL (ref 12–17)
HGB UR QL STRIP.AUTO: NEGATIVE
INR PPP: 1.3 (ref 0.84–1.19)
KETONES UR STRIP-MCNC: NEGATIVE MG/DL
LACTATE SERPL-SCNC: 1.6 MMOL/L (ref 0.5–2)
LEUKOCYTE ESTERASE UR QL STRIP: NEGATIVE
LYMPHOCYTES # BLD AUTO: 0.62 THOUSAND/UL (ref 0.6–4.47)
LYMPHOCYTES # BLD AUTO: 6 % (ref 14–44)
MCH RBC QN AUTO: 29.4 PG (ref 26.8–34.3)
MCHC RBC AUTO-ENTMCNC: 32.9 G/DL (ref 31.4–37.4)
MCV RBC AUTO: 89 FL (ref 82–98)
MONOCYTES # BLD AUTO: 0.73 THOUSAND/UL (ref 0–1.22)
MONOCYTES NFR BLD: 7 % (ref 4–12)
MUCOUS THREADS UR QL AUTO: ABNORMAL
NEUTROPHILS # BLD MANUAL: 9.01 THOUSAND/UL (ref 1.85–7.62)
NEUTS BAND NFR BLD MANUAL: 8 % (ref 0–8)
NEUTS SEG NFR BLD AUTO: 79 % (ref 43–75)
NITRITE UR QL STRIP: NEGATIVE
NON-SQ EPI CELLS URNS QL MICRO: ABNORMAL /HPF
OTHER STN SPEC: ABNORMAL
PH UR STRIP.AUTO: 6 [PH]
PLATELET # BLD AUTO: 192 THOUSANDS/UL (ref 149–390)
PLATELET BLD QL SMEAR: ADEQUATE
PMV BLD AUTO: 11.8 FL (ref 8.9–12.7)
POTASSIUM SERPL-SCNC: 4.3 MMOL/L (ref 3.5–5.3)
PROCALCITONIN SERPL-MCNC: 5.54 NG/ML
PROT SERPL-MCNC: 6.9 G/DL (ref 6.4–8.4)
PROT UR STRIP-MCNC: ABNORMAL MG/DL
PROTHROMBIN TIME: 16.3 SECONDS (ref 11.6–14.5)
RBC # BLD AUTO: 4.8 MILLION/UL (ref 3.88–5.62)
RBC #/AREA URNS AUTO: ABNORMAL /HPF
RBC MORPH BLD: NORMAL
SODIUM SERPL-SCNC: 142 MMOL/L (ref 135–147)
SP GR UR STRIP.AUTO: 1.02 (ref 1–1.03)
UROBILINOGEN UR QL STRIP.AUTO: 0.2 E.U./DL
WBC # BLD AUTO: 10.36 THOUSAND/UL (ref 4.31–10.16)
WBC #/AREA URNS AUTO: ABNORMAL /HPF

## 2023-10-18 PROCEDURE — 99291 CRITICAL CARE FIRST HOUR: CPT | Performed by: EMERGENCY MEDICINE

## 2023-10-18 PROCEDURE — 83036 HEMOGLOBIN GLYCOSYLATED A1C: CPT | Performed by: NURSE PRACTITIONER

## 2023-10-18 PROCEDURE — 85007 BL SMEAR W/DIFF WBC COUNT: CPT | Performed by: EMERGENCY MEDICINE

## 2023-10-18 PROCEDURE — 0241U HB NFCT DS VIR RESP RNA 4 TRGT: CPT | Performed by: EMERGENCY MEDICINE

## 2023-10-18 PROCEDURE — 36415 COLL VENOUS BLD VENIPUNCTURE: CPT | Performed by: EMERGENCY MEDICINE

## 2023-10-18 PROCEDURE — 87040 BLOOD CULTURE FOR BACTERIA: CPT | Performed by: EMERGENCY MEDICINE

## 2023-10-18 PROCEDURE — 81001 URINALYSIS AUTO W/SCOPE: CPT | Performed by: EMERGENCY MEDICINE

## 2023-10-18 PROCEDURE — 99222 1ST HOSP IP/OBS MODERATE 55: CPT | Performed by: NURSE PRACTITIONER

## 2023-10-18 PROCEDURE — 80053 COMPREHEN METABOLIC PANEL: CPT | Performed by: EMERGENCY MEDICINE

## 2023-10-18 PROCEDURE — 0T9B70Z DRAINAGE OF BLADDER WITH DRAINAGE DEVICE, VIA NATURAL OR ARTIFICIAL OPENING: ICD-10-PCS | Performed by: EMERGENCY MEDICINE

## 2023-10-18 PROCEDURE — 85610 PROTHROMBIN TIME: CPT | Performed by: EMERGENCY MEDICINE

## 2023-10-18 PROCEDURE — 83735 ASSAY OF MAGNESIUM: CPT | Performed by: NURSE PRACTITIONER

## 2023-10-18 PROCEDURE — 83605 ASSAY OF LACTIC ACID: CPT | Performed by: EMERGENCY MEDICINE

## 2023-10-18 PROCEDURE — 93005 ELECTROCARDIOGRAM TRACING: CPT

## 2023-10-18 PROCEDURE — 96365 THER/PROPH/DIAG IV INF INIT: CPT

## 2023-10-18 PROCEDURE — 84145 PROCALCITONIN (PCT): CPT | Performed by: EMERGENCY MEDICINE

## 2023-10-18 PROCEDURE — 87086 URINE CULTURE/COLONY COUNT: CPT | Performed by: NURSE PRACTITIONER

## 2023-10-18 PROCEDURE — 84484 ASSAY OF TROPONIN QUANT: CPT | Performed by: EMERGENCY MEDICINE

## 2023-10-18 PROCEDURE — 99285 EMERGENCY DEPT VISIT HI MDM: CPT

## 2023-10-18 PROCEDURE — 85730 THROMBOPLASTIN TIME PARTIAL: CPT | Performed by: EMERGENCY MEDICINE

## 2023-10-18 PROCEDURE — 71045 X-RAY EXAM CHEST 1 VIEW: CPT

## 2023-10-18 PROCEDURE — 85027 COMPLETE CBC AUTOMATED: CPT | Performed by: EMERGENCY MEDICINE

## 2023-10-18 PROCEDURE — 96375 TX/PRO/DX INJ NEW DRUG ADDON: CPT

## 2023-10-18 PROCEDURE — 96367 TX/PROPH/DG ADDL SEQ IV INF: CPT

## 2023-10-18 RX ORDER — HEPARIN SODIUM 1000 [USP'U]/ML
3600 INJECTION, SOLUTION INTRAVENOUS; SUBCUTANEOUS ONCE
Status: COMPLETED | OUTPATIENT
Start: 2023-10-18 | End: 2023-10-18

## 2023-10-18 RX ORDER — ACETAMINOPHEN 650 MG/1
650 SUPPOSITORY RECTAL ONCE
Status: COMPLETED | OUTPATIENT
Start: 2023-10-18 | End: 2023-10-18

## 2023-10-18 RX ORDER — ACETAMINOPHEN 650 MG/1
650 SUPPOSITORY RECTAL ONCE
Status: DISCONTINUED | OUTPATIENT
Start: 2023-10-18 | End: 2023-10-18 | Stop reason: SDUPTHER

## 2023-10-18 RX ORDER — DILTIAZEM HYDROCHLORIDE 5 MG/ML
15 INJECTION INTRAVENOUS ONCE
Status: COMPLETED | OUTPATIENT
Start: 2023-10-18 | End: 2023-10-18

## 2023-10-18 RX ORDER — HEPARIN SODIUM 1000 [USP'U]/ML
3600 INJECTION, SOLUTION INTRAVENOUS; SUBCUTANEOUS EVERY 6 HOURS PRN
Status: DISCONTINUED | OUTPATIENT
Start: 2023-10-18 | End: 2023-10-20

## 2023-10-18 RX ORDER — CEFTRIAXONE 1 G/50ML
1000 INJECTION, SOLUTION INTRAVENOUS ONCE
Status: COMPLETED | OUTPATIENT
Start: 2023-10-18 | End: 2023-10-18

## 2023-10-18 RX ORDER — HEPARIN SODIUM 1000 [USP'U]/ML
1800 INJECTION, SOLUTION INTRAVENOUS; SUBCUTANEOUS EVERY 6 HOURS PRN
Status: DISCONTINUED | OUTPATIENT
Start: 2023-10-18 | End: 2023-10-20

## 2023-10-18 RX ORDER — HEPARIN SODIUM 10000 [USP'U]/100ML
3-20 INJECTION, SOLUTION INTRAVENOUS
Status: DISCONTINUED | OUTPATIENT
Start: 2023-10-18 | End: 2023-10-20

## 2023-10-18 RX ADMIN — SODIUM CHLORIDE 500 ML: 0.9 INJECTION, SOLUTION INTRAVENOUS at 21:31

## 2023-10-18 RX ADMIN — HEPARIN SODIUM 3600 UNITS: 1000 INJECTION INTRAVENOUS; SUBCUTANEOUS at 22:45

## 2023-10-18 RX ADMIN — ACETAMINOPHEN 650 MG: 650 SUPPOSITORY RECTAL at 21:08

## 2023-10-18 RX ADMIN — CEFTRIAXONE 1000 MG: 1 INJECTION, SOLUTION INTRAVENOUS at 21:52

## 2023-10-18 RX ADMIN — SODIUM CHLORIDE 1000 ML: 0.9 INJECTION, SOLUTION INTRAVENOUS at 23:11

## 2023-10-18 RX ADMIN — HEPARIN SODIUM 12 UNITS/KG/HR: 10000 INJECTION, SOLUTION INTRAVENOUS at 22:46

## 2023-10-18 RX ADMIN — AZITHROMYCIN MONOHYDRATE 500 MG: 500 INJECTION, POWDER, LYOPHILIZED, FOR SOLUTION INTRAVENOUS at 23:11

## 2023-10-18 RX ADMIN — DILTIAZEM HYDROCHLORIDE 15 MG: 5 INJECTION INTRAVENOUS at 23:55

## 2023-10-18 NOTE — Clinical Note
Case was discussed with CC and the patient's admission status was agreed to be Admission Status: inpatient status to the service of Dr. Emmy Burch.

## 2023-10-19 ENCOUNTER — APPOINTMENT (INPATIENT)
Dept: NON INVASIVE DIAGNOSTICS | Facility: HOSPITAL | Age: 83
DRG: 871 | End: 2023-10-19
Payer: MEDICARE

## 2023-10-19 ENCOUNTER — APPOINTMENT (INPATIENT)
Dept: RADIOLOGY | Facility: HOSPITAL | Age: 83
DRG: 871 | End: 2023-10-19
Payer: MEDICARE

## 2023-10-19 PROBLEM — I25.10 CAD (CORONARY ARTERY DISEASE): Status: ACTIVE | Noted: 2023-10-19

## 2023-10-19 PROBLEM — E43 SEVERE PROTEIN-CALORIE MALNUTRITION (HCC): Status: ACTIVE | Noted: 2023-10-19

## 2023-10-19 LAB
4HR DELTA HS TROPONIN: -170 NG/L
ANION GAP SERPL CALCULATED.3IONS-SCNC: 8 MMOL/L
AORTIC ROOT: 3.4 CM
APICAL FOUR CHAMBER EJECTION FRACTION: 75 %
APTT PPP: 50 SECONDS (ref 23–37)
APTT PPP: 59 SECONDS (ref 23–37)
APTT PPP: 62 SECONDS (ref 23–37)
BUN SERPL-MCNC: 33 MG/DL (ref 5–25)
CALCIUM SERPL-MCNC: 8.8 MG/DL (ref 8.4–10.2)
CARDIAC TROPONIN I PNL SERPL HS: 225 NG/L (ref 8–18)
CARDIAC TROPONIN I PNL SERPL HS: 280 NG/L
CHLORIDE SERPL-SCNC: 114 MMOL/L (ref 96–108)
CHOLEST SERPL-MCNC: 110 MG/DL
CO2 SERPL-SCNC: 24 MMOL/L (ref 21–32)
CREAT SERPL-MCNC: 0.81 MG/DL (ref 0.6–1.3)
E WAVE DECELERATION TIME: 176 MS
E/A RATIO: 0.69
ERYTHROCYTE [DISTWIDTH] IN BLOOD BY AUTOMATED COUNT: 14.8 % (ref 11.6–15.1)
EST. AVERAGE GLUCOSE BLD GHB EST-MCNC: 111 MG/DL
FLUAV RNA RESP QL NAA+PROBE: NEGATIVE
FLUBV RNA RESP QL NAA+PROBE: NEGATIVE
FRACTIONAL SHORTENING: 32 (ref 28–44)
GFR SERPL CREATININE-BSD FRML MDRD: 82 ML/MIN/1.73SQ M
GLUCOSE SERPL-MCNC: 116 MG/DL (ref 65–140)
HBA1C MFR BLD: 5.5 %
HCT VFR BLD AUTO: 36.2 % (ref 36.5–49.3)
HDLC SERPL-MCNC: 19 MG/DL
HGB BLD-MCNC: 11.5 G/DL (ref 12–17)
INTERVENTRICULAR SEPTUM IN DIASTOLE (PARASTERNAL SHORT AXIS VIEW): 1.3 CM
INTERVENTRICULAR SEPTUM: 1.3 CM (ref 0.6–1.1)
LAAS-AP2: 17.5 CM2
LAAS-AP4: 18.3 CM2
LDLC SERPL CALC-MCNC: 72 MG/DL (ref 0–100)
LEFT ATRIUM SIZE: 3.4 CM
LEFT ATRIUM VOLUME (MOD BIPLANE): 46 ML
LEFT ATRIUM VOLUME INDEX (MOD BIPLANE): 28.6 ML/M2
LEFT INTERNAL DIMENSION IN SYSTOLE: 2.6 CM (ref 2.1–4)
LEFT VENTRICULAR INTERNAL DIMENSION IN DIASTOLE: 3.8 CM (ref 3.5–6)
LEFT VENTRICULAR POSTERIOR WALL IN END DIASTOLE: 1 CM
LEFT VENTRICULAR STROKE VOLUME: 38 ML
LVSV (TEICH): 38 ML
MAGNESIUM SERPL-MCNC: 2.2 MG/DL (ref 1.9–2.7)
MAGNESIUM SERPL-MCNC: 2.4 MG/DL (ref 1.9–2.7)
MCH RBC QN AUTO: 29 PG (ref 26.8–34.3)
MCHC RBC AUTO-ENTMCNC: 31.8 G/DL (ref 31.4–37.4)
MCV RBC AUTO: 91 FL (ref 82–98)
MV E'TISSUE VEL-LAT: 6 CM/S
MV E'TISSUE VEL-SEP: 7 CM/S
MV PEAK A VEL: 0.83 M/S
MV PEAK E VEL: 57 CM/S
MV STENOSIS PRESSURE HALF TIME: 51 MS
MV VALVE AREA P 1/2 METHOD: 4.31
PLATELET # BLD AUTO: 160 THOUSANDS/UL (ref 149–390)
PMV BLD AUTO: 11.6 FL (ref 8.9–12.7)
POTASSIUM SERPL-SCNC: 3.7 MMOL/L (ref 3.5–5.3)
PROCALCITONIN SERPL-MCNC: 3.86 NG/ML
QRS AXIS: -65 DEGREES
QRSD INTERVAL: 84 MS
QT INTERVAL: 314 MS
QTC INTERVAL: 479 MS
RBC # BLD AUTO: 3.97 MILLION/UL (ref 3.88–5.62)
RIGHT ATRIUM AREA SYSTOLE A4C: 21.6 CM2
RIGHT VENTRICLE ID DIMENSION: 3.3 CM
RSV RNA RESP QL NAA+PROBE: NEGATIVE
SARS-COV-2 RNA RESP QL NAA+PROBE: NEGATIVE
SL CV LEFT ATRIUM LENGTH A2C: 5.5 CM
SL CV LV EF: 61
SL CV PED ECHO LEFT VENTRICLE DIASTOLIC VOLUME (MOD BIPLANE) 2D: 63 ML
SL CV PED ECHO LEFT VENTRICLE SYSTOLIC VOLUME (MOD BIPLANE) 2D: 24 ML
SODIUM SERPL-SCNC: 146 MMOL/L (ref 135–147)
T WAVE AXIS: 84 DEGREES
T4 FREE SERPL-MCNC: 0.99 NG/DL (ref 0.61–1.12)
TR MAX PG: 29 MMHG
TR PEAK VELOCITY: 2.7 M/S
TRICUSPID ANNULAR PLANE SYSTOLIC EXCURSION: 1.7 CM
TRICUSPID VALVE PEAK REGURGITATION VELOCITY: 2.68 M/S
TRIGL SERPL-MCNC: 96 MG/DL
TSH SERPL DL<=0.05 MIU/L-ACNC: 0.57 UIU/ML (ref 0.45–4.5)
VENTRICULAR RATE: 140 BPM
WBC # BLD AUTO: 7.73 THOUSAND/UL (ref 4.31–10.16)

## 2023-10-19 PROCEDURE — 80061 LIPID PANEL: CPT | Performed by: NURSE PRACTITIONER

## 2023-10-19 PROCEDURE — 84443 ASSAY THYROID STIM HORMONE: CPT | Performed by: NURSE PRACTITIONER

## 2023-10-19 PROCEDURE — 85730 THROMBOPLASTIN TIME PARTIAL: CPT | Performed by: NURSE PRACTITIONER

## 2023-10-19 PROCEDURE — 36415 COLL VENOUS BLD VENIPUNCTURE: CPT | Performed by: EMERGENCY MEDICINE

## 2023-10-19 PROCEDURE — 93010 ELECTROCARDIOGRAM REPORT: CPT | Performed by: INTERNAL MEDICINE

## 2023-10-19 PROCEDURE — 97163 PT EVAL HIGH COMPLEX 45 MIN: CPT

## 2023-10-19 PROCEDURE — 85730 THROMBOPLASTIN TIME PARTIAL: CPT | Performed by: INTERNAL MEDICINE

## 2023-10-19 PROCEDURE — 87081 CULTURE SCREEN ONLY: CPT | Performed by: NURSE PRACTITIONER

## 2023-10-19 PROCEDURE — G1004 CDSM NDSC: HCPCS

## 2023-10-19 PROCEDURE — 93306 TTE W/DOPPLER COMPLETE: CPT

## 2023-10-19 PROCEDURE — 97167 OT EVAL HIGH COMPLEX 60 MIN: CPT

## 2023-10-19 PROCEDURE — 92610 EVALUATE SWALLOWING FUNCTION: CPT

## 2023-10-19 PROCEDURE — 84484 ASSAY OF TROPONIN QUANT: CPT | Performed by: EMERGENCY MEDICINE

## 2023-10-19 PROCEDURE — 99223 1ST HOSP IP/OBS HIGH 75: CPT | Performed by: INTERNAL MEDICINE

## 2023-10-19 PROCEDURE — 84439 ASSAY OF FREE THYROXINE: CPT | Performed by: NURSE PRACTITIONER

## 2023-10-19 PROCEDURE — 84484 ASSAY OF TROPONIN QUANT: CPT | Performed by: NURSE PRACTITIONER

## 2023-10-19 PROCEDURE — 99232 SBSQ HOSP IP/OBS MODERATE 35: CPT | Performed by: INTERNAL MEDICINE

## 2023-10-19 PROCEDURE — 83735 ASSAY OF MAGNESIUM: CPT | Performed by: NURSE PRACTITIONER

## 2023-10-19 PROCEDURE — 85027 COMPLETE CBC AUTOMATED: CPT | Performed by: NURSE PRACTITIONER

## 2023-10-19 PROCEDURE — 80048 BASIC METABOLIC PNL TOTAL CA: CPT | Performed by: NURSE PRACTITIONER

## 2023-10-19 PROCEDURE — 93306 TTE W/DOPPLER COMPLETE: CPT | Performed by: INTERNAL MEDICINE

## 2023-10-19 PROCEDURE — 70450 CT HEAD/BRAIN W/O DYE: CPT

## 2023-10-19 PROCEDURE — 94760 N-INVAS EAR/PLS OXIMETRY 1: CPT

## 2023-10-19 PROCEDURE — 84145 PROCALCITONIN (PCT): CPT | Performed by: NURSE PRACTITIONER

## 2023-10-19 RX ORDER — LISINOPRIL 5 MG/1
5 TABLET ORAL DAILY
COMMUNITY

## 2023-10-19 RX ORDER — SENNOSIDES 8.6 MG
1 TABLET ORAL 2 TIMES DAILY
Status: DISCONTINUED | OUTPATIENT
Start: 2023-10-19 | End: 2023-10-24 | Stop reason: HOSPADM

## 2023-10-19 RX ORDER — ACETAMINOPHEN 325 MG/1
650 TABLET ORAL EVERY 6 HOURS PRN
Status: DISCONTINUED | OUTPATIENT
Start: 2023-10-19 | End: 2023-10-24 | Stop reason: HOSPADM

## 2023-10-19 RX ORDER — ASPIRIN 81 MG/1
81 TABLET, CHEWABLE ORAL DAILY
Status: DISCONTINUED | OUTPATIENT
Start: 2023-10-19 | End: 2023-10-24 | Stop reason: HOSPADM

## 2023-10-19 RX ORDER — LISINOPRIL 5 MG/1
5 TABLET ORAL DAILY
Status: DISCONTINUED | OUTPATIENT
Start: 2023-10-19 | End: 2023-10-19

## 2023-10-19 RX ORDER — LEVALBUTEROL INHALATION SOLUTION 0.63 MG/3ML
0.63 SOLUTION RESPIRATORY (INHALATION) EVERY 4 HOURS PRN
Status: DISCONTINUED | OUTPATIENT
Start: 2023-10-19 | End: 2023-10-24 | Stop reason: HOSPADM

## 2023-10-19 RX ORDER — ONDANSETRON 2 MG/ML
4 INJECTION INTRAMUSCULAR; INTRAVENOUS EVERY 6 HOURS PRN
Status: DISCONTINUED | OUTPATIENT
Start: 2023-10-19 | End: 2023-10-24 | Stop reason: HOSPADM

## 2023-10-19 RX ORDER — DONEPEZIL HYDROCHLORIDE 5 MG/1
5 TABLET, FILM COATED ORAL
Status: DISCONTINUED | OUTPATIENT
Start: 2023-10-19 | End: 2023-10-24 | Stop reason: HOSPADM

## 2023-10-19 RX ORDER — LABETALOL 100 MG/1
100 TABLET, FILM COATED ORAL 2 TIMES DAILY
Status: DISCONTINUED | OUTPATIENT
Start: 2023-10-19 | End: 2023-10-19

## 2023-10-19 RX ORDER — FAMOTIDINE 10 MG/ML
20 INJECTION, SOLUTION INTRAVENOUS
Status: DISCONTINUED | OUTPATIENT
Start: 2023-10-19 | End: 2023-10-24 | Stop reason: HOSPADM

## 2023-10-19 RX ORDER — DIGOXIN 0.25 MG/ML
250 INJECTION INTRAMUSCULAR; INTRAVENOUS ONCE
Status: COMPLETED | OUTPATIENT
Start: 2023-10-19 | End: 2023-10-19

## 2023-10-19 RX ORDER — SACCHAROMYCES BOULARDII 250 MG
250 CAPSULE ORAL 2 TIMES DAILY
Status: DISCONTINUED | OUTPATIENT
Start: 2023-10-19 | End: 2023-10-24 | Stop reason: HOSPADM

## 2023-10-19 RX ORDER — POLYVINYL ALCOHOL 14 MG/ML
1 SOLUTION/ DROPS OPHTHALMIC 2 TIMES DAILY
COMMUNITY

## 2023-10-19 RX ORDER — NITROGLYCERIN 0.4 MG/1
0.4 TABLET SUBLINGUAL
Status: DISCONTINUED | OUTPATIENT
Start: 2023-10-19 | End: 2023-10-24 | Stop reason: HOSPADM

## 2023-10-19 RX ORDER — DILTIAZEM HYDROCHLORIDE 5 MG/ML
10 INJECTION INTRAVENOUS ONCE
Status: DISCONTINUED | OUTPATIENT
Start: 2023-10-19 | End: 2023-10-19

## 2023-10-19 RX ORDER — LEVOTHYROXINE SODIUM 0.05 MG/1
50 TABLET ORAL
Status: DISCONTINUED | OUTPATIENT
Start: 2023-10-19 | End: 2023-10-24 | Stop reason: HOSPADM

## 2023-10-19 RX ORDER — SODIUM CHLORIDE, SODIUM LACTATE, POTASSIUM CHLORIDE, CALCIUM CHLORIDE 600; 310; 30; 20 MG/100ML; MG/100ML; MG/100ML; MG/100ML
75 INJECTION, SOLUTION INTRAVENOUS CONTINUOUS
Status: DISCONTINUED | OUTPATIENT
Start: 2023-10-19 | End: 2023-10-20

## 2023-10-19 RX ORDER — IPRATROPIUM BROMIDE AND ALBUTEROL SULFATE 2.5; .5 MG/3ML; MG/3ML
3 SOLUTION RESPIRATORY (INHALATION)
COMMUNITY

## 2023-10-19 RX ADMIN — PIPERACILLIN AND TAZOBACTAM 3.38 G: 36; 4.5 INJECTION, POWDER, FOR SOLUTION INTRAVENOUS at 03:50

## 2023-10-19 RX ADMIN — PIPERACILLIN AND TAZOBACTAM 3.38 G: 36; 4.5 INJECTION, POWDER, FOR SOLUTION INTRAVENOUS at 16:39

## 2023-10-19 RX ADMIN — HEPARIN SODIUM 1800 UNITS: 1000 INJECTION INTRAVENOUS; SUBCUTANEOUS at 13:48

## 2023-10-19 RX ADMIN — Medication 250 MG: at 09:21

## 2023-10-19 RX ADMIN — FAMOTIDINE 20 MG: 10 INJECTION, SOLUTION INTRAVENOUS at 03:32

## 2023-10-19 RX ADMIN — HEPARIN SODIUM 1800 UNITS: 1000 INJECTION INTRAVENOUS; SUBCUTANEOUS at 06:03

## 2023-10-19 RX ADMIN — DONEPEZIL HYDROCHLORIDE 5 MG: 5 TABLET ORAL at 21:02

## 2023-10-19 RX ADMIN — PIPERACILLIN AND TAZOBACTAM 3.38 G: 36; 4.5 INJECTION, POWDER, FOR SOLUTION INTRAVENOUS at 21:02

## 2023-10-19 RX ADMIN — GLYCERIN, HYPROMELLOSE, POLYETHYLENE GLYCOL 1 DROP: .2; .2; 1 LIQUID OPHTHALMIC at 09:21

## 2023-10-19 RX ADMIN — SENNOSIDES 8.6 MG: 8.6 TABLET, FILM COATED ORAL at 17:19

## 2023-10-19 RX ADMIN — LABETALOL HYDROCHLORIDE 100 MG: 100 TABLET, FILM COATED ORAL at 09:20

## 2023-10-19 RX ADMIN — Medication 250 MG: at 17:19

## 2023-10-19 RX ADMIN — METOPROLOL TARTRATE 25 MG: 25 TABLET, FILM COATED ORAL at 21:02

## 2023-10-19 RX ADMIN — METOPROLOL TARTRATE 25 MG: 25 TABLET, FILM COATED ORAL at 12:25

## 2023-10-19 RX ADMIN — DIGOXIN 250 MCG: 0.25 INJECTION INTRAMUSCULAR; INTRAVENOUS at 04:25

## 2023-10-19 RX ADMIN — SENNOSIDES 8.6 MG: 8.6 TABLET, FILM COATED ORAL at 09:21

## 2023-10-19 RX ADMIN — GLYCERIN, HYPROMELLOSE, POLYETHYLENE GLYCOL 1 DROP: .2; .2; 1 LIQUID OPHTHALMIC at 17:19

## 2023-10-19 RX ADMIN — ASPIRIN 81 MG CHEWABLE TABLET 81 MG: 81 TABLET CHEWABLE at 09:20

## 2023-10-19 RX ADMIN — PIPERACILLIN AND TAZOBACTAM 3.38 G: 36; 4.5 INJECTION, POWDER, FOR SOLUTION INTRAVENOUS at 09:32

## 2023-10-19 RX ADMIN — SODIUM CHLORIDE, SODIUM LACTATE, POTASSIUM CHLORIDE, AND CALCIUM CHLORIDE 75 ML/HR: .6; .31; .03; .02 INJECTION, SOLUTION INTRAVENOUS at 03:13

## 2023-10-19 NOTE — ASSESSMENT & PLAN NOTE
In the setting of sepsis. Episodes of A-fib with RVR during hospitalization in June to July 2023 in the setting of severe sepsis per chart review. History of PACs per chart review. Will give Cardizem 15mg IV x1.   Continue labetalol  Telemetry  Check 2D echo  Optimize electrolytes  On heparin drip as above  Consult cardiology

## 2023-10-19 NOTE — PROGRESS NOTES
[facility] Internal Medicine Progress Note  Patient: Kymberly Thomson 81 y.o. male   MRN: [id number]  PCP: Jordin Hagan DO  Unit/Bed#: [address] [place] [address] Encounter: [id number]  Date Of Visit: 10/19/23    Problem List:    Principal Problem:    Acute respiratory failure with hypoxia ([address])  Active Problems:    Acute encephalopathy    Sepsis ([address])    Severe dementia ([address])    Essential hypertension    Urinary retention    Atrial fibrillation with rapid ventricular response (HCC)    Elevated troponin    CAD (coronary artery disease)    Severe protein-calorie malnutrition ([address])      Assessment & Plan:    Sepsis ([address])  Assessment & Plan  POA, as evidenced by fever, tachycardia, with suspected source of infection right lower lobe pneumonia, likely aspiration in nature. Remains afebrile, hemodynamically stable. Lactic acid normal  Procalcitonin 5.54 initially, downtrending  UA shows moderate bacteria, no white count, no nitrite, no leukocytes. Chest x-ray with evidence of pneumonia, left base  Continue IV Zosyn as above  Follow-up blood cultures, urine cultures  Gender IV hydration for 1 day  Repeat CBC, procalcitonin in the morning    Acute encephalopathy  Assessment & Plan  Most likely toxic metabolic encephalopathy due to sepsis. CT head without any acute overload, evidence of prior CVA noted  Treat underlying cause  Patient's eyes open, good eye contact, nonverbal, following simple commands. Zambian speaking at baseline. Monitor mental status    * Acute respiratory failure with hypoxia [facility])  Assessment & Plan  Patient presents with hypoxia, SPO2 in 80s on room air since yesterday in nursing home, was placed on O2 5 L in nursing home to get SPO2 above 90%. Per staff, patient has not been eating or drinking much for at least 2 days, having cough with nectar thick liquids, having audible wheezing, not responding as he normally does for at least 2 days.   At baseline patient is able to talk a few words, able to feed self, able to follow simple directions and able to do puzzle and stack blocks per staff. Patient has not been able to do all of the above for about one week. Had a chest x-ray yesterday in nursing home which was unremarkable. Patient was given Levaquin 500mg p.o. prior to coming to ED. No fever in nursing home per staff. Chart reviewed. Patient was hospitalized between 6/30-7/4/2023 for severe sepsis secondary to left-sided pneumonia, acute hypoxic respiratory failure, UTI. Patient was treated with cefepime and cefdinir for 7 days total.  Patient was found to have hilar mass on CT without contrast.  Radiology recommended CT chest with contrast, however daughter declined the test as she would not further pursue any treatment if the mass was concerning. Chest x-ray evidence of increased opacity in the left base  Fever 101.4 in ED, patient was in A-fib with RVR intermittently in ED, meets sepsis criteria. Management as below. Procalcitonin 5.54. Now afebrile, remains hemodynamically stable. Now saturating mid 90s on room air at rest.  Likely secondary to pneumonia and sepsis with encephalopathy  Patient given Rocephin Zithromax in ED. continue Zosyn. Speech eval-recommended purée with honey thick liquid, aspiration precaution  Gentle IV hydration  Xopenex as needed, chest PT as tolerated  Continue supplemental oxygen to maintain sats above 90%. CAD (coronary artery disease)  Assessment & Plan  Status post angioplasty in 1999. On baby aspirin labetalol in nursing home. No longer taking statin. Continue baby aspirin labetalol  LDL 72    Elevated troponin  Assessment & Plan  Troponin 450->385  Initial EKG showed sinus tach with premature supraventricular complexes, rate 122, ST depression in lateral leads.   Repeat EKG showed A-fib with RVR, rate 140  Likely type II MI secondary to sepsis and  tachycardia  Trend troponin  Telemetry  Follow-up 2D echo  Patient was started on heparin drip in ED, will continue  Consult cardiology-input appreciated  Continue medical management    Atrial fibrillation with rapid ventricular response ([address])  Assessment & Plan  In the setting of sepsis. Episodes of A-fib with RVR during hospitalization in June to July 2023 in the setting of severe sepsis per chart review. History of PACs per chart review. Rate controlled currently  Currently on metoprolol  Telemetry  Follow-up 2D echo  Optimize electrolytes  On heparin drip as above  Cardiology following, input appreciated  Discussed with family regarding risk and benefits of long-term anticoagulation. No history of bleeding in the past.  History of fall previously but currently patient is bedbound. Continue anticoagulation at present for stroke prevention    Urinary retention  Assessment & Plan  Appears to be acute. Similar history in the past  Calderon inserted in ED, initial urine output 1000 mL. Bladder scan showed 800 mL prior to insertion. Continue Calderon catheter  Patient is on DuoNeb 3 times daily and every 6 hours as needed in nursing home. Will change to Xopenex as needed. Consult urology    Essential hypertension  Assessment & Plan  On lisinopril, labetalol in nursing home. BP was in 200/100s last week per staff. Currently blood pressure stable  Hold lisinopril  Labetalol was changed to metoprolol  Monitor BP closely    Severe dementia [facility])  Assessment & Plan  Nursing home resident. On puréed, nectar thick liquids in nursing home. Essentially wheelchair-bound. Incontinent of bowel and bladder. Patient talks minimally at baseline, Chinese-speaking only. Continue Aricept  Supportive care  Overall guarded prognosis, discussed with daughter over the phone.   Advanced directives verified, patient is DNR/DNI    Severe protein-calorie malnutrition ([address])  Assessment & Plan  Malnutrition Findings:   Adult Malnutrition type: Chronic illness  Adult Degree of Malnutrition: Other severe protein calorie malnutrition  Malnutrition Characteristics: Fat loss, Muscle loss                  360 Statement: Severe protein calorie malnutrition of chronic illness related to inadequate energy intake as evidenced by hollow orbits, depression at the temples, protruding clavicles. Treated with modified diet and supplements    BMI Findings:  Adult BMI Classifications: Underweight < 18.5        Body mass index is 18.44 kg/m². VTE Pharmacologic Prophylaxis:   Moderate Risk (Score 3-4) - Pharmacological DVT Prophylaxis Ordered: heparin drip. Patient Centered Rounds: I performed bedside rounds with nursing staff today. Discussions with Specialists or Other Care Team Provider: yes, cardiology    Education and Discussions with Family / Patient: Updated  (daughter) via phone. Discussed at length regarding diagnosis, prognosis, current treatment plan. Confirmed advanced directives and updated CODE STATUS    Time Spent for Care: 45 minutes. More than 50% of total time spent on counseling and coordination of care as described above. Current Length of Stay: 1 day(s)  Current Patient Status: Inpatient   Certification Statement: The patient will continue to require additional inpatient hospital stay due to sepsis  Discharge Plan: Anticipate discharge in 48-72 hrs to rehab facility. Code Status: Level 3 - DNAR and DNI    Subjective:   Sitting up in bed, sleeping, arouses easily  Does not appear to be in distress  Appears to follow simple commands    Objective:     Vitals:   Temp (24hrs), Av.9 °F (37.2 °C), Min:97 °F (36.1 °C), Max:101.4 °F (38.6 °C)    Temp:  [97 °F (36.1 °C)-101.4 °F (38.6 °C)] 97.4 °F (36.3 °C)  HR:  [] 90  Resp:  [21-26] 22  BP: ()/(55-76) 123/76  SpO2:  [92 %-99 %] 97 %  Body mass index is 18.44 kg/m². Input and Output Summary (last 24 hours):      Intake/Output Summary (Last 24 hours) at 10/19/2023 09  Last data filed at 10/19/2023 6586  Gross per 24 hour Intake 1800 ml   Output 100 ml   Net 1700 ml       Physical Exam:   Physical Exam  Constitutional:       General: He is not in acute distress. Comments: Chronically ill, frail   HENT:      Head: Normocephalic and atraumatic. Cardiovascular:      Rate and Rhythm: Normal rate. Rhythm irregular. Heart sounds: Murmur heard. Pulmonary:      Effort: Pulmonary effort is normal. No respiratory distress. Breath sounds: Decreased breath sounds present. No wheezing, rhonchi or rales. Chest:      Chest wall: No tenderness. Abdominal:      General: Bowel sounds are normal. There is no distension. Palpations: Abdomen is soft. Tenderness: There is no abdominal tenderness. There is no guarding or rebound. Genitourinary:     Comments: Calderon catheter clear yellow urine  Musculoskeletal:      Right lower leg: No edema. Left lower leg: No edema. Skin:     General: Skin is warm and dry. Findings: No rash. Neurological:      Mental Status: He is alert. Cranial Nerves: No cranial nerve deficit.          Additional Data:     Labs:  Results from last 7 days   Lab Units 10/19/23  0443 10/18/23  2101   WBC Thousand/uL 7.73 10.36*   HEMOGLOBIN g/dL 11.5* 14.1   HEMATOCRIT % 36.2* 42.8   PLATELETS Thousands/uL 160 192   BANDS PCT %  --  8   LYMPHO PCT %  --  6*   MONO PCT %  --  7   EOS PCT %  --  0     Results from last 7 days   Lab Units 10/19/23  0443 10/18/23  2101   SODIUM mmol/L 146 142   POTASSIUM mmol/L 3.7 4.3   CHLORIDE mmol/L 114* 107   CO2 mmol/L 24 25   BUN mg/dL 33* 37*   CREATININE mg/dL 0.81 1.05   ANION GAP mmol/L 8 10   CALCIUM mg/dL 8.8 9.7   ALBUMIN g/dL  --  3.5   TOTAL BILIRUBIN mg/dL  --  0.90   ALK PHOS U/L  --  61   ALT U/L  --  8   AST U/L  --  15   GLUCOSE RANDOM mg/dL 116 143*     Results from last 7 days   Lab Units 10/18/23  2101   INR  1.30*             Results from last 7 days   Lab Units 10/19/23  0443 10/18/23  2101   LACTIC ACID mmol/L  --  1.6 PROCALCITONIN ng/ml 3.86* 5.54*       Lines/Drains:  Invasive Devices       Peripheral Intravenous Line  Duration             Peripheral IV 10/18/23 Left Antecubital <1 day    Peripheral IV 10/18/23 Right;Ventral (anterior) Hand <1 day              Drain  Duration             Urethral Catheter Non-latex 12 Fr. <1 day                  Urinary Catheter:  Goal for removal:  As per urology           Telemetry:  Telemetry Orders (From admission, onward)               24 Hour Telemetry Monitoring  Continuous x 24 Hours (Telem)        Question:  Reason for 24 Hour Telemetry  Answer:  Arrhythmias requiring acute medical intervention / PPM or ICD malfunction                     Telemetry Reviewed:  Atrial fibrillation, controlled rate  Indication for Continued Telemetry Use: Arrthymias requiring medical therapy             Imaging: Reviewed radiology reports from this admission including: chest xray and CT head    Recent Cultures (last 7 days):         Last 24 Hours Medication List:   Current Facility-Administered Medications   Medication Dose Route Frequency Provider Last Rate    acetaminophen  650 mg Oral Q6H PRN Cuiyin Yurik, CRNP      aspirin  81 mg Oral Daily Cuishaheen Horowitzrik, OSCARNP      donepezil  5 mg Oral HS Cuitachon Lorrik, CRNP      famotidine  20 mg Intravenous Q24H [address] Cuiyin Lorrik, CRNP      glycerin-hypromellose-  1 drop Both Eyes BID Cuiyin Lorrik, CRNP      heparin (porcine)  3-20 Units/kg/hr (Order-Specific) Intravenous Titrated Cuiyin Yurik, CRNP 14 Units/kg/hr (10/19/23 0600)    heparin (porcine)  1,800 Units Intravenous Q6H PRN Cuiyin Yurik, CRNP      heparin (porcine)  3,600 Units Intravenous Q6H PRN Cuiyin Yurik, CRNP      labetalol  100 mg Oral BID Cuiyin Lorrik, CRNP      lactated ringers  75 mL/hr Intravenous Continuous Cuiyin Yurik, CRNP 75 mL/hr (10/19/23 0313)    levalbuterol  0.63 mg Nebulization Q4H PRN Cuiyin Yurik, CRNP      levothyroxine  50 mcg Oral Early Morning CuiNAHUN Lucas nitroglycerin  0.4 mg Sublingual Q5 Min PRN NAHUN Mead      ondansetron  4 mg Intravenous Q6H PRN NAHUN Mead      piperacillin-tazobactam  3.375 g Intravenous Q6H Radha Gillespie, NAHUN      saccharomyces boulardii  250 mg Oral BID Radha Gillespie, NAHUN      senna  1 tablet Oral BID NAHUN Mead          Today, Patient Was Seen By: Sukhdeep Parker MD    ** Please Note: "This note has been constructed using a voice recognition system. Therefore there may be syntax, spelling, and/or grammatical errors.  Please call if you have any questions. "**

## 2023-10-19 NOTE — PLAN OF CARE
Problem: PAIN - ADULT  Goal: Verbalizes/displays adequate comfort level or baseline comfort level  Description: Interventions:  - Encourage patient to monitor pain and request assistance  - Assess pain using appropriate pain scale  - Administer analgesics based on type and severity of pain and evaluate response  - Implement non-pharmacological measures as appropriate and evaluate response  - Consider cultural and social influences on pain and pain management  - Notify physician/advanced practitioner if interventions unsuccessful or patient reports new pain  Outcome: Progressing     Problem: INFECTION - ADULT  Goal: Absence or prevention of progression during hospitalization  Description: INTERVENTIONS:  - Assess and monitor for signs and symptoms of infection  - Monitor lab/diagnostic results  - Monitor all insertion sites, i.e. indwelling lines, tubes, and drains  - Monitor endotracheal if appropriate and nasal secretions for changes in amount and color  - [place] appropriate cooling/warming therapies per order  - Administer medications as ordered  - Instruct and encourage patient and family to use good hand hygiene technique  - Identify and instruct in appropriate isolation precautions for identified infection/condition  Outcome: Progressing  Goal: Absence of fever/infection during neutropenic period  Description: INTERVENTIONS:  - Monitor WBC    Outcome: Progressing     Problem: SAFETY ADULT  Goal: Patient will remain free of falls  Description: INTERVENTIONS:  - Educate patient/family on patient safety including physical limitations  - Instruct patient to call for assistance with activity   - Consult OT/PT to assist with strengthening/mobility   - Keep Call bell within reach  - Keep bed low and locked with side rails adjusted as appropriate  - Keep care items and personal belongings within reach  - Initiate and maintain comfort rounds  - Make Fall Risk Sign visible to staff  - Offer Toileting every 2 Hours, in advance of need  - Initiate/Maintain bed alarm  - Obtain necessary fall risk management equipment: yes  - Apply yellow socks and bracelet for high fall risk patients  - Consider moving patient to room near nurses station  Outcome: Progressing  Goal: Maintain or return to baseline ADL function  Description: INTERVENTIONS:  -  Assess patient's ability to carry out ADLs; assess patient's baseline for ADL function and identify physical deficits which impact ability to perform ADLs (bathing, care of mouth/teeth, toileting, grooming, dressing, etc.)  - Assess/evaluate cause of self-care deficits   - Assess range of motion  - Assess patient's mobility; develop plan if impaired  - Assess patient's need for assistive devices and provide as appropriate  - Encourage maximum independence but intervene and supervise when necessary  - Involve family in performance of ADLs  - Assess for home care needs following discharge   - Consider OT consult to assist with ADL evaluation and planning for discharge  - Provide patient education as appropriate  Outcome: Progressing  Goal: Maintains/Returns to pre admission functional level  Description: INTERVENTIONS:  - Perform BMAT or MOVE assessment daily.   - Set and communicate daily mobility goal to care team and patient/family/caregiver. - Collaborate with rehabilitation services on mobility goals if consulted  - Perform Range of Motion 3 times a day. - Reposition patient every 2 hours.   - Dangle patient 3 times a day  - Stand patient 3 times a day  - Ambulate patient 3 times a day  - Out of bed to chair 3 times a day   - Out of bed for meals 3 times a day  - Out of bed for toileting  - Record patient progress and toleration of activity level   Outcome: Progressing     Problem: DISCHARGE PLANNING  Goal: Discharge to home or other facility with appropriate resources  Description: INTERVENTIONS:  - Identify barriers to discharge w/patient and caregiver  - Arrange for needed discharge resources and transportation as appropriate  - Identify discharge learning needs (meds, wound care, etc.)  - Arrange for interpretive services to assist at discharge as needed  - Refer to Case Management Department for coordinating discharge planning if the patient needs post-hospital services based on physician/advanced practitioner order or complex needs related to functional status, cognitive ability, or social support system  Outcome: Progressing     Problem: Knowledge Deficit  Goal: Patient/family/caregiver demonstrates understanding of disease process, treatment plan, medications, and discharge instructions  Description: Complete learning assessment and assess knowledge base.   Interventions:  - Provide teaching at level of understanding  - Provide teaching via preferred learning methods  Outcome: Progressing     Problem: CARDIOVASCULAR - ADULT  Goal: Maintains optimal cardiac output and hemodynamic stability  Description: INTERVENTIONS:  - Monitor I/O, vital signs and rhythm  - Monitor for S/S and trends of decreased cardiac output  - Administer and titrate ordered vasoactive medications to optimize hemodynamic stability  - Assess quality of pulses, skin color and temperature  - Assess for signs of decreased coronary artery perfusion  - Instruct patient to report change in severity of symptoms  Outcome: Progressing  Goal: Absence of cardiac dysrhythmias or at baseline rhythm  Description: INTERVENTIONS:  - Continuous cardiac monitoring, vital signs, obtain 12 lead EKG if ordered  - Administer antiarrhythmic and heart rate control medications as ordered  - Monitor electrolytes and administer replacement therapy as ordered  Outcome: Progressing     Problem: RESPIRATORY - ADULT  Goal: Achieves optimal ventilation and oxygenation  Description: INTERVENTIONS:  - Assess for changes in respiratory status  - Assess for changes in mentation and behavior  - Position to facilitate oxygenation and minimize respiratory effort  - Oxygen administered by appropriate delivery if ordered  - Initiate smoking cessation education as indicated  - Encourage broncho-pulmonary hygiene including cough, deep breathe, Incentive Spirometry  - Assess the need for suctioning and aspirate as needed  - Assess and instruct to report SOB or any respiratory difficulty  - Respiratory Therapy support as indicated  Outcome: Progressing     Problem: GASTROINTESTINAL - ADULT  Goal: Maintains adequate nutritional intake  Description: INTERVENTIONS:  - Monitor percentage of each meal consumed  - Identify factors contributing to decreased intake, treat as appropriate  - Assist with meals as needed  - Monitor I&O, weight, and lab values if indicated  - Obtain nutrition services referral as needed  Outcome: Progressing  Goal: Oral mucous membranes remain intact  Description: INTERVENTIONS  - Assess oral mucosa and hygiene practices  - Implement preventative oral hygiene regimen  - Implement oral medicated treatments as ordered  - Initiate Nutrition services referral as needed  Outcome: Progressing     Problem: GENITOURINARY - ADULT  Goal: Urinary catheter remains patent  Description: INTERVENTIONS:  - Assess patency of urinary catheter  - If patient has a chronic márquez, consider changing catheter if non-functioning  - Follow guidelines for intermittent irrigation of non-functioning urinary catheter  Outcome: Progressing     Problem: METABOLIC, FLUID AND ELECTROLYTES - ADULT  Goal: Electrolytes maintained within normal limits  Description: INTERVENTIONS:  - Monitor labs and assess patient for signs and symptoms of electrolyte imbalances  - Administer electrolyte replacement as ordered  - Monitor response to electrolyte replacements, including repeat lab results as appropriate  - Instruct patient on fluid and nutrition as appropriate  Outcome: Progressing  Goal: Fluid balance maintained  Description: INTERVENTIONS:  - Monitor labs   - Monitor I/O and WT  - Instruct patient on fluid and nutrition as appropriate  - Assess for signs & symptoms of volume excess or deficit  Outcome: Progressing     Problem: SKIN/TISSUE INTEGRITY - ADULT  Goal: Skin Integrity remains intact(Skin Breakdown Prevention)  Description: Assess:  -Perform Jose assessment every shift   -Clean and moisturize skin every shift  -Inspect skin when repositioning, toileting, and assisting with ADLS  -Assess under medical devices such as Masimo every shift   -Assess extremities for adequate circulation and sensation     Bed Management:  -Have minimal linens on bed & keep smooth, unwrinkled  -Change linens as needed when moist or perspiring  -Avoid sitting or lying in one position for more than 2 hours while in bed  -Keep HOB at 30 degrees     Toileting:  -Offer bedside commode  -Assess for incontinence every shift  -Use incontinent care products after each incontinent episode such as Remedy    Activity:  -Mobilize patient 3 times a day  -Encourage activity and walks on unit  -Encourage or provide ROM exercises   -Turn and reposition patient every 2 Hours  -Use appropriate equipment to lift or move patient in bed  -Instruct/ Assist with weight shifting every 30 minutes  when out of bed in chair  -Consider limitation of chair time 2 hour intervals    Skin Care:  -Avoid use of baby powder, tape, friction and shearing, hot water or constrictive clothing  -Relieve pressure over bony prominences using pillow  -Do not massage red bony areas    Next Steps:  -Teach patient strategies to minimize risks such as weight shifting    -Consider consults to  interdisciplinary teams such as wound care   Outcome: Progressing

## 2023-10-19 NOTE — PLAN OF CARE
Problem: PHYSICAL THERAPY ADULT  Goal: Performs mobility at highest level of function for planned discharge setting. See evaluation for individualized goals. Description: Treatment/Interventions: ADL retraining, Functional transfer training, LE strengthening/ROM, Therapeutic exercise, Endurance training, Bed mobility, Gait training, Spoke to nursing, OT          See flowsheet documentation for full assessment, interventions and recommendations. Note: Prognosis: Fair  Problem List: Decreased strength, Decreased endurance, Impaired balance, Decreased mobility, Pain, Decreased skin integrity, Decreased safety awareness, Impaired judgement, Decreased cognition, Decreased range of motion  Assessment: Patient seen for Physical Therapy evaluation. Patient admitted with Acute respiratory failure with hypoxia ([address]). Comorbidities affecting patient's physical performance include: Afib, CAD, cardiac disease, dementia, and hypertension. Personal factors affecting patient at time of initial evaluation include: lives in 1 story house, inability to ambulate household distances, inability to navigate community distances, inability to navigate level surfaces without external assistance, decreased cognition, decreased initiation and engagement, inability to perform physical activity, limited insight into impairments, inability to perform ADLS, inability to perform IADLS , and inability to live alone. Prior to admission, patient was requiring assist for functional mobility with walker/wheelchair, requiring assist for ADLS, requiring assist for IADLS, and a resident of long term care.   Please find objective findings from Physical Therapy assessment regarding body systems outlined above with impairments and limitations including weakness, decreased ROM, impaired balance, decreased endurance, gait deviations, pain, decreased activity tolerance, decreased functional mobility tolerance, decreased safety awareness, impaired judgement, fall risk, and decreased cognition. The Barthel Index was used as a functional outcome tool presenting with a score of Barthel Index Score: 5 today indicating marked limitations of functional mobility and ADLS. Patient's clinical presentation is currently unstable/unpredictable as seen in patient's presentation of vital sign response, increased fall risk, and decreased endurance. Pt would benefit from continued Physical Therapy treatment to address deficits as defined above and maximize level of functional mobility. As demonstrated by objective findings, the assigned level of complexity for this evaluation is high. The patient's AM[facility] Basic Mobility Inpatient Short Form Raw Score is 8. A Raw score of less than or equal to 16 suggests the patient may benefit from discharge to post-acute rehabilitation services. Please also refer to the recommendation of the Physical Therapist for safe discharge planning. PT Discharge Recommendation: Return to facility with rehabilitation services    See flowsheet documentation for full assessment.

## 2023-10-19 NOTE — H&P
[address]   H&P  Name: Guillermina Easley 81 y.o. male I MRN: [id number]  Unit/Bed#: [address] [place] [address] Date of Admission: 10/18/2023   Date of Service: 10/19/2023 I Hospital Day: 1      Assessment/Plan   * Acute respiratory failure with hypoxia [facility])  Assessment & Plan  Patient presents with hypoxia, SPO2 in 80s on room air since yesterday in nursing home, was placed on O2 5 L in nursing home to get SPO2 above 90%. Per staff, patient has not been eating or drinking much for at least 2 days, having cough with nectar thick liquids, having audible wheezing, not responding as he normally does for at least 2 days. At baseline patient is able to talk a few words, able to feed self, able to follow simple directions and able to do puzzle and stack blocks per staff. Patient has not been able to do all of the above for about one week. Had a chest x-ray yesterday in nursing home which was unremarkable. Patient was given Levaquin 500mg p.o. prior to coming to ED. No fever in nursing home per staff. Chart reviewed. Patient was hospitalized between 6/30-7/4/2023 for severe sepsis secondary to left-sided pneumonia, acute hypoxic respiratory failure, UTI. Patient was treated with cefepime and cefdinir for 7 days total.  Patient was found to have hilar mass on CT without contrast.  Radiology recommended CT chest with contrast, however daughter declined the test as she would not further pursue any treatment if the mass was concerning. Chest x-ray shows possible right lower lobe consolidation, pending final read. Fever 101.4 in ED, patient was in A-fib with RVR intermittently in ED, meets sepsis criteria. Management as below. Procalcitonin 5.54. Patient given Rocephin Zithromax in ED. Will change to Zosyn. N.p.o. except medication  Speech eval  Gentle IV hydration  Xopenex as needed  Continue supplemental oxygen to maintain sats above 90%. Currently on 2 L, satting mid 80s.     Sepsis [facility])  Assessment & Plan  POA, as evidenced by fever, tachycardia, with suspected source of infection right lower lobe pneumonia, likely aspiration in nature. Lactic acid normal  Procalcitonin 5.54   UA shows moderate bacteria, no white count, no nitrite, no leukocytes. Will add urine culture. IV Zosyn as above  Follow-up blood cultures  Follow-up chest x-ray official read  Gender IV hydration for 1 day  Repeat CBC, procalcitonin in the morning    Atrial fibrillation with rapid ventricular response ([address])  Assessment & Plan  In the setting of sepsis. Episodes of A-fib with RVR during hospitalization in June to July 2023 in the setting of severe sepsis per chart review. History of PACs per chart review. Will give Cardizem 15mg IV x1. Continue labetalol  Telemetry  Check 2D echo  Optimize electrolytes  On heparin drip as above  Consult cardiology    Elevated troponin  Assessment & Plan  Troponin 450->385  Initial EKG showed sinus tach with premature supraventricular complexes, rate 122, ST depression in lateral leads. Repeat EKG showed A-fib with RVR, rate 140  Likely type II MI secondary to sepsis and  tachycardia  Trend troponin  Telemetry  Check 2D echo  Patient was started on heparin drip in ED, will continue  Consult cardiology  Check A1c, lipid panel    Urinary retention  Assessment & Plan  Appears to be acute. Calderon inserted in ED, initial urine output 1000 mL. Bladder scan showed 800 mL prior to insertion. Continue Calderon catheter  Patient is on DuoNeb 3 times daily and every 6 hours as needed in nursing home. Will change to Xopenex as needed. Consult urology    Acute encephalopathy  Assessment & Plan  Most likely toxic metabolic encephalopathy due to sepsis. Treat underlying cause  Patient's eyes open, good eye contact, nonverbal, does not follow commands. English speaking at baseline.   Check CT head for completeness  Monitor mental status    CAD (coronary artery disease)  Assessment & Plan  Status post angioplasty in 1999. On baby aspirin labetalol in nursing home. No longer taking statin. Continue baby aspirin labetalol  Check lipid panel as above    Essential hypertension  Assessment & Plan  On lisinopril, labetalol in nursing home. BP was in 200/100s last week per staff. BP soft currently  Hold lisinopril  Continue labetalol  Monitor BP closely    Severe dementia [facility])  Assessment & Plan  Nursing home resident. On puréed, nectar thick liquids in nursing home. Essentially wheelchair-bound. Incontinent of bowel and bladder. Patient talks minimally at baseline, Danish-speaking only. Continue Aricept  Supportive care  Patient is full code on POLST form in nursing home. We will continue full code for now. VTE Prophylaxis: Heparin Drip    Code Status: Full code  POLST: Reviewed POLST form in nursing home with staff on the phone    Anticipated Length of Stay:  Patient will be admitted on an Inpatient basis with an anticipated length of stay of  > 2 midnights. Justification for Hospital Stay: Acute hypoxic respiratory failure, possible pneumonia, A-fib with RVR, elevated troponin, acute urinary retention    Total Time for Visit, including Counseling / Coordination of Care: 45 minutes. Greater than 50% of this total time spent on direct patient counseling and coordination of care. Chief Complaint:   Hypoxia, not eating or drinking much, not responding well    History of Present Illness:    Neri Murrieta is a 81 y.o. male with PMH of severe dementia, dysphagia, hypertension, CAD, PACs, hyperlipidemia who presents with hypoxia, SPO2 in 80s on room air since yesterday in nursing home, was placed on O2 5 L in nursing home to get SPO2 above 90%. Per staff, patient has not been eating or drinking much for at least 2 days, having cough with nectar thick liquids, having audible wheezing, not responding as he normally does for at least 2 days.   At baseline patient is able to talk a few words, able to feed self, able to follow simple directions and able to do puzzle and stack blocks per staff. Patient has not been able to do all of the above for about one week. Had a chest x-ray yesterday in nursing home which was unremarkable. Patient was given Levaquin 500mg p.o. prior to coming to ED for possible unknown infection. No fever in nursing home per staff. Patient is nonverbal on exam, eyes open, has good eye contact , does not follow commands. Patient's primary language is [employer] and [place]. Patient does not respond well even with [place] speaking staff in nursing home per staff. Information obtained from staff in nursing home over the phone. Review of Systems:    Review of Systems   Unable to perform ROS: Dementia (Spoke to staff at nursing home over the phone)   Constitutional:  Positive for appetite change. Respiratory:  Positive for shortness of breath and wheezing. Cough with food   Psychiatric/Behavioral:          Mental status change   All other systems reviewed and are negative. Past Medical and Surgical History:     Past Medical History:   Diagnosis Date    Dementia ([address])     Hypertension        No past surgical history on file. Meds/Allergies:    Prior to Admission medications    Medication Sig Start Date End Date Taking?  Authorizing Provider   ipratropium-albuterol (DUO-NEB) 0.5-2.5 mg/3 mL nebulizer solution Take 3 mL by nebulization TID and every 6 hours prn   Yes Historical Provider, MD   lisinopril (ZESTRIL) 5 mg tablet Take 5 mg by mouth daily   Yes Historical Provider, MD   polyvinyl alcohol (LIQUIFILM TEARS) 1.4 % ophthalmic solution Administer 1 drop to both eyes 2 (two) times a day   Yes Historical Provider, MD   aspirin (ECOTRIN LOW STRENGTH) 81 mg EC tablet Take 81 mg by mouth daily Give 1 tablet by mouth one time a day for CAD    Historical Provider, MD   divalproex sodium (DEPAKOTE) 250 mg DR tablet Take 250 mg by mouth every 12 (twelve) hours Give 1 tablet by mouth two times a day for mood regulation. Patient not taking: Reported on 10/18/2023    Historical Provider, MD   donepezil (ARICEPT) 5 mg tablet Take 5 mg by mouth daily at bedtime Give 1 tablet by mouth one time a day for dementia    Historical Provider, MD   labetalol (NORMODYNE) 100 mg tablet Take 100 mg by mouth 2 (two) times a day Give 1 tablet by mouth two times a day for HTN hold for systolic BP less than 322 and pulse less than 60    Historical Provider, MD   levothyroxine 50 mcg tablet Take 50 mcg by mouth daily Give 1 tablet by mouth in the morning for hypothyroidism    Historical Provider, MD   nitroglycerin (NITROSTAT) 0.4 mg SL tablet Place 0.4 mg under the tongue every 5 (five) minutes as needed for chest pain    Historical Provider, MD   QUEtiapine (SEROquel) 25 mg tablet Take 25 mg by mouth daily at bedtime Give 1 tablet by mouth at bedtime for psychosis  Patient not taking: Reported on 10/18/2023    Historical Provider, MD   senna (SENOKOT) 8.6 MG tablet Take 1 tablet by mouth 2 (two) times a day Give 1 tablet by mouth two times a day for constipation    Historical Provider, MD   simvastatin (ZOCOR) 40 mg tablet Take 40 mg by mouth daily at bedtime Give 1 tablet by mouth one time a day for hyperlipidemia  Patient not taking: Reported on 10/18/2023    Historical Provider, MD     I have reveiwed home medications using records provided by [facility]. Allergies:    Allergies   Allergen Reactions    Shellfish-Derived Products - Food Allergy Other (See Comments)     Not able to assess       Social History:     Marital Status: Single   Occupation: Retired  Patient Pre-hospital Living Situation: Nursing resident  Patient Pre-hospital Level of Mobility: Wheelchair-bound  Patient Pre-hospital Diet Restrictions: Purée with nectar thick liquids  Substance Use History:   Social History     Substance and Sexual Activity   Alcohol Use Not Currently     Social History     Tobacco Use   Smoking Status Unknown   Smokeless Tobacco Not on file     Social History     Substance and Sexual Activity   Drug Use Not Currently       Family History:    non-contributory    Physical Exam:     Vitals:   Blood Pressure: 99/63 (10/19/23 0357)  Pulse: 102 (10/19/23 0357)  Temperature: (!) 97.4 °F (36.3 °C) (10/19/23 0323)  Temp Source: Rectal (10/19/23 0018)  Respirations: 22 (10/19/23 0323)  Height: 5' 7" (170.2 cm) (10/19/23 0318)  Weight - Scale: 53.4 kg (117 lb 12.8 oz) (10/19/23 0318)  SpO2: 96 % (10/19/23 0357)    Physical Exam  Vitals and nursing note reviewed. Constitutional:       Appearance: He is well-developed. HENT:      Head: Normocephalic and atraumatic. Neck:      Thyroid: No thyromegaly. Vascular: No JVD. Trachea: No tracheal deviation. Cardiovascular:      Rate and Rhythm: Tachycardia present. Rhythm irregular. Heart sounds: Normal heart sounds. Pulmonary:      Effort: Pulmonary effort is normal. No respiratory distress. Breath sounds: No wheezing or rales. Comments: Unable to auscultate due to noncompliance. No audible wheezing on exam.  On oxygen 2 L, satting mid 90s currently. Abdominal:      General: Bowel sounds are normal. There is no distension. Palpations: Abdomen is soft. Tenderness: There is no abdominal tenderness. There is no guarding. Genitourinary:     Comments: Calderon catheter draining clear concentrated urine. Inserted in ED. Musculoskeletal:      Cervical back: Neck supple. Right lower leg: No edema. Left lower leg: No edema. Skin:     General: Skin is warm and dry. Neurological:      General: No focal deficit present. Mental Status: He is alert. Comments: Patient awake alert, eyes open with good eye contact, nonverbal, does not follow commands. Psychiatric:         Mood and Affect: Mood normal.         Additional Data:     Lab Results: I have personally reviewed pertinent reports.       Results from last 7 days   Lab Units 10/19/23  0443 10/18/23  2101   WBC Thousand/uL 7.73 10.36*   HEMOGLOBIN g/dL 11.5* 14.1   HEMATOCRIT % 36.2* 42.8   PLATELETS Thousands/uL 160 192   LYMPHO PCT %  --  6*   MONO PCT %  --  7   EOS PCT %  --  0     Results from last 7 days   Lab Units 10/19/23  0443 10/18/23  2101   POTASSIUM mmol/L 3.7 4.3   CHLORIDE mmol/L 114* 107   CO2 mmol/L 24 25   BUN mg/dL 33* 37*   CREATININE mg/dL 0.81 1.05   CALCIUM mg/dL 8.8 9.7   ALK PHOS U/L  --  61   ALT U/L  --  8   AST U/L  --  15     Results from last 7 days   Lab Units 10/18/23  2101   INR  1.30*       Imaging: I have personally reviewed pertinent reports. No results found. EKG, Pathology, and Other Studies Reviewed on Admission:   EKG: As above    [employer] Records Reviewed: Yes     ** Please Note: Dragon 360 Dictation voice to text software may have been used in the creation of this document.  **

## 2023-10-19 NOTE — ASSESSMENT & PLAN NOTE
Nursing home resident. On puréed, nectar thick liquids in nursing home. Essentially wheelchair-bound. Incontinent of bowel and bladder. Patient talks minimally at baseline, Hebrew-speaking only. Continue Aricept  Supportive care  Overall guarded prognosis, discussed with daughter over the phone.   Advanced directives verified, patient is DNR/DNI

## 2023-10-19 NOTE — ASSESSMENT & PLAN NOTE
Appears to be acute. Similar history in the past  Márquez inserted in ED, initial urine output 1000 mL. Bladder scan showed 800 mL prior to insertion. Continue Márquez catheter  Patient is on DuoNeb 3 times daily and every 6 hours as needed in nursing home. Will change to Xopenex as needed.   Urology consultation appreciated - márquez draining to bedside bag

## 2023-10-19 NOTE — CONSULTS
Consultation - Cardiology   Jorge A Orr 81 y.o. male MRN: [id number]  Unit/Bed#: [address] [place] [address] Encounter: [id number]    Assessment/Plan     Assessment:  1. Acute respiratory failure with hypoxia question aspiration pneumonia  2. Sepsis  3. Paroxysmal atrial fibrillation  4. Nonischemic myocardial injury secondary to sepsis, tachycardia and ST depression seen concerning for subendocardial injury  5. Coronary artery disease  6. Hypertension  7. Dementia      Plan:  Patient is been admitted to the hospital service  1. Patient unable to provide any meaningful information, information obtained from chart and old records. 2.  We will discontinue labetalol and transition patient to Lopressor 25 mg twice daily and titrate as needed    3.  2D echocardiogram ordered per primary team is pending    4. Speech eval with some concern for possible aspiration with liquids recommendation is for puréed thick. 5.  We will continue IV heparin at this time until we were able to discuss with family role of anticoagulation with his paroxysmal atrial fibrillation. [id number] score = 3, or 3.2% risk of stroke per year    6. Antibiotics and fluids per primary team    7. Further orders as patient condition and testing warrant      History of Present Illness   Physician Requesting Consult: Nirmala Thibodeaux MD  Reason for Consult / Principal Problem: Paroxysmal atrial fibrillation in the setting of sepsis      HPI: Jorge A Orr is a 81y.o. year old male who presented to the emergency room last evening from the [address] [place] [address] in [facility] secondary to a G, change in mental status, anorexia and fever. Family noted that father had been at his baseline days ago but since that time he has declined. He has been having difficulty swallowing and therefore is not eating. They also noted he was not as interactive with the family. She also stated at the center he had had some elevation of his blood pressure.   Chest x-ray was concerning for slight increase of opacity at the left base questioning recurrent pneumonia. Patient recently had been admitted to the hospital with left lower lobe pneumonia in July 2023. During that time, he apparently also had had episodes of rapid atrial fibrillation. Other labs performed in the emergency room, high-sensitivity troponins were 450, 385, 280 and 225. Twelve-lead EKG demonstrated rapid atrial fibrillation at a rate of 140. There also was ST depression, consider subendocardial injury. Procalcitonin is also elevated. Patient's past history is for coronary artery disease with PCI in 1999, hypertension, dyslipidemia and history of PACs. He previously was followed by [facility] cardiology in [facility]. In 2018 patient had a MICAELA due to strep bacteremia which noted a normal EF of 60 to 65%. During his last admission conversation with family noted that they did not wish any aggressive treatments. I did attempt to reach his daughter who is listed as his contact Roseline Tejada at [phone] without success. I was unable to leave message. Inpatient consult to Cardiology  Consult performed by: NAHUN Vasquez  Consult ordered by: NAHUN Drew          Review of Systems   Unable to perform ROS: Dementia       Historical Information   Past Medical History:   Diagnosis Date    Dementia ([address])     Hypertension      No past surgical history on file. Social History     Substance and Sexual Activity   Alcohol Use Not Currently     Social History     Substance and Sexual Activity   Drug Use Not Currently     E-Cigarette/Vaping    E-Cigarette Use Never User      E-Cigarette/Vaping Substances     Social History     Tobacco Use   Smoking Status Unknown   Smokeless Tobacco Not on file     Family History: No family history on file.     Meds/Allergies   all current active meds have been reviewed, current meds:   Current Facility-Administered Medications   Medication Dose Route Frequency acetaminophen (TYLENOL) tablet 650 mg  650 mg Oral Q6H PRN    aspirin chewable tablet 81 mg  81 mg Oral Daily    donepezil (ARICEPT) tablet 5 mg  5 mg Oral HS    Famotidine (PF) (PEPCID) injection 20 mg  20 mg Intravenous Q24H [address]    glycerin-hypromellose- (ARTIFICIAL TEARS) ophthalmic solution 1 drop  1 drop Both Eyes BID    heparin (porcine) 25,000 units in 0.45% NaCl 250 mL infusion (premix)  3-20 Units/kg/hr (Order-Specific) Intravenous Titrated    heparin (porcine) injection 1,800 Units  1,800 Units Intravenous Q6H PRN    heparin (porcine) injection 3,600 Units  3,600 Units Intravenous Q6H PRN    labetalol (NORMODYNE) tablet 100 mg  100 mg Oral BID    lactated ringers infusion  75 mL/hr Intravenous Continuous    levalbuterol (XOPENEX) inhalation solution 0.63 mg  0.63 mg Nebulization Q4H PRN    levothyroxine tablet 50 mcg  50 mcg Oral Early Morning    nitroglycerin (NITROSTAT) SL tablet 0.4 mg  0.4 mg Sublingual Q5 Min PRN    ondansetron (ZOFRAN) injection 4 mg  4 mg Intravenous Q6H PRN    piperacillin-tazobactam (ZOSYN) IVPB 3.375 g  3.375 g Intravenous Q6H    saccharomyces boulardii (FLORASTOR) capsule 250 mg  250 mg Oral BID    senna (SENOKOT) tablet 8.6 mg  1 tablet Oral BID   , and PTA meds:   Prior to Admission Medications   Prescriptions Last Dose Informant Patient Reported? Taking? QUEtiapine (SEROquel) 25 mg tablet Not Taking Outside Facility (Specify) Yes No   Sig: Take 25 mg by mouth daily at bedtime Give 1 tablet by mouth at bedtime for psychosis   Patient not taking: Reported on 10/18/2023   aspirin (ECOTRIN LOW STRENGTH) 81 mg EC tablet Unknown Outside Facility (Specify) Yes No   Sig: Take 81 mg by mouth daily Give 1 tablet by mouth one time a day for CAD   divalproex sodium (DEPAKOTE) 250 mg DR tablet Not Taking Outside Facility (Specify) Yes No   Sig: Take 250 mg by mouth every 12 (twelve) hours Give 1 tablet by mouth two times a day for mood regulation.    Patient not taking: Reported on 10/18/2023   donepezil (ARICEPT) 5 mg tablet Unknown Outside Facility (Specify) Yes No   Sig: Take 5 mg by mouth daily at bedtime Give 1 tablet by mouth one time a day for dementia   ipratropium-albuterol (DUO-NEB) 0.5-2.5 mg/3 mL nebulizer solution Unknown  Yes No   Sig: Take 3 mL by nebulization TID and every 6 hours prn   labetalol (NORMODYNE) 100 mg tablet Unknown Outside Facility (Specify) Yes No   Sig: Take 100 mg by mouth 2 (two) times a day Give 1 tablet by mouth two times a day for HTN hold for systolic BP less than 165 and pulse less than 60   levothyroxine 50 mcg tablet Unknown Outside Facility (Specify) Yes No   Sig: Take 50 mcg by mouth daily Give 1 tablet by mouth in the morning for hypothyroidism   lisinopril (ZESTRIL) 5 mg tablet Unknown  Yes No   Sig: Take 5 mg by mouth daily   nitroglycerin (NITROSTAT) 0.4 mg SL tablet Unknown  Yes No   Sig: Place 0.4 mg under the tongue every 5 (five) minutes as needed for chest pain   polyvinyl alcohol (LIQUIFILM TEARS) 1.4 % ophthalmic solution Unknown  Yes No   Sig: Administer 1 drop to both eyes 2 (two) times a day   senna (SENOKOT) 8.6 MG tablet Unknown Outside Facility (Specify) Yes No   Sig: Take 1 tablet by mouth 2 (two) times a day Give 1 tablet by mouth two times a day for constipation   simvastatin (ZOCOR) 40 mg tablet Not Taking Outside Facility (Specify) Yes No   Sig: Take 40 mg by mouth daily at bedtime Give 1 tablet by mouth one time a day for hyperlipidemia   Patient not taking: Reported on 10/18/2023      Facility-Administered Medications: None     Allergies   Allergen Reactions    Shellfish-Derived Products - Food Allergy Other (See Comments)     Not able to assess       Objective   Vitals: Blood pressure 123/76, pulse 90, temperature (!) 97.4 °F (36.3 °C), resp. rate 22, height 5' 7" (1.702 m), weight 53.4 kg (117 lb 11.6 oz), SpO2 97 %.   Orthostatic Blood Pressures      Flowsheet Row Most Recent Value   Blood Pressure 123/76 filed at 10/19/2023 0743              Intake/Output Summary (Last 24 hours) at 10/19/2023 1102  Last data filed at 10/19/2023 0447  Gross per 24 hour   Intake 1800 ml   Output 100 ml   Net 1700 ml       Invasive Devices       Peripheral Intravenous Line  Duration             Peripheral IV 10/18/23 Left Antecubital <1 day    Peripheral IV 10/18/23 Right;Ventral (anterior) Hand <1 day              Drain  Duration             Urethral Catheter Non-latex 12 Fr. <1 day                    Physical Exam  Vitals and nursing note reviewed. Constitutional:       Appearance: Normal appearance. He is underweight. HENT:      Right Ear: External ear normal.      Left Ear: External ear normal.   Eyes:      General: No scleral icterus. Right eye: No discharge. Left eye: No discharge. Cardiovascular:      Rate and Rhythm: Normal rate and regular rhythm. Pulses: Normal pulses. Pulmonary:      Effort: No accessory muscle usage or respiratory distress. Breath sounds: Examination of the right-lower field reveals decreased breath sounds and rales. Examination of the left-lower field reveals decreased breath sounds. Decreased breath sounds and rales present. Abdominal:      General: Bowel sounds are normal. There is no distension. Palpations: Abdomen is soft. Musculoskeletal:      Right lower leg: No edema. Left lower leg: No edema. Skin:     General: Skin is warm. Capillary Refill: Capillary refill takes less than 2 seconds. Neurological:      General: No focal deficit present. Mental Status: He is alert. Mental status is at baseline. Lab Results: I have personally reviewed pertinent lab results.     CBC with diff:   Results from last 7 days   Lab Units 10/19/23  0443   WBC Thousand/uL 7.73   RBC Million/uL 3.97   HEMOGLOBIN g/dL 11.5*   HEMATOCRIT % 36.2*   MCV fL 91   MCH pg 29.0   MCHC g/dL 31.8   RDW % 14.8   MPV fL 11.6   PLATELETS Thousands/uL 160     CMP:   Results from last 7 days   Lab Units 10/19/23  0443 10/18/23  2101   SODIUM mmol/L 146 142   CHLORIDE mmol/L 114* 107   CO2 mmol/L 24 25   BUN mg/dL 33* 37*   CREATININE mg/dL 0.81 1.05   CALCIUM mg/dL 8.8 9.7   AST U/L  --  15   ALT U/L  --  8   ALK PHOS U/L  --  61   EGFR ml/min/1.73sq m 82 65     HS Troponin:   0   Lab Value Date/Time    HSTNI 225 (H) 10/19/2023 0443    HSTNI0 450 (H) 10/18/2023 2101    HSTNI2 385 (H) 10/18/2023 2250    HSTNI4 280 (H) 10/19/2023 0116     BNP:   Results from last 7 days   Lab Units 10/19/23  0443   POTASSIUM mmol/L 3.7   CHLORIDE mmol/L 114*   CO2 mmol/L 24   BUN mg/dL 33*   CREATININE mg/dL 0.81   CALCIUM mg/dL 8.8   EGFR ml/min/1.73sq m 82     Coags:   Results from last 7 days   Lab Units 10/19/23  0443 10/18/23  2101   PTT seconds 50* 33   INR   --  1.30*     TSH:   Results from last 7 days   Lab Units 10/19/23  0443   TSH 3RD GENERATON uIU/mL 0.573     Magnesium:   Results from last 7 days   Lab Units 10/19/23  0443   MAGNESIUM mg/dL 2.2     Lipid Profile:   Results from last 7 days   Lab Units 10/19/23  0443   HDL mg/dL 19*   LDL CALC mg/dL 72   TRIGLYCERIDES mg/dL 96     Imaging: I have personally reviewed pertinent reports. EKG: Twelve-lead EKG on admission demonstrated atrial fibrillation with a rapid ventricular response. Telemetry reviewed and noted approximately 0405 and converted back to sinus rhythm and has been maintaining sinus rhythm on monitor.   VTE Prophylaxis: Heparin    Code Status: Level 1 - Full Code  Advance Directive and Living Will:      Power of :    POLST:      [facility]  Cardiology

## 2023-10-19 NOTE — ASSESSMENT & PLAN NOTE
Most likely toxic metabolic encephalopathy due to sepsis. CT head without any acute overload, evidence of prior CVA noted  Treat underlying cause  Patient's eyes open, good eye contact, nonverbal, following simple commands. Ghanaian speaking at baseline.   Monitor mental status

## 2023-10-19 NOTE — ASSESSMENT & PLAN NOTE
In the setting of sepsis. Episodes of A-fib with RVR during hospitalization in June to July 2023 in the setting of severe sepsis per chart review. History of PACs per chart review. Rate controlled currently  Currently on metoprolol  Telemetry  Echo - EF of 60% with no wall motion abnormality   Optimize electrolytes  On heparin drip as above  Cardiology following, input appreciated  Discussed with family regarding risk and benefits of long-term anticoagulation. No history of bleeding in the past.  History of fall previously but currently patient is bedbound.   Continue anticoagulation at present for stroke prevention

## 2023-10-19 NOTE — ASSESSMENT & PLAN NOTE
POA, as evidenced by fever, tachycardia, with suspected source of infection right lower lobe pneumonia, likely aspiration in nature. Remains afebrile, hemodynamically stable. Lactic acid normal  Procalcitonin 5.54 initially, downtrending  UA shows moderate bacteria, no white count, no nitrite, no leukocytes.    Chest x-ray with evidence of pneumonia, left base  Continue IV Zosyn as above  Follow-up blood cultures, urine cultures  Gender IV hydration for 1 day  Repeat CBC, procalcitonin pending

## 2023-10-19 NOTE — QUICK NOTE
Progress Note - Triage Assessment   Franci Phillpis 81 y.o. male MRN: [id number]    Date Called: 10/18/23  Room#: ED CT1  Time Evaluated: 4876  Person requesting evaluation: Dr. Waldo Lowery to ED to evaluate patient for possible admission to Critical Care Service, chart reviewed and patient evaluated. Patient presented from NH after several days of not eating/drinking. He has past medical history of dementia, non-verbal at baseline. Upon arrival to [facility] ED he was found to be febrile with temperature of 101.4, heart rate in 110-130's. Labs were significant for PCT of 5.5, and WBC of 10.4. CXR with RLL consolidations. Patient was administered 500 ml bolus, aizthro/ceftriaxone for possible pneumonia. After discussion with daughter, she explains her Dad was having difficulty eating and drinking last week, coughing with every meal. Her and her siblings also agree patient should be DNR/DNI and do not want any aggressive measures (I.e no CVC placement, etc). Case discussed with Critical Care attending Dr. Claribel Blank and after review it was felt the patient is appropriate for admission to a general medical floor. Recommend additional fluids and fever control. These recommendations were discussed with ED attending Dr. Sean Valdivia          Triage Assessment:     Patient appropriate to be admitted to [facility] telemetry level of care.     If any questions or concerns please call the critical care team.

## 2023-10-19 NOTE — ASSESSMENT & PLAN NOTE
On lisinopril, labetalol in nursing home. BP was in 200/100s last week per staff.   Currently blood pressure stable  Hold lisinopril  Labetalol was changed to metoprolol  Monitor BP closely

## 2023-10-19 NOTE — ASSESSMENT & PLAN NOTE
Patient presents with hypoxia, SPO2 in 80s on room air since yesterday in nursing home, was placed on O2 5 L in nursing home to get SPO2 above 90%. Per staff, patient has not been eating or drinking much for at least 2 days, having cough with nectar thick liquids, having audible wheezing, not responding as he normally does for at least 2 days. At baseline patient is able to talk a few words, able to feed self, able to follow simple directions and able to do puzzle and stack blocks per staff. Patient has not been able to do all of the above for about one week. Had a chest x-ray yesterday in nursing home which was unremarkable. Patient was given Levaquin 500mg p.o. prior to coming to ED. No fever in nursing home per staff. Chart reviewed. Patient was hospitalized between 6/30-7/4/2023 for severe sepsis secondary to left-sided pneumonia, acute hypoxic respiratory failure, UTI. Patient was treated with cefepime and cefdinir for 7 days total.  Patient was found to have hilar mass on CT without contrast.  Radiology recommended CT chest with contrast, however daughter declined the test as she would not further pursue any treatment if the mass was concerning. Chest x-ray shows possible right lower lobe consolidation, pending final read. Fever 101.4 in ED, patient was in A-fib with RVR intermittently in ED, meets sepsis criteria. Management as below. Procalcitonin 5.54. Patient given Rocephin Zithromax in ED. Will change to Zosyn. N.p.o. except medication  Speech eval  Gentle IV hydration  Xopenex as needed  Continue supplemental oxygen to maintain sats above 90%. Currently on 2 L, satting mid 80s.

## 2023-10-19 NOTE — ASSESSMENT & PLAN NOTE
Nursing home resident. On puréed, nectar thick liquids in nursing home. Essentially wheelchair-bound. Incontinent of bowel and bladder. Patient talks minimally at baseline, Pakistani-speaking only. Continue Aricept  Supportive care  Patient is full code on POLST form in nursing home. We will continue full code for now.

## 2023-10-19 NOTE — ASSESSMENT & PLAN NOTE
Troponin 450->385  Initial EKG showed sinus tach with premature supraventricular complexes, rate 122, ST depression in lateral leads.   Repeat EKG showed A-fib with RVR, rate 140  Likely type II MI secondary to sepsis and  tachycardia  Trend troponin  Telemetry  Check 2D echo  Patient was started on heparin drip in ED, will continue  Consult cardiology  Check A1c, lipid panel

## 2023-10-19 NOTE — PHYSICAL THERAPY NOTE
PT EVALUATION       10/19/23 0919   PT Last Visit   PT Visit Date 10/19/23   Note Type   Note type Evaluation   Pain Assessment   Pain Assessment Tool FLACC   Pain Rating: FLACC (Rest) - Face 0   Pain Rating: FLACC (Rest) - Legs 0   Pain Rating: FLACC (Rest) - Activity 0   Pain Rating: FLACC (Rest) - Cry 0   Pain Rating: FLACC (Rest) - Consolability 0   Score: FLACC (Rest) 0   Pain Rating: FLACC (Activity) - Face 0   Pain Rating: FLACC (Activity) - Legs 0   Pain Rating: FLACC (Activity) - Activity 0   Pain Rating: FLACC (Activity) - Cry 0   Pain Rating: FLACC (Activity) - Consolability 0   Score: FLACC (Activity) 0   Restrictions/Precautions   Weight Bearing Precautions Per Order No   Other Precautions Bed Alarm; Chair Alarm; Fall Risk;Pain;Cognitive   Home Living   Type of Home SNF; Other (Comment)  (LTC)   Home Layout One level   Additional Comments Per chart review pt is mostly wheelchair bound at LT ; unable to stand or walk without assistance   Prior Function   Level of [place] Needs assistance with ADLs; Needs assistance with functional mobility; Needs assistance with [address] [place] [address] Rd staff   Receives Help From Family; Other (Comment)  (staff)   IADLs Family/Friend/Other provides transportation; Family/Friend/Other provides meals; Family/Friend/Other provides medication management   Vocational Retired   General   Additional Pertinent History Pt is an 81year-old male who was admitted to the hospital on 10/18/23 due to afib with RVR, possible pneumonia   Family/Caregiver Present No   Cognition   Overall Cognitive Status Impaired   Arousal/Participation Arousable   Orientation Level Unable to assess  (Pt mostly nonverbal throughout session ; intermittent able to head nod to answer questions ; nods head yes to his name)   Following Commands Follows one step commands inconsistently   Subjective   Subjective Mostly nonverbal throughout session ; responds with head nod no when asked if he is in pain/discomfort. RLE Assessment   RLE Assessment   (Difficult to assess due to poor ability to follow commands ; Able to move ankle through approx 1/2 avail ROM, PROM WNL)   LLE Assessment   LLE Assessment   (Difficult to assess due to poor ability to follow commands ; Able to move ankle through approx 1/2 avail ROM, PROM WNL)   Bed Mobility   Rolling R 2  Maximal assistance   Additional items Assist x 1;Verbal cues   Supine to Sit 2  Maximal assistance  (Max A to dependent)   Additional items Assist x 1; Increased time required;LE management   Transfers   Sit to Stand Unable to assess   Stand to Sit Unable to assess   Additional Comments Attemtped STS x 3 trials from EOB ; pt with poor ability to follow directions, leaning posteriorly, not assisting in attempt to stand   Ambulation/Elevation   Gait pattern Not appropriate   Gait Assistance Not tested   Balance   Static Sitting Poor   Dynamic Sitting Poor -   Static Standing Zero   Activity Tolerance   Activity Tolerance Other (Comment)  (limited by cognition)   Nurse Made Aware yes RUDOLPH Vital   Assessment   Prognosis Fair   Problem List Decreased strength;Decreased endurance; Impaired balance;Decreased mobility;Pain;Decreased skin integrity; Decreased safety awareness; Impaired judgement;Decreased cognition;Decreased range of motion   Assessment Patient seen for Physical Therapy evaluation. Patient admitted with Acute respiratory failure with hypoxia ([address]). Comorbidities affecting patient's physical performance include: Afib, CAD, cardiac disease, dementia, and hypertension.   Personal factors affecting patient at time of initial evaluation include: lives in 1 story house, inability to ambulate household distances, inability to navigate community distances, inability to navigate level surfaces without external assistance, decreased cognition, decreased initiation and engagement, inability to perform physical activity, limited insight into impairments, inability to perform ADLS, inability to perform IADLS , and inability to live alone. Prior to admission, patient was requiring assist for functional mobility with walker/wheelchair, requiring assist for ADLS, requiring assist for IADLS, and a resident of long term care. Please find objective findings from Physical Therapy assessment regarding body systems outlined above with impairments and limitations including weakness, decreased ROM, impaired balance, decreased endurance, gait deviations, pain, decreased activity tolerance, decreased functional mobility tolerance, decreased safety awareness, impaired judgement, fall risk, and decreased cognition. The Barthel Index was used as a functional outcome tool presenting with a score of Barthel Index Score: 5 today indicating marked limitations of functional mobility and ADLS. Patient's clinical presentation is currently unstable/unpredictable as seen in patient's presentation of vital sign response, increased fall risk, and decreased endurance. Pt would benefit from continued Physical Therapy treatment to address deficits as defined above and maximize level of functional mobility. As demonstrated by objective findings, the assigned level of complexity for this evaluation is high. The patient's AM-PAC Basic Mobility Inpatient Short Form Raw Score is 8. A Raw score of less than or equal to 16 suggests the patient may benefit from discharge to post-acute rehabilitation services. Please also refer to the recommendation of the Physical Therapist for safe discharge planning. Goals   Patient Goals unable to state due to cognition   STG Expiration Date 10/26/23   Short Term Goal #1 Pt will perform bed mobility with Max A ; Pt will tolerate sitting x 5 minutes with Poor+ sitting balance ;  Pt will perform bed <> chair with Max A ; AMPAC score will improve >10/24 to demonstrate improved functional indepencence   LTG Expiration Date 11/02/23   Long Term Goal #1 Pt will perform bed mobility with Mod A ; Pt will tolerate sitting x 8 minutes with Poor+ sitting balance ; Pt will perform bed <> chair with Mod A ; AMPAC score will improve >12/24 to demonstrate improved functional indepencence   Plan   Treatment/Interventions ADL retraining;Functional transfer training;LE strengthening/ROM; Therapeutic exercise; Endurance training;Bed mobility;Gait training;Spoke to nursing;OT   PT Frequency 2-3x/wk   Discharge Recommendation   PT Discharge Recommendation Return to facility with rehabilitation services   AM-PAC Basic Mobility Inpatient   Turning in Flat Bed Without Bedrails 2   Lying on Back to Sitting on Edge of Flat Bed Without Bedrails 2   Moving Bed to Chair 1   Standing Up From Chair Using Arms 1   Walk in Room 1   Climb 3-5 Stairs With Railing 1   Basic Mobility Inpatient Raw Score 8   Turning Head Towards Sound 2   Follow Simple Instructions 2   Low Function Basic Mobility Raw Score  12   Low Function Basic Mobility Standardized Score  18.33   Highest Level Of Mobility   JH-HLM Goal 3: Sit at edge of bed   JH-HLM Achieved 3: Sit at edge of bed   Barthel Index   Feeding 5   Bathing 0   Grooming Score 0   Dressing Score 0   Bladder Score 0   Bowels Score 0   Toilet Use Score 0   Transfers (Bed/Chair) Score 0   Mobility (Level Surface) Score 0   Stairs Score 0   Barthel Index Score 5   End of Consult   Patient Position at End of Consult Supine;Bed/Chair alarm activated; All needs within reach   [facility] Insurance Number  Anupama Brandt [id number]

## 2023-10-19 NOTE — OCCUPATIONAL THERAPY NOTE
OT EVALUATION       10/19/23 1059   OT Last Visit   OT Visit Date 10/19/23   Note Type   Note type Evaluation   Pain Assessment   Pain Assessment Tool FLACC   Pain Rating: FLACC (Rest) - Face 0   Pain Rating: FLACC (Rest) - Legs 0   Pain Rating: FLACC (Rest) - Activity 0   Pain Rating: FLACC (Rest) - Cry 0   Pain Rating: FLACC (Rest) - Consolability 0   Score: FLACC (Rest) 0   Pain Rating: FLACC (Activity) - Face 0   Pain Rating: FLACC (Activity) - Legs 0   Pain Rating: FLACC (Activity) - Activity 0   Pain Rating: FLACC (Activity) - Cry 0   Pain Rating: FLACC (Activity) - Consolability 0   Score: FLACC (Activity) 0   Restrictions/Precautions   Other Precautions Cognitive; Chair Alarm; Bed Alarm; Fall Risk  (Turkish speaking)   Home Living   Type of Home SNF  (Long Term Care)   Additional Comments Per chart review pt is mostly wheelchair bound at LTC ; unable to stand or walk without assistance   Prior Function   Level of [place] Needs assistance with ADLs; Needs assistance with functional mobility; Needs assistance with [address] [place] [address] staff   Receives Help From Personal care attendant   IADLs Family/Friend/Other provides meals; Family/Friend/Other provides medication management; Family/Friend/Other provides transportation   ADL   Eating Assistance 3  Moderate Assistance   Eating Deficit Beverage management; Thickened liquids  (use of spoon)   Grooming Assistance 2  Maximal Assistance   Grooming Deficit Wash/dry face   UB Bathing Assistance 1  Total Assistance   LB Bathing Assistance 1  Total Assistance   [address]  Total [place] to assess   Toileting Assistance  Unable to assess   Bed Mobility   Supine to Sit 2  Maximal assistance   Additional items Assist x 1   Sit to Supine 2  Maximal assistance   Additional items Assist x 1   Transfers   Sit to Stand Unable to assess   Stand to Sit Unable to assess   Balance   Static Sitting Poor   Dynamic Sitting Poor - Activity Tolerance   Activity Tolerance Patient limited by fatigue  (cognition, lethargy)   RUE Assessment   RUE Assessment   (PROM WFL)   LUE Assessment   LUE Assessment   (PROM WFL)   Cognition   Overall Cognitive Status Impaired   Arousal/Participation Lethargic;Arousable   Attention Difficulty attending to directions   Orientation Level Unable to assess  (non-verbal)   Following Commands Unable to follow one step commands   Assessment   Limitation Decreased ADL status; Decreased UE strength;Decreased Safe judgement during ADL;Decreased endurance;Decreased high-level ADLs; Decreased self-care trans;Decreased cognition;Decreased UE ROM  (decreased balance)   Prognosis Fair   Assessment Patient evaluated by Occupational Therapy. Patient admitted with Acute respiratory failure with hypoxia ([address] The patients occupational profile, medical and therapy history includes a extensive additional review of physical, cognitive, or psychosocial history related to current functional performance. Comorbidities affecting functional mobility and ADLS include: dementia and hypertension. Prior to admission, patient was dependent for mobility, requiring assist for ADLS, requiring assist for IADLS, and a resident of long term care. The evaluation identifies the following performance deficits: weakness, decreased ROM, impaired balance, decreased endurance, increased fall risk, decreased ADLS, decreased IADLS, decreased activity tolerance, decreased safety awareness, impaired judgement, decreased cognition, and decreased strength, that result in activity limitations and/or participation restrictions. This evaluation requires clinical decision making of high complexity, because the patient presents with comorbidites that affect occupational performance and required significant modification of tasks or assistance with consideration of multiple treatment options.   The Barthel Index was used as a functional outcome tool presenting with a score of Barthel Index Score: 0, indicating marked limitations of functional mobility and ADLS. The patient's raw score on the AM-PAC Daily Activity Inpatient Short Form is 8. A raw score of less than 19 suggests the patient may benefit from discharge to post-acute rehabilitation services. Please refer to the recommendation of the Occupational Therapist for safe discharge planning. Patient will benefit from a trial of skilled Occupational Therapy services to address above deficits and facilitate a safe return to prior level of function. Goals   Patient Goals unable to state due to cognitive impairment   STG Time Frame   (1-7 days)   Short Term Goal  Goals established to promote Patient Goals: unable to state due to cognitive impairment:  Eating: min assist; Grooming: mod assist seated; Bathing: max assist;  Patient will increase bed mobility to mod assist in preparation for ADLS and transfers; Patient will tolerate 5 minutes of UE ROM/strengthening to increase general activity tolerance and performance in ADLS/IADLS; Patient will improve functional activity tolerance to 10 minutes of sustained functional tasks to increase participation in basic self-care and decrease assistance level;  Patient will increase static sitting balance to poor+ to improve the ability to sit at edge of bed or on a chair for ADLS. LTG Time Frame   (8-14 days)   Long Term Goal Grooming: min assist seated; Bathing: mod assist;  Patient will increase bed mobility to min assist in preparation for ADLS and transfers; Patient will tolerate 10 minutes of UE ROM/strengthening to increase general activity tolerance and performance in ADLS/IADLS; Patient will improve functional activity tolerance to 20 minutes of sustained functional tasks to increase participation in basic self-care and decrease assistance level;  Patient will increase static sitting balance to fair-to improve the ability to sit at edge of bed or on a chair for ADLS. Pt will score >/= 12/24 on AM-PAC Daily Activity Inpatient scale to promote safe independence with ADLs and functional mobility; Pt will score >/= 30/100 on Barthel Index in order to decrease caregiver assistance needed and increase ability to perform ADLs and functional mobility. Plan   Treatment Interventions ADL retraining;Functional transfer training;UE strengthening/ROM; Endurance training;Patient/family training;Equipment evaluation/education; Activityengagement; Compensatory technique education   Goal Expiration Date 11/02/23   OT Frequency 1-2x/wk   Discharge Recommendation   OT Discharge Recommendation Return to facility with rehabilitation services   [facility] Daily Activity Inpatient   Lower Body Dressing 1   Bathing 1   Toileting 1   Upper Body Dressing 1   Grooming 2   Eating 2   Daily Activity Raw Score 8   Turning Head Towards Sound 3   Follow Simple Instructions 1   Low Function Daily Activity Raw Score 12   Low Function Daily Activity Standardized Score  21.38   AM-[facility] Applied Cognition Inpatient   Following a Speech/Presentation 1   Understanding Ordinary Conversation 2   Taking Medications 1   Remembering Where Things Are Placed or Put Away 1   Remembering List of 4-5 Errands 1   Taking Care of Complicated Tasks 1   Applied Cognition Raw Score 7   Applied Cognition Standardized Score 15.17   Barthel Index   Feeding 0   Bathing 0   Grooming Score 0   Dressing Score 0   Bladder Score 0   Bowels Score 0   Toilet Use Score 0   Transfers (Bed/Chair) Score 0   Mobility (Level Surface) Score 0   Stairs Score 0   Barthel Index Score 0   Licensure   [place] License Number  Mallika Gannon [id number] [facility] OTR/L [id number]

## 2023-10-19 NOTE — PLAN OF CARE
Recommended Diet: puree/level 1 diet and honey thick liquids   Recommended Form of Meds: crushed with puree   Aspiration precautions and swallowing strategies: upright posture, only feed when fully alert, slow rate of feeding, small bites/sips, and no straws to begin  Other Recommendations: Continue frequent oral care, offer [place] when coughing

## 2023-10-19 NOTE — ASSESSMENT & PLAN NOTE
Most likely toxic metabolic encephalopathy due to sepsis. Treat underlying cause  Patient's eyes open, good eye contact, nonverbal, does not follow commands. Somali speaking at baseline.   Monitor mental status

## 2023-10-19 NOTE — ASSESSMENT & PLAN NOTE
Appears to be acute. Calderon inserted in ED, initial urine output 1000 mL. Bladder scan showed 800 mL prior to insertion.   Continue Calderon catheter  Consult urology

## 2023-10-19 NOTE — ASSESSMENT & PLAN NOTE
Troponin 450->385  Initial EKG showed sinus tach with premature supraventricular complexes, rate 122, ST depression in lateral leads.   Repeat EKG showed A-fib with RVR, rate 140  Likely type II MI secondary to sepsis and  tachycardia  Trend troponin  Telemetry  Echo with preserved EF, no wall motion abnormalities   Patient was started on heparin drip in ED, will continue  Consult cardiology-input appreciated  Continue medical management

## 2023-10-19 NOTE — ASSESSMENT & PLAN NOTE
Patient presents with hypoxia, SPO2 in 80s on room air since yesterday in nursing home, was placed on O2 5 L in nursing home to get SPO2 above 90%. Per staff, patient has not been eating or drinking much for at least 2 days, having cough with nectar thick liquids, having audible wheezing, not responding as he normally does for at least 2 days. At baseline patient is able to talk a few words, able to feed self, able to follow simple directions and able to do puzzle and stack blocks per staff. Patient has not been able to do all of the above for about one week. Had a chest x-ray yesterday in nursing home which was unremarkable. Patient was given Levaquin 500mg p.o. prior to coming to ED. No fever in nursing home per staff. Chart reviewed. Patient was hospitalized between 6/30-7/4/2023 for severe sepsis secondary to left-sided pneumonia, acute hypoxic respiratory failure, UTI. Patient was treated with cefepime and cefdinir for 7 days total.  Patient was found to have hilar mass on CT without contrast.  Radiology recommended CT chest with contrast, however daughter declined the test as she would not further pursue any treatment if the mass was concerning. Chest x-ray evidence of increased opacity in the left base  Fever 101.4 in ED, patient was in A-fib with RVR intermittently in ED, meets sepsis criteria. Management as below. Procalcitonin 5.54. Now afebrile, remains hemodynamically stable. Now saturating mid 90s on room air at rest.  Likely secondary to pneumonia and sepsis with encephalopathy  Patient given Rocephin Zithromax in ED. continue Zosyn. Speech eval-recommended purée with honey thick liquid, aspiration precaution  Gentle IV hydration  Xopenex as needed, chest PT as tolerated  Continue supplemental oxygen to maintain sats above 90%.

## 2023-10-19 NOTE — SPEECH THERAPY NOTE
Speech-Language Pathology Bedside Swallow Evaluation      Patient Name: Maria Esther Jung    AEYHN'R Date: 10/19/2023     Problem List  Principal Problem:    Acute respiratory failure with hypoxia ([address])  Active Problems:    Acute encephalopathy    Severe dementia ([address])    Essential hypertension    Sepsis ([address])    Urinary retention    Atrial fibrillation with rapid ventricular response (HCC)    Elevated troponin    CAD (coronary artery disease)      Past Medical History  Past Medical History:   Diagnosis Date    Dementia ([address])     Hypertension        Past Surgical History  No past surgical history on file. Summary   Pt presents with a h/o significant dysphagia with s/s suggestive of/ suspected for significant pharyngeal dysphagia but no s/s aspiration with pureed food or honey thick liquid by tsp or small cup sip (but s/s aspiration with large sips and straw sips of nectar thick liquid). MS is reported still below baseline (pt requiring mod/max stim to remain alert and with only intermittent eye opening. Recommended Diet: puree/level 1 diet and honey thick liquids   Recommended Form of Meds: crushed with puree   Aspiration precautions and swallowing strategies: upright posture, only feed when fully alert, slow rate of feeding, small bites/sips, and no straws to begin  Other Recommendations: Continue frequent oral care, offer [place] when coughing        Current Medical Status  Maria Esther Jung is a 81 y.o. male with PMH of severe dementia, dysphagia, hypertension, CAD, PACs, hyperlipidemia who presents with hypoxia, SPO2 in 80s on room air since yesterday in nursing home, was placed on O2 5 L in nursing home to get SPO2 above 90%. Per staff, patient has not been eating or drinking much for at least 2 days, having cough with nectar thick liquids, having audible wheezing, not responding as he normally does for at least 2 days.   At baseline patient is able to talk a few words, able to feed self, able to follow simple directions and able to do puzzle and stack blocks per staff. Patient has not been able to do all of the above for about one week. Had a chest x-ray yesterday in nursing home which was unremarkable. Patient was given Levaquin 500mg p.o. prior to coming to ED for possible unknown infection. No fever in nursing home per staff. DX: Acute respiratory failure with hypoxia (HCC)  Active Problems    Acute encephalopathy    Severe dementia ([address])    Sepsis ([address])  Pt is NPO pending SLP Swallowing Evaluation. Allergies:  Shellfish-derived Products - Food Allergy     Past medical history:  Please see H&P for details    Special Studies:  CXR:  results pending. CT of head: this am.    Social/Education/Vocational Hx:  Pt lives in SNF/[facility] ( [facility])    Swallow Information   Current Risks for Dysphagia & Aspiration: known history of dementia and dysphagia, recent increase in sxs and AMS  Current Diet: NPO   Baseline Diet: puree/level 1 diet and nectar thick liquids      Baseline Assessment   Behavior/Cognition: waxing and waning arousal level  Speech/Language Status: not able to to follow commands and limited verbal output  Patient Positioning: upright in bed  Pain Status/Interventions/Response to Interventions:  No nonverbal indications of pain. Swallow Mechanism Exam  Labial/Facial: unable to test 2/2 limited command following  Lingual: unable to test 2/2 limited command following  Velum: symmetrical (visualized during cough and with oral suctioning x1)  Mandible: adequate ROM  Dentition: nearly edentulous (1 tooth) and does not have dentures  Vocal quality:breathy and weak   Volitional Cough: unable to initiate volitional cough   Respiratory Status: on 2L O2       Consistencies Assessed and Performance   Consistencies Administered: nectar thick, honey thick, and puree (~4 ozs of each)    Oral Stage:   Bolus formation and transfer were rapid with thick liquids (wfl with puree).  Risk for reduced oral control. Pharyngeal Stage: impaired  Swallow Mechanics:  Swallowing initiation appeared prompt. Laryngeal rise was palpated and judged to be excellent. No coughing, throat clearing, change in vocal quality or respiratory status noted with puree or honey thick liquid by tsp or small cup sip (but s/s aspiration with large sip of HTL x1 and large and straw sips of nectar thick liquid. Esophageal Concerns: unknown    Strategies and Efficacy: appeared to benefit from SMALL bolus size with thick liquids (tsp or small cup sips);  conversely sxs increased with large and straw sips)    Summary and Recommendations (see above)    Results Reviewed with: patient and RN     Treatment Recommended: yes     Frequency of treatment: 3x weekly (or as medically and clinically indicated)    Patient Stated Goal: can not state    Dysphagia LTG  -Patient will demonstrate safe and effective oral intake (without overt s/s significant oral/pharyngeal dysphagia including s/s penetration or aspiration) for the highest appropriate diet level. Short Term Goals:  -Pt will tolerate Dysphagia 1/pureed diet and honey thick liquid (HTL by tsp, cup and straw sips) with no significant s/s oral or pharyngeal dysphagia across 1-3 diagnostic session/s    -Pt will tolerate nectar thick liquid by tsp and cup sips (and finally by straw sips) with no significant s/s oral or pharyngeal dysphagia across 1-3 diagnostic session/s.    -If indicated/desired, pt will comply with a Video/Modified Barium Swallow study for more complete assessment of swallowing anatomy/physiology/aspiration risk     - Patient’s caregivers will demonstrate adherence to recommended diet, as well as application of aspiration precautions and compensatory strategies     Thank you for this referral.  Please do not hesitate to contact me with any questions or concerns.     Sena Ponce [address] [place] [address] [phone]

## 2023-10-19 NOTE — MALNUTRITION/BMI
This medical record reflects one or more clinical indicators suggestive of malnutrition. Malnutrition Findings:   Adult Malnutrition type: Chronic illness  Adult Degree of Malnutrition: Other severe protein calorie malnutrition  Malnutrition Characteristics: Fat loss, Muscle loss    360 Statement: Severe protein calorie malnutrition of chronic illness related to inadequate energy intake as evidenced by hollow orbits, depression at the temples, protruding clavicles. Treated with modified diet and supplements    BMI Findings:  Adult BMI Classifications: Underweight < 18.5     Body mass index is 18.44 kg/m². See Nutrition note dated 10/19/2023 for additional details. Completed nutrition assessment is viewable in the nutrition documentation.

## 2023-10-19 NOTE — ASSESSMENT & PLAN NOTE
On lisinopril, labetalol in nursing home. BP was in 200/100s last week per staff.   BP soft currently  Hold lisinopril  Continue labetalol  Monitor BP closely

## 2023-10-19 NOTE — ASSESSMENT & PLAN NOTE
POA, as evidenced by fever, tachycardia, with suspected source of infection right lower lobe pneumonia, likely aspiration in nature. Lactic acid normal  Procalcitonin 5.54   UA shows moderate bacteria, no white count, no nitrite, no leukocytes. Will add urine culture.   IV Zosyn as above  Follow-up blood cultures  Follow-up chest x-ray official read  Gender IV hydration for 1 day  Repeat CBC, procalcitonin in the morning

## 2023-10-19 NOTE — ASSESSMENT & PLAN NOTE
Status post angioplasty in 1999. On baby aspirin labetalol in nursing home. No longer taking statin.   Continue baby aspirin labetalol  LDL 72

## 2023-10-19 NOTE — PLAN OF CARE
Problem: PAIN - ADULT  Goal: Verbalizes/displays adequate comfort level or baseline comfort level  Description: Interventions:  - Encourage patient to monitor pain and request assistance  - Assess pain using appropriate pain scale  - Administer analgesics based on type and severity of pain and evaluate response  - Implement non-pharmacological measures as appropriate and evaluate response  - Consider cultural and social influences on pain and pain management  - Notify physician/advanced practitioner if interventions unsuccessful or patient reports new pain  Outcome: Progressing     Problem: INFECTION - ADULT  Goal: Absence or prevention of progression during hospitalization  Description: INTERVENTIONS:  - Assess and monitor for signs and symptoms of infection  - Monitor lab/diagnostic results  - Monitor all insertion sites, i.e. indwelling lines, tubes, and drains  - Monitor endotracheal if appropriate and nasal secretions for changes in amount and color  - [place] appropriate cooling/warming therapies per order  - Administer medications as ordered  - Instruct and encourage patient and family to use good hand hygiene technique  - Identify and instruct in appropriate isolation precautions for identified infection/condition  Outcome: Progressing

## 2023-10-19 NOTE — ASSESSMENT & PLAN NOTE
Status post angioplasty in 1999. On baby aspirin labetalol in nursing home. No longer taking statin.   Continue baby aspirin labetalol  Check lipid panel as above

## 2023-10-19 NOTE — CONSULTS
H&P Exam - Urology       Patient: Pola Moritz   : 1940 Sex: male   MRN: [id number]     CSN: [id number]      History of Present Illness   HPI:  Pola Moritz is a 81 y.o. male with history of severe dementia, dysphagia, hypertension, CAD found to be hypoxic and in urinary retention having Calderon catheter placed with over 800 cc of urine drained. Patient resides in nursing home urologic consult called for patient speaks only Korean and when seen in the bedside no family members and somewhat confused entire history taken from recent admitted records        Review of Systems:   Constitutional:  Negative for activity change, fever, chills and diaphoresis. HENT: Negative for hearing loss and trouble swallowing. Eyes: Negative for itching and visual disturbance. Respiratory: Negative for chest tightness and shortness of breath. Cardiovascular: Negative for chest pain, edema. Gastrointestinal: Negative for abdominal distention, na abdominal pain, constipation, diarrhea, Nausea and vomiting. Genitourinary: Negative for decreased urine volume, difficulty urinating, dysuria, enuresis, frequency, hematuria and urgency. Musculoskeletal: Negative for gait problem and myalgias. Neurological: Negative for dizziness and headaches. Hematological: Does not bruise/bleed easily. Historical Information   Past Medical History:   Diagnosis Date    Dementia ([address])     Hypertension      No past surgical history on file. Social History   Social History     Substance and Sexual Activity   Alcohol Use Not Currently     Social History     Substance and Sexual Activity   Drug Use Not Currently     Social History     Tobacco Use   Smoking Status Unknown   Smokeless Tobacco Not on file     Family History: No family history on file.     Meds/Allergies   Medications Prior to Admission   Medication    aspirin (ECOTRIN LOW STRENGTH) 81 mg EC tablet    divalproex sodium (DEPAKOTE) 250 mg DR tablet    donepezil (ARICEPT) 5 mg tablet    ipratropium-albuterol (DUO-NEB) 0.5-2.5 mg/3 mL nebulizer solution    labetalol (NORMODYNE) 100 mg tablet    levothyroxine 50 mcg tablet    lisinopril (ZESTRIL) 5 mg tablet    nitroglycerin (NITROSTAT) 0.4 mg SL tablet    polyvinyl alcohol (LIQUIFILM TEARS) 1.4 % ophthalmic solution    QUEtiapine (SEROquel) 25 mg tablet    senna (SENOKOT) 8.6 MG tablet    simvastatin (ZOCOR) 40 mg tablet     Allergies   Allergen Reactions    Shellfish-Derived Products - Food Allergy Other (See Comments)     Not able to assess       Objective   Vitals: /77   Pulse 91   Temp 97.9 °F (36.6 °C)   Resp 22   Ht 5' 7" (1.702 m)   Wt 53.4 kg (117 lb 11.6 oz)   SpO2 97%   BMI 18.44 kg/m²     Physical Exam:  General Alert awake   Normocephalic atraumatic PERRLA  Lungs clear bilaterally  Cardiac normal S1 normal S2  Abdomen soft, flank pain  2 normal testicles   Rectal exam deferred  Extremities no edema    I/O last 24 hours:   In: 1800 [IV Piggyback:1800]  Out: 100 [Urine:100]    Invasive Devices       Peripheral Intravenous Line  Duration             Peripheral IV 10/18/23 Left Antecubital <1 day    Peripheral IV 10/18/23 Right;Ventral (anterior) Hand <1 day              Drain  Duration             Urethral Catheter Non-latex 12 Fr. <1 day                        Lab Results: CBC:   Lab Results   Component Value Date    WBC 7.73 10/19/2023    HGB 11.5 (L) 10/19/2023    HCT 36.2 (L) 10/19/2023    MCV 91 10/19/2023     10/19/2023    RBC 3.97 10/19/2023    MCH 29.0 10/19/2023    MCHC 31.8 10/19/2023    RDW 14.8 10/19/2023    MPV 11.6 10/19/2023    NRBC 0 04/19/2023     CMP:   Lab Results   Component Value Date     (H) 10/19/2023    CO2 24 10/19/2023    BUN 33 (H) 10/19/2023    CREATININE 0.81 10/19/2023    CALCIUM 8.8 10/19/2023    AST 15 10/18/2023    ALT 8 10/18/2023    ALKPHOS 61 10/18/2023    EGFR 82 10/19/2023     Urinalysis:   Lab Results   Component Value Date    COLORU Yellow 10/18/2023 CLARITYU Clear 10/18/2023    SPECGRAV 1.020 10/18/2023    PHUR 6.0 10/18/2023    LEUKOCYTESUR Negative 10/18/2023    NITRITE Negative 10/18/2023    GLUCOSEU Negative 10/18/2023    KETONESU Negative 10/18/2023    BILIRUBINUR Negative 10/18/2023    BLOODU Negative 10/18/2023     Urine Culture:   Lab Results   Component Value Date    URINECX >100,000 cfu/ml Proteus mirabilis (A) 06/30/2023     PSA: No results found for: "PSA"        Assessment/ Plan:  Urinary retention  Urologic history unknown  Dementia  Continue Calderon catheter drainage  We will attempt voiding trial if patient's dementia lessens  Start tamsulosin 0.4 mg  Need to talk to family about previous urologic history apparently having urinary tract infection few months ago indicative of obstructive symptoms      Zoya Miranda MD

## 2023-10-19 NOTE — ED PROVIDER NOTES
History  Chief Complaint   Patient presents with    Weakness - Generalized     Pt comes in from  [facility] care via EMS- EMS states that complete care states he was febrile and lethargic for the past few days. Family was called and stated that they want him transferred to hospital due to not eating/drinking. [facility] gave 500 mg of levoquin- po. Pt is an 80yo M who presents for fevers. Patient arriving from [facility] care. Patient unable to provide any history. Per EMS, patient has been having fevers and decreased oral intake. Patient was reportedly given single dose of Levaquin prior to transfer. Family reportedly wanted patient sent to the hospital.    Patient's daughter arrived later and was able to provide more history. She states he was at his baseline on Friday but she noted at that time difficulty eating. She states not a decreased appetite but difficulty physically swallowing food. She states that he has been on a thickened diet for a long time but even with that was having difficulties. She states that he also had elevated blood pressure last week and believes he had some alterations to his antihypertensives at that time. Prior to Admission Medications   Prescriptions Last Dose Informant Patient Reported? Taking? QUEtiapine (SEROquel) 25 mg tablet Not Taking Outside Facility (Specify) Yes No   Sig: Take 25 mg by mouth daily at bedtime Give 1 tablet by mouth at bedtime for psychosis   Patient not taking: Reported on 10/18/2023   aspirin (ECOTRIN LOW STRENGTH) 81 mg EC tablet  Outside Facility (Specify) Yes No   Sig: Take 81 mg by mouth daily Give 1 tablet by mouth one time a day for CAD   divalproex sodium (DEPAKOTE) 250 mg DR tablet Not Taking Outside Facility (Specify) Yes No   Sig: Take 250 mg by mouth every 12 (twelve) hours Give 1 tablet by mouth two times a day for mood regulation.    Patient not taking: Reported on 10/18/2023   donepezil (ARICEPT) 5 mg tablet  Outside Facility (Specify) Yes No   Sig: Take 5 mg by mouth daily at bedtime Give 1 tablet by mouth one time a day for dementia   ipratropium-albuterol (DUO-NEB) 0.5-2.5 mg/3 mL nebulizer solution   Yes Yes   Sig: Take 3 mL by nebulization TID and every 6 hours prn   labetalol (NORMODYNE) 100 mg tablet  Outside Facility (Specify) Yes No   Sig: Take 100 mg by mouth 2 (two) times a day Give 1 tablet by mouth two times a day for HTN hold for systolic BP less than 876 and pulse less than 60   levothyroxine 50 mcg tablet  Outside Facility (Specify) Yes No   Sig: Take 50 mcg by mouth daily Give 1 tablet by mouth in the morning for hypothyroidism   lisinopril (ZESTRIL) 5 mg tablet   Yes Yes   Sig: Take 5 mg by mouth daily   nitroglycerin (NITROSTAT) 0.4 mg SL tablet   Yes No   Sig: Place 0.4 mg under the tongue every 5 (five) minutes as needed for chest pain   polyvinyl alcohol (LIQUIFILM TEARS) 1.4 % ophthalmic solution   Yes Yes   Sig: Administer 1 drop to both eyes 2 (two) times a day   senna (SENOKOT) 8.6 MG tablet  Outside Facility (Specify) Yes No   Sig: Take 1 tablet by mouth 2 (two) times a day Give 1 tablet by mouth two times a day for constipation   simvastatin (ZOCOR) 40 mg tablet Not Taking Outside Facility (Specify) Yes No   Sig: Take 40 mg by mouth daily at bedtime Give 1 tablet by mouth one time a day for hyperlipidemia   Patient not taking: Reported on 10/18/2023      Facility-Administered Medications: None       Past Medical History:   Diagnosis Date    Dementia ([address])     Hypertension        No past surgical history on file. No family history on file. I have reviewed and agree with the history as documented.     E-Cigarette/Vaping    E-Cigarette Use Never User      E-Cigarette/Vaping Substances     Social History     Tobacco Use    Smoking status: Unknown   Vaping Use    Vaping Use: Never used   Substance Use Topics    Alcohol use: Not Currently    Drug use: Not Currently       Review of Systems   Unable to perform ROS: Patient nonverbal   Constitutional:  Positive for fever. Physical Exam  Physical Exam  Vitals reviewed. Constitutional:       Appearance: He is well-developed. He is not toxic-appearing or diaphoretic. HENT:      Head: Normocephalic and atraumatic. Right Ear: External ear normal.      Left Ear: External ear normal.      Nose: Nose normal.      Mouth/Throat:      Mouth: Mucous membranes are dry. Pharynx: Oropharynx is clear. Eyes:      Extraocular Movements: Extraocular movements intact. Pupils: Pupils are equal, round, and reactive to light. Cardiovascular:      Rate and Rhythm: Regular rhythm. Tachycardia present. Heart sounds: Normal heart sounds. Pulmonary:      Effort: Pulmonary effort is normal. No respiratory distress. Breath sounds: Normal breath sounds. No stridor. No wheezing. Comments: Wet sounding cough present  Abdominal:      General: There is no distension. Palpations: Abdomen is soft. Tenderness: There is abdominal tenderness (Suprapubic). Musculoskeletal:         General: Normal range of motion. Cervical back: Normal range of motion and neck supple. Right lower leg: No edema. Left lower leg: No edema. Skin:     General: Skin is warm and dry. Capillary Refill: Capillary refill takes less than 2 seconds. Coloration: Skin is not pale. Findings: No erythema or rash. Neurological:      Mental Status: He is alert. Mental status is at baseline.    Psychiatric:      Comments: Unable to assess         Vital Signs  ED Triage Vitals [10/18/23 2042]   Temperature Pulse Respirations Blood Pressure SpO2   (!) 97 °F (36.1 °C) (!) 117 21 115/74 96 %      Temp Source Heart Rate Source Patient Position - Orthostatic VS BP Location FiO2 (%)   Tympanic -- -- -- --      Pain Score       --           Vitals:    10/18/23 2042 10/18/23 2315 10/19/23 0000   BP: 115/74 136/76 106/74   Pulse: (!) 117 (!) 140 (!) 110         Visual Acuity      ED Medications  Medications   heparin (porcine) 25,000 units in 0.45% NaCl 250 mL infusion (premix) (12 Units/kg/hr × 60 kg (Order-Specific) Intravenous New Bag 10/18/23 2246)   heparin (porcine) injection 3,600 Units (has no administration in time range)   heparin (porcine) injection 1,800 Units (has no administration in time range)   acetaminophen (TYLENOL) rectal suppository 650 mg (650 mg Rectal Given 10/18/23 2108)   sodium chloride 0.9 % bolus 500 mL (0 mL Intravenous Stopped 10/18/23 2225)   cefTRIAXone (ROCEPHIN) IVPB (premix in dextrose) 1,000 mg 50 mL (0 mg Intravenous Stopped 10/18/23 2226)   heparin (porcine) injection 3,600 Units (3,600 Units Intravenous Given 10/18/23 2245)   azithromycin (ZITHROMAX) 500 mg in sodium chloride 0.9% 250mL IVPB 500 mg (500 mg Intravenous New Bag 10/18/23 2311)   sodium chloride 0.9 % bolus 1,000 mL (1,000 mL Intravenous New Bag 10/18/23 2311)   diltiazem (CARDIZEM) injection 15 mg (15 mg Intravenous Given 10/18/23 2355)       Diagnostic Studies  Results Reviewed       Procedure Component Value Units Date/Time    APTT six (6) hours after Heparin bolus/drip initiation or dosing change [[id number]]     Lab Status: No result Specimen: Blood     Magnesium [[id number]]     Lab Status: No result Specimen: Blood     HS Troponin I 2hr [[id number]]  (Abnormal) Collected: 10/18/23 2250    Lab Status: Final result Specimen: Blood Updated: 10/18/23 2317     hs TnI 2hr 385 ng/L      Delta 2hr hsTnI -65 ng/L     HS Troponin I 4hr [[id number]]     Lab Status: No result Specimen: Blood     Urine Microscopic [[id number]]  (Abnormal) Collected: 10/18/23 2130    Lab Status: Final result Specimen: Urine, Indwelling Calderon Catheter Updated: 10/18/23 2229     RBC, UA 0-1 /hpf      WBC, UA 1-2 /hpf      Epithelial Cells Occasional /hpf      Bacteria, UA Moderate /hpf      OTHER OBSERVATIONS Transitional Epithelial Cells     MUCUS THREADS Occasional    RBC Morphology Reflex Test [[id number]] Collected: 10/18/23 2101    Lab Status: Final result Specimen: Blood from Arm, Right Updated: 10/18/23 2201    Manual Differential(PHLEBS Do Not Order) [[id number]]  (Abnormal) Collected: 10/18/23 2101    Lab Status: Final result Specimen: Blood from Arm, Right Updated: 10/18/23 2142     Segmented % 79 %      Bands % 8 %      Lymphocytes % 6 %      Monocytes % 7 %      Eosinophils, % 0 %      Basophils % 0 %      Absolute Neutrophils 9.01 Thousand/uL      Lymphocytes Absolute 0.62 Thousand/uL      Monocytes Absolute 0.73 Thousand/uL      Eosinophils Absolute 0.00 Thousand/uL      Basophils Absolute 0.00 Thousand/uL      Total Counted --     Dohle Bodies Present     RBC Morphology Normal     Platelet Estimate Adequate    CBC and differential [[id number]]  (Abnormal) Collected: 10/18/23 2101    Lab Status: Final result Specimen: Blood from Arm, Right Updated: 10/18/23 2142     WBC 10.36 Thousand/uL      RBC 4.80 Million/uL      Hemoglobin 14.1 g/dL      Hematocrit 42.8 %      MCV 89 fL      MCH 29.4 pg      MCHC 32.9 g/dL      RDW 14.9 %      MPV 11.8 fL      Platelets 044 Thousands/uL     Narrative: This is an appended report. These results have been appended to a previously verified report.     Procalcitonin [[id number]]  (Abnormal) Collected: 10/18/23 2101    Lab Status: Final result Specimen: Blood from Arm, Right Updated: 10/18/23 2140     Procalcitonin 5.54 ng/ml     UA w Reflex to Microscopic w Reflex to Culture [[id number]]  (Abnormal) Collected: 10/18/23 2130    Lab Status: Final result Specimen: Urine, Indwelling Calderon Catheter Updated: 10/18/23 2136     Color, UA Yellow     Clarity, UA Clear     Specific Gravity, UA 1.020     pH, UA 6.0     Leukocytes, UA Negative     Nitrite, UA Negative     Protein, UA Trace mg/dl      Glucose, UA Negative mg/dl      Ketones, UA Negative mg/dl      Urobilinogen, UA 0.2 E.U./dl      Bilirubin, UA Negative     Occult Blood, UA Negative    Lactic acid, plasma (w/reflex if result > 2.0) [[id number]]  (Normal) Collected: 10/18/23 2101    Lab Status: Final result Specimen: Blood from Arm, Right Updated: 10/18/23 2134     LACTIC ACID 1.6 mmol/L     Narrative:      Result may be elevated if tourniquet was used during collection.     HS Troponin 0hr (reflex protocol) [[id number]]  (Abnormal) Collected: 10/18/23 2101    Lab Status: Final result Specimen: Blood from Arm, Right Updated: 10/18/23 2134     hs TnI 0hr 450 ng/L     Comprehensive metabolic panel [[id number]]  (Abnormal) Collected: 10/18/23 2101    Lab Status: Final result Specimen: Blood from Arm, Right Updated: 10/18/23 2133     Sodium 142 mmol/L      Potassium 4.3 mmol/L      Chloride 107 mmol/L      CO2 25 mmol/L      ANION GAP 10 mmol/L      BUN 37 mg/dL      Creatinine 1.05 mg/dL      Glucose 143 mg/dL      Calcium 9.7 mg/dL      AST 15 U/L      ALT 8 U/L      Alkaline Phosphatase 61 U/L      Total Protein 6.9 g/dL      Albumin 3.5 g/dL      Total Bilirubin 0.90 mg/dL      eGFR 65 ml/min/1.73sq m     Narrative:      [facility] guidelines for Chronic Kidney Disease (CKD):     Stage 1 with normal or high GFR (GFR > 90 mL/min/1.73 square meters)    Stage 2 Mild CKD (GFR = 60-89 mL/min/1.73 square meters)    Stage 3A Moderate CKD (GFR = 45-59 mL/min/1.73 square meters)    Stage 3B Moderate CKD (GFR = 30-44 mL/min/1.73 square meters)    Stage 4 Severe CKD (GFR = 15-29 mL/min/1.73 square meters)    Stage 5 End Stage CKD (GFR <15 mL/min/1.73 square meters)  Note: GFR calculation is accurate only with a steady state creatinine    APTT [[id number]]  (Normal) Collected: 10/18/23 2101    Lab Status: Final result Specimen: Blood from Arm, Right Updated: 10/18/23 2132     PTT 33 seconds     Protime-INR [[id number]]  (Abnormal) Collected: 10/18/23 2101    Lab Status: Final result Specimen: Blood from Arm, Right Updated: 10/18/23 2132     Protime 16.3 seconds      INR 1.30    Blood culture #1 [[id number]] Collected: 10/18/23 2101    Lab Status: In process Specimen: Blood from Arm, Left Updated: 10/18/23 2110    Blood culture #2 [[id number]] Collected: 10/18/23 2101    Lab Status: In process Specimen: Blood from Arm, Right Updated: 10/18/23 2110    FLU/RSV/COVID - if FLU/RSV clinically relevant [[id number]] Collected: 10/18/23 2102    Lab Status: In process Specimen: Nares from Nose Updated: 10/18/23 2108                   XR chest 1 view portable    (Results Pending)              Procedures  Procedures         ED Course  ED Course as of 10/19/23 0020   Wed Oct 18, 2023   2107 Pt with suprapubic tenderness and >800cc on bladder scan. Will insert márquez. 2115 WBC(!): 10.36  Mildly elevated. Non-diagnostic. 2133 PTT: 33  WNL   2133 POCT INR(!): 1.30   2137 Leukocytes, UA: Negative   2137 Nitrite, UA: Negative  No evidence of infection. 2137 Blood, UA: Negative   2137 Comprehensive metabolic panel(!)  Reviewed and without actionable derangement. 2137 LACTIC ACID: 1.6  WNL   2137 hs TnI 0hr(!): 450  Significantly elevated. No ST changes on EKG. However, rhythm now changed. Will repeat EKG. 2140 Procalcitonin(!): 5.54  Significantly elevated. 2142 Bands Relative: 8  WNL   2149 Repeat EKG with more pronounced lateral depressions. Will plan for ACS hep and make cards aware. 2153 Cards in agreement with ACS heparin but also requested cardizem gtt if BP can tolerate.  systolic. Will hold on cardizem at this time. 2204 XR chest 1 view portable  RLL consolidation likely 2/2 aspiration based on hx.    2204 Daughter at bedside and updated on all results and plan for admission. She is agreeable. 65 Pt spontaneously converting between NSR and afib.    2211 CC contacted via TT for admission. 2225 CC at bedside. 2229 Bacteria, UA(!): Moderate  Abx in process. 2258 CC evaluated and stated okay for floor. Will make medicine aware. 2303 SLIM contacted via TT for admission.     2318 Delta 2hr hsTnI: -65 Medical Decision Making  Pt is a 82yo M who presents with fever. Exam pertinent for fever, tachycardia, and suprapubic tenderness. Differential diagnosis to include but not limited to urinary retention, pneumonia, viral syndrome, sepsis, electrolyte abnormality, dehydration, arrhythmia, UTI. Patient meeting SIRS criteria and therefore will obtain sepsis work-up. Will give small fluid bolus and reassess. Will obtain bladder scan due to suprapubic tenderness and fullness. See ED course for results and details. Plan [facility]. Pt discussed with admitting team and admission orders placed. Pt admitted without incident. I spent 32min of critical care time on this patient. Amount and/or Complexity of Data Reviewed  Labs: ordered. Decision-making details documented in ED Course. Radiology: ordered. Decision-making details documented in ED Course. Risk  OTC drugs. Prescription drug management. Decision regarding hospitalization.              Disposition  Final diagnoses:   Fever   Pneumonia   Elevated troponin   A-fib ([address])   Urinary retention     Time reflects when diagnosis was documented in both MDM as applicable and the Disposition within this note       Time User Action Codes Description Comment    10/18/2023 10:12 PM Ivett Whitmore [R50.9] Fever     10/18/2023 10:12 PM Isabella Hernandez [J18.9] Pneumonia     10/18/2023 10:12 PM Ivett Whitmore [R79.89] Elevated troponin     10/18/2023 10:13 PM Ivett Whitmore [I48.91] A-fib ([address])     10/19/2023 12:02 AM Ramon Gr Add [I48.91] Atrial fibrillation with rapid ventricular response ([address])     10/19/2023 12:15 AM Isabella Hernandez [R33.9] Urinary retention           ED Disposition       ED Disposition   Admit    Condition   Stable    Date/Time   Wed Oct 18, 2023 11:04 PM    Comment   Case was discussed with ANTHONY and the patient's admission status was agreed to be Admission Status: inpatient status to the service of Dr. Ronald Sylvester. Follow-up Information    None         Patient's Medications   Discharge Prescriptions    No medications on file       No discharge procedures on file.     PDMP Review       None            ED Provider  Electronically Signed by             Julieta Garzon MD  10/19/23 0019       Julieta Garzon MD  10/19/23 0020

## 2023-10-19 NOTE — CASE MANAGEMENT
Case Management Assessment & Discharge Planning Note    Patient name Ninoska Correa  Location [address] [facility] [place] [address]-* MRN [id number]  : 1940 Date 10/19/2023       Current Admission Date: 10/18/2023  Current Admission Diagnosis:Acute respiratory failure with hypoxia [facility])   Patient Active Problem List    Diagnosis Date Noted    CAD (coronary artery disease) 10/19/2023    Sepsis ([address]) 10/18/2023    Urinary retention 10/18/2023    Atrial fibrillation with rapid ventricular response ([address]) 10/18/2023    Elevated troponin 10/18/2023    Acute respiratory failure with hypoxia ([address]) 2023    Acute encephalopathy 2023    Severe dementia ([address]) 2023    Essential hypertension 2023    Hilar mass 2023    Goals of care, counseling/discussion 2023      LOS (days): 1  Geometric Mean LOS (GMLOS) (days): 5.10  Days to GMLOS:4.3     OBJECTIVE:    Risk of Unplanned Readmission Score: 21.66       Current admission status: Inpatient  Referral Reason: Other (Discharge planning)    Preferred Pharmacy:   PATIENT/FAMILY REPORTS NO PREFERRED PHARMACY  No address on file    Primary Care Provider: Franklin Hensley DO    Primary Insurance: MEDICARE  Secondary Insurance: [address]     ASSESSMENT:  [address], [address] - Daughter   Primary Phone: [phone] (Mobile)  Home Phone: [phone]                  Readmission Root Cause  30 Day Readmission: No    Patient Information  Admitted from[de-identified] Facility ([facility])  Mental Status: Confused, Alert  During Assessment patient was accompanied by: Not accompanied during assessment  Assessment information provided by[de-identified] Daughter  Primary Caregiver:  Other (Comment)  Caregiver's Name[de-identified] [facility] Relationship to Patient[de-identified] Other (Specify) (SNF)  Caregiver's Telephone Number[de-identified] [phone]  Support Systems: Family members, Daughter  [place] of Residence: [facility] do you live in?: [place]  Type of Current Residence: Facility  In the last 12 months, was there a time when you were not able to pay the mortgage or rent on time?: No  In the last 12 months, how many places have you lived?: 1  In the last 12 months, was there a time when you did not have a steady place to sleep or slept in a shelter (including now)?: No  Homeless/housing insecurity resource given?: N/A  Living Arrangements: Other (Comment) (SNF - [facility])    Activities of Daily Living Prior to Admission  Functional Status: Total dependent  Completes ADLs independently?: No  Level of ADL dependence: Total Dependent  Ambulates independently?: No  Level of ambulatory dependence: Total Dependent  Does patient currently have [address]?: No    Patient Information Continued  Income Source: Pension/skilled nursing  Does patient have prescription coverage?: Yes  Within the past 12 months, you worried that your food would run out before you got the money to buy more.: Never true  Within the past 12 months, the food you bought just didn't last and you didn't have money to get more.: Never true  Food insecurity resource given?: N/A  Does patient receive dialysis treatments?: No    Means of Transportation  Means of Transport to Appts[de-identified] Esperanza Raygoza  In the past 12 months, has lack of transportation kept you from medical appointments or from getting medications?: No  In the past 12 months, has lack of transportation kept you from meetings, work, or from getting things needed for daily living?: No  Was application for public transport provided?: N/A      DISCHARGE DETAILS:    Discharge planning discussed with[de-identified] Jin Mcfarlandith (daughter)  Freedom of Choice: Yes    Comments - Freedom of Choice: PRANAY spoke with daughter Isaac Pace by phone to introduce role of CM, conduct assessment and discuss discharge planning.   Patient is a long-term resident at [facility] and plan is for him to return to the facility when medically cleared. Sandra Choi noted that she has considered palliative/hospice for patient and SW answered questions about both. Sandra Choi is not sure that she is ready to make a decision on hospice right now but feels that decision may be in the near future. SW encouraged Sandra Choi to discuss further with facility when she is ready. SW placed referral to [facility] for SNF return [address] and patient was confirmed as a bed-hold. SW will continue to follow.       CM contacted family/caregiver?: Yes  Were Treatment Team discharge recommendations reviewed with patient/caregiver?: Yes  Did patient/caregiver verbalize understanding of patient care needs?: N/A- going to facility  Were patient/caregiver advised of the risks associated with not following Treatment Team discharge recommendations?: Yes    Contacts  Patient Contacts: Darshana  (daughter)  Relationship to Patient[de-identified] Family  Contact Method: Phone  Phone Number: [phone]  Reason/Outcome: Emergency Contact, Discharge [address] [facility]         Is the patient interested in [facility] at discharge?: No    DME Referral Provided  Referral made for DME?: No    Other Referral/Resources/Interventions Provided:  Interventions: Facility Return, SNF    Would you like to participate in our [address] service program?  : No - Declined    Treatment Team Recommendation: Facility Return, SNF  Discharge Destination Plan[de-identified] Facility Return, SNF  Transport at Discharge : [facility]

## 2023-10-20 LAB
ANION GAP SERPL CALCULATED.3IONS-SCNC: 6 MMOL/L
APTT PPP: 33 SECONDS (ref 23–37)
APTT PPP: 86 SECONDS (ref 23–37)
ATRIAL RATE: 122 BPM
BACTERIA UR CULT: NORMAL
BUN SERPL-MCNC: 24 MG/DL (ref 5–25)
CALCIUM SERPL-MCNC: 9.1 MG/DL (ref 8.4–10.2)
CHLORIDE SERPL-SCNC: 116 MMOL/L (ref 96–108)
CO2 SERPL-SCNC: 27 MMOL/L (ref 21–32)
CREAT SERPL-MCNC: 0.89 MG/DL (ref 0.6–1.3)
ERYTHROCYTE [DISTWIDTH] IN BLOOD BY AUTOMATED COUNT: 14.6 % (ref 11.6–15.1)
GFR SERPL CREATININE-BSD FRML MDRD: 79 ML/MIN/1.73SQ M
GLUCOSE SERPL-MCNC: 142 MG/DL (ref 65–140)
HCT VFR BLD AUTO: 35.5 % (ref 36.5–49.3)
HGB BLD-MCNC: 11.3 G/DL (ref 12–17)
MAGNESIUM SERPL-MCNC: 1.9 MG/DL (ref 1.9–2.7)
MCH RBC QN AUTO: 29 PG (ref 26.8–34.3)
MCHC RBC AUTO-ENTMCNC: 31.8 G/DL (ref 31.4–37.4)
MCV RBC AUTO: 91 FL (ref 82–98)
MRSA NOSE QL CULT: NORMAL
P AXIS: 71 DEGREES
PLATELET # BLD AUTO: 158 THOUSANDS/UL (ref 149–390)
PMV BLD AUTO: 11.2 FL (ref 8.9–12.7)
POTASSIUM SERPL-SCNC: 3.1 MMOL/L (ref 3.5–5.3)
PR INTERVAL: 132 MS
PROCALCITONIN SERPL-MCNC: 1.84 NG/ML
QRS AXIS: -68 DEGREES
QRSD INTERVAL: 82 MS
QT INTERVAL: 326 MS
QTC INTERVAL: 464 MS
RBC # BLD AUTO: 3.89 MILLION/UL (ref 3.88–5.62)
SODIUM SERPL-SCNC: 149 MMOL/L (ref 135–147)
T WAVE AXIS: 84 DEGREES
VENTRICULAR RATE: 122 BPM
WBC # BLD AUTO: 5.33 THOUSAND/UL (ref 4.31–10.16)

## 2023-10-20 PROCEDURE — 99232 SBSQ HOSP IP/OBS MODERATE 35: CPT | Performed by: INTERNAL MEDICINE

## 2023-10-20 PROCEDURE — 92526 ORAL FUNCTION THERAPY: CPT

## 2023-10-20 PROCEDURE — 85730 THROMBOPLASTIN TIME PARTIAL: CPT | Performed by: INTERNAL MEDICINE

## 2023-10-20 PROCEDURE — 93010 ELECTROCARDIOGRAM REPORT: CPT | Performed by: INTERNAL MEDICINE

## 2023-10-20 PROCEDURE — 94640 AIRWAY INHALATION TREATMENT: CPT

## 2023-10-20 PROCEDURE — 85027 COMPLETE CBC AUTOMATED: CPT | Performed by: INTERNAL MEDICINE

## 2023-10-20 PROCEDURE — 94760 N-INVAS EAR/PLS OXIMETRY 1: CPT

## 2023-10-20 PROCEDURE — 80048 BASIC METABOLIC PNL TOTAL CA: CPT | Performed by: INTERNAL MEDICINE

## 2023-10-20 PROCEDURE — 83735 ASSAY OF MAGNESIUM: CPT | Performed by: INTERNAL MEDICINE

## 2023-10-20 PROCEDURE — 84145 PROCALCITONIN (PCT): CPT | Performed by: INTERNAL MEDICINE

## 2023-10-20 RX ORDER — TAMSULOSIN HYDROCHLORIDE 0.4 MG/1
0.4 CAPSULE ORAL
Status: DISCONTINUED | OUTPATIENT
Start: 2023-10-20 | End: 2023-10-24 | Stop reason: HOSPADM

## 2023-10-20 RX ORDER — POTASSIUM CHLORIDE 20 MEQ/1
40 TABLET, EXTENDED RELEASE ORAL 2 TIMES DAILY
Status: COMPLETED | OUTPATIENT
Start: 2023-10-20 | End: 2023-10-20

## 2023-10-20 RX ORDER — LOSARTAN POTASSIUM 50 MG/1
50 TABLET ORAL DAILY
Status: DISCONTINUED | OUTPATIENT
Start: 2023-10-20 | End: 2023-10-24 | Stop reason: HOSPADM

## 2023-10-20 RX ADMIN — METOPROLOL TARTRATE 25 MG: 25 TABLET, FILM COATED ORAL at 08:28

## 2023-10-20 RX ADMIN — DONEPEZIL HYDROCHLORIDE 5 MG: 5 TABLET ORAL at 21:46

## 2023-10-20 RX ADMIN — PIPERACILLIN AND TAZOBACTAM 3.38 G: 36; 4.5 INJECTION, POWDER, FOR SOLUTION INTRAVENOUS at 03:33

## 2023-10-20 RX ADMIN — GLYCERIN, HYPROMELLOSE, POLYETHYLENE GLYCOL 1 DROP: .2; .2; 1 LIQUID OPHTHALMIC at 17:49

## 2023-10-20 RX ADMIN — Medication 250 MG: at 08:28

## 2023-10-20 RX ADMIN — TAMSULOSIN HYDROCHLORIDE 0.4 MG: 0.4 CAPSULE ORAL at 17:49

## 2023-10-20 RX ADMIN — ASPIRIN 81 MG CHEWABLE TABLET 81 MG: 81 TABLET CHEWABLE at 08:28

## 2023-10-20 RX ADMIN — Medication 250 MG: at 17:49

## 2023-10-20 RX ADMIN — GLYCERIN, HYPROMELLOSE, POLYETHYLENE GLYCOL 1 DROP: .2; .2; 1 LIQUID OPHTHALMIC at 08:28

## 2023-10-20 RX ADMIN — FAMOTIDINE 20 MG: 10 INJECTION, SOLUTION INTRAVENOUS at 08:28

## 2023-10-20 RX ADMIN — PIPERACILLIN AND TAZOBACTAM 3.38 G: 36; 4.5 INJECTION, POWDER, FOR SOLUTION INTRAVENOUS at 16:59

## 2023-10-20 RX ADMIN — PIPERACILLIN AND TAZOBACTAM 3.38 G: 36; 4.5 INJECTION, POWDER, FOR SOLUTION INTRAVENOUS at 08:29

## 2023-10-20 RX ADMIN — POTASSIUM CHLORIDE 40 MEQ: 1500 TABLET, EXTENDED RELEASE ORAL at 12:37

## 2023-10-20 RX ADMIN — HEPARIN SODIUM 3600 UNITS: 1000 INJECTION INTRAVENOUS; SUBCUTANEOUS at 03:33

## 2023-10-20 RX ADMIN — SENNOSIDES 8.6 MG: 8.6 TABLET, FILM COATED ORAL at 17:49

## 2023-10-20 RX ADMIN — HEPARIN SODIUM 20 UNITS/KG/HR: 10000 INJECTION, SOLUTION INTRAVENOUS at 04:51

## 2023-10-20 RX ADMIN — APIXABAN 2.5 MG: 2.5 TABLET, FILM COATED ORAL at 12:37

## 2023-10-20 RX ADMIN — LEVOTHYROXINE SODIUM 50 MCG: 50 TABLET ORAL at 05:17

## 2023-10-20 RX ADMIN — PIPERACILLIN AND TAZOBACTAM 3.38 G: 36; 4.5 INJECTION, POWDER, FOR SOLUTION INTRAVENOUS at 21:12

## 2023-10-20 RX ADMIN — POTASSIUM CHLORIDE 40 MEQ: 1500 TABLET, EXTENDED RELEASE ORAL at 17:49

## 2023-10-20 RX ADMIN — METOPROLOL TARTRATE 25 MG: 25 TABLET, FILM COATED ORAL at 21:46

## 2023-10-20 RX ADMIN — SENNOSIDES 8.6 MG: 8.6 TABLET, FILM COATED ORAL at 08:28

## 2023-10-20 RX ADMIN — LEVALBUTEROL HYDROCHLORIDE 0.63 MG: 0.63 SOLUTION RESPIRATORY (INHALATION) at 02:39

## 2023-10-20 RX ADMIN — LOSARTAN POTASSIUM 50 MG: 50 TABLET, FILM COATED ORAL at 12:37

## 2023-10-20 RX ADMIN — APIXABAN 2.5 MG: 2.5 TABLET, FILM COATED ORAL at 17:50

## 2023-10-20 NOTE — ASSESSMENT & PLAN NOTE
Malnutrition Findings:   Adult Malnutrition type: Chronic illness  Adult Degree of Malnutrition: Other severe protein calorie malnutrition  Malnutrition Characteristics: Fat loss, Muscle loss                  360 Statement: Severe protein calorie malnutrition of chronic illness related to inadequate energy intake as evidenced by hollow orbits, depression at the temples, protruding clavicles. Treated with modified diet and supplements    BMI Findings:  Adult BMI Classifications: Underweight < 18.5        Body mass index is 18.44 kg/m².

## 2023-10-20 NOTE — PROGRESS NOTES
Progress Note - Cardiology   [facility]     Manish Contreras 81 y.o. male MRN: [id number]  : 1940  Unit/Bed#: [address] [place] [address] Encounter: [id number]    Assessment and Plan:   1. Acute respiratory failure with hypoxia question aspiration pneumonia: Concern for sepsis on admission    -   Speech therapy information appreciated and patient started on thicker liquids    -   Continue Zosyn 3.375 mg every 6 hours per primary team    2. Paroxysmal atrial fibrillation: Patient with documented rapid atrial fibrillation in the setting of sepsis    -   Continue Lopressor 25 mg twice daily    -   [id number] score = 3, family agreeable to anticoagulation    -   We will start Eliquis 2.5 mg twice daily    -   Dose reduction due to age of 81 and weight less than 60 kg    3. Nonischemic myocardial injury secondary to sepsis, tachycardia and ST depression seen concerning for subendocardial injury: Lopressor 25 mg every 12 hours    -   Continue aspirin 81 mg once a day    -   Family request optimal medical therapy without any invasive procedures    4. Coronary artery disease: Many years ago patient had been seen by [facility] cardiology in [facility]. He was documented as having coronary artery disease with history of PCI in 2019, but unfortunately vessel was not noted    -   We will continue optimal medical therapy    5. Hypertension: Labetalol discontinued and patient transition to Lopressor due to paroxysmal atrial fibrillation    -   We will add Cozaar 50 mg daily    -   Continue to monitor vital signs and adjust medication as needed    6. Dementia: Continue supportive therapy and redirection    7. Urinary retention: Calderon catheter inserted in the emergency room on admission with return of 1 L of fluid    -   Urology following    Subjective / Objective:   Patient seen and examined. No complaints offered at this time. Blood pressures noted to be elevated.   Will add Cozaar 50 mg once a day and continue to monitor. Primary team discussed patient condition with family yesterday. At this time, they are agreeable to start anticoagulation due to his paroxysmal atrial fibrillation. We will start Eliquis at 2.5 mg twice daily due to age and weight less than 60 kg    Vitals: Blood pressure (!) 175/91, pulse 77, temperature (!) 97.3 °F (36.3 °C), resp. rate 18, height 5' 7" (1.702 m), weight 53.4 kg (117 lb 11.6 oz), SpO2 98 %. Vitals:    10/19/23 0318 10/19/23 0729   Weight: 53.4 kg (117 lb 12.8 oz) 53.4 kg (117 lb 11.6 oz)     Body mass index is 18.44 kg/m². BP Readings from Last 3 Encounters:   10/20/23 (!) 175/91   07/04/23 122/77   04/20/23 (!) 185/91     Orthostatic Blood Pressures      Flowsheet Row Most Recent Value   Blood Pressure 175/91 filed at 10/20/2023 0722          I/O         10/18 0701  10/19 0700 10/19 0701  10/20 0700 10/20 0701  10/21 0700    P. O.  440 240    IV Piggyback 1800      Total Intake(mL/kg) 1800 (33.7) 440 (8.2) 240 (4.5)    Urine (mL/kg/hr) 100      Total Output 100      Net +1700 +440 +240                 Invasive Devices       Peripheral Intravenous Line  Duration             Peripheral IV 10/18/23 Left Antecubital 1 day    Peripheral IV 10/18/23 Right;Ventral (anterior) Hand 1 day              Drain  Duration             Urethral Catheter Non-latex 12 Fr. 1 day                      Intake/Output Summary (Last 24 hours) at 10/20/2023 1053  Last data filed at 10/20/2023 0824  Gross per 24 hour   Intake 680 ml   Output --   Net 680 ml         Physical Exam:   Physical Exam  Vitals and nursing note reviewed. Constitutional:       Appearance: Normal appearance. He is underweight. He is ill-appearing (Chronically). HENT:      Right Ear: External ear normal.      Left Ear: External ear normal.   Eyes:      General: No scleral icterus. Right eye: No discharge. Left eye: No discharge. Cardiovascular:      Rate and Rhythm: Normal rate and regular rhythm. Pulses: Normal pulses. Pulmonary:      Effort: Pulmonary effort is normal.      Breath sounds: Examination of the right-lower field reveals decreased breath sounds. Examination of the left-lower field reveals decreased breath sounds. Decreased breath sounds present. Abdominal:      General: Bowel sounds are normal. There is no distension. Palpations: Abdomen is soft. Musculoskeletal:      Right lower leg: No edema. Left lower leg: No edema. Skin:     General: Skin is warm and dry. Capillary Refill: Capillary refill takes less than 2 seconds. Neurological:      General: No focal deficit present. Mental Status: He is alert. Mental status is at baseline.    Psychiatric:         Mood and Affect: Mood normal.              Medications/ Allergies:     Current Facility-Administered Medications   Medication Dose Route Frequency Provider Last Rate    acetaminophen  650 mg Oral Q6H [facility]N [facility]      apixaban  2.5 mg Oral BID Ira Moran, CRNP      aspirin  81 mg Oral Daily [facility]      donepezil  5 mg Oral HS [facility]      famotidine  20 mg Intravenous Q24H [facility]      glycerin-hypromellose-  1 drop Both Eyes BID [facility] CR[facility]      levalbuterol  0.63 mg Nebulization Q4H PRN [facility] CR[facility]      levothyroxine  50 mcg Oral Early Morning [facility] CR[facility]      metoprolol tartrate  25 mg Oral Q12H [facility] Ira Moran, CRNP      nitroglycerin  0.4 mg Sublingual Q5 Min PRN [facility] CR[facility]      ondansetron  4 mg Intravenous Q6H PRN [facility] CR[facility]      piperacillin-tazobactam  3.375 g Intravenous Q6H [facility] CR[facility]      saccharomyces boulardii  250 mg Oral BID [facility], CRNP      senna  1 tablet Oral BID [facility] CRNP      tamsulosin  0.4 mg Oral Daily With CDP, CRNP       acetaminophen, 650 mg, Q6H PRN  levalbuterol, 0.63 mg, Q4H PRN  nitroglycerin, 0.4 mg, Q5 Min PRN  ondansetron, 4 mg, Q6H PRN      Allergies   Allergen Reactions    Shellfish-Derived Products - Food Allergy Other (See Comments)     Not able to assess       VTE Pharmacologic Prophylaxis:   Eliquis    Labs:   Troponins:  Results from last 7 days   Lab Units 10/19/23  0443 10/19/23  0116 10/18/23  2250   HS TNI RAND ng/L 225*  --   --    HSTNI D2 ng/L  --   --  -65   HSTNI D4 ng/L  --  -170  --      CBC with diff:  Results from last 7 days   Lab Units 10/20/23  0949 10/19/23  0443 10/18/23  2101   WBC Thousand/uL 5.33 7.73 10.36*   HEMOGLOBIN g/dL 11.3* 11.5* 14.1   HEMATOCRIT % 35.5* 36.2* 42.8   MCV fL 91 91 89   PLATELETS Thousands/uL 158 160 192   RBC Million/uL 3.89 3.97 4.80   MCH pg 29.0 29.0 29.4   MCHC g/dL 31.8 31.8 32.9   RDW % 14.6 14.8 14.9   MPV fL 11.2 11.6 11.8     CMP:  Results from last 7 days   Lab Units 10/20/23  0949 10/19/23  0443 10/18/23  2101   SODIUM mmol/L 149* 146 142   POTASSIUM mmol/L 3.1* 3.7 4.3   CHLORIDE mmol/L 116* 114* 107   CO2 mmol/L 27 24 25   ANION GAP mmol/L 6 8 10   BUN mg/dL 24 33* 37*   CREATININE mg/dL 0.89 0.81 1.05   CALCIUM mg/dL 9.1 8.8 9.7   AST U/L  --   --  15   ALT U/L  --   --  8   ALK PHOS U/L  --   --  61   TOTAL PROTEIN g/dL  --   --  6.9   ALBUMIN g/dL  --   --  3.5   TOTAL BILIRUBIN mg/dL  --   --  0.90   EGFR ml/min/1.73sq m 79 82 65       Magnesium:  Results from last 7 days   Lab Units 10/20/23  0949 10/19/23  0443 10/18/23  2101   MAGNESIUM mg/dL 1.9 2.2 2.4     Coags:  Results from last 7 days   Lab Units 10/20/23  0949 10/20/23  0156 10/19/23  2018 10/19/23  1248 10/19/23  0443 10/18/23  2101   PTT seconds 86* 33 62* 59* 50* 33   INR   --   --   --   --   --  1.30*     TSH:  Results from last 7 days   Lab Units 10/19/23  0443   TSH 3RD GENERATON uIU/mL 0.573     No components found for: "TSH3"  Lipid Profile:  Results from last 7 days   Lab Units 10/19/23  0443   CHOLESTEROL mg/dL 110   TRIGLYCERIDES mg/dL 96   HDL mg/dL 19*   LDL CALC mg/dL 72     Hgb A1c:  Results from last 7 days   Lab Units 10/18/23  2101   HEMOGLOBIN A1C % 5.5       Imaging & Testing   I have personally reviewed pertinent reports. Echo complete w/ contrast if indicated    Result Date: 10/19/2023  Narrative:   Left Ventricle: Left ventricular cavity size is normal. Wall thickness is mildly increased at intraventricular septum of 1.3cm The left ventricular ejection fraction is 61% by visual estimation. . Systolic function is normal. Wall motion is normal. Diastolic function is normal for age. Right Ventricle: Systolic function is normal. Normal tricuspid annular plane systolic excursion (TAPSE) > 1.7 cm. Left Atrium: The atrium is normal in size. Right Atrium: The atrium is mildly dilated. XR chest 1 view portable    Result Date: 10/19/2023  Narrative: CHEST INDICATION:   lethargy. COMPARISON: CXR and chest CT 6/30/2023. EXAM PERFORMED/VIEWS:  XR CHEST PORTABLE. FINDINGS: Cardiomediastinal silhouette normal. Slightly increased opacity in the left base. No effusion or pneumothorax. Upper abdomen normal. Bones normal for age. Impression: Slightly increased opacity in the left base, question recurrent pneumonia or postinfectious scar from previous pneumonia. Workstation performed: [id number]     CT head wo contrast    Result Date: 10/19/2023  Narrative: CT BRAIN - WITHOUT CONTRAST INDICATION:   AMS. Tilmon Fluke Altered mental status. COMPARISON: CT head without contrast 6/30/2023, 4/19/2023. TECHNIQUE:  CT examination of the brain was performed. Multiplanar 2D reformatted images were created from the source data. Radiation dose length product (DLP) for this visit:  911.68 mGy-cm . This examination, like all CT scans performed in the [facility], was performed utilizing techniques to minimize radiation dose exposure, including the use of iterative  reconstruction and automated exposure control. IMAGE QUALITY:  Diagnostic.  FINDINGS: PARENCHYMA: Decreased attenuation is noted in periventricular and subcortical white matter demonstrating an appearance that is statistically most likely to represent mild microangiopathic change. Chronic lacunar infarct in right posterior limb of right internal capsule. No CT signs of acute infarction. No intracranial mass, mass effect or midline shift. No acute parenchymal hemorrhage. Arterial calcifications of carotid siphons and bilateral intradural vertebral arteries. VENTRICLES AND EXTRA-AXIAL SPACES:  Ventricles and extra-axial CSF spaces are prominent commensurate with the degree of volume loss. No hydrocephalus. No acute extra-axial hemorrhage. Small retrocerebellar arachnoid cyst. VISUALIZED ORBITS: Normal visualized orbits. PARANASAL SINUSES: Normal visualized paranasal sinuses. CALVARIUM AND EXTRACRANIAL SOFT TISSUES:  Normal.     Impression: No acute intracranial abnormality. Workstation performed: [id number]      "This note was completed in part utilizing [employer] direct voice recognition software. Grammatical errors, random word insertion, spelling mistakes, and incomplete sentences may be an occasional consequence of the system secondary to software limitations, ambient noise and hardware issues. Please read the chart carefully and recognize, using context, where substitutions have occurred.   If you have any questions or concerns about the context, text or information contained within the body of this dictation, please contact myself, the provider, for further clarification."

## 2023-10-20 NOTE — DISCHARGE INSTR - DIET
Pureed food, honey thick liquid. Feed only when alert & upright with head supported. MBS completed (please see report for specifics) or call to request copy if needed. Neno Maillard [address]. [email or web address]

## 2023-10-20 NOTE — PLAN OF CARE
Problem: PAIN - ADULT  Goal: Verbalizes/displays adequate comfort level or baseline comfort level  Description: Interventions:  - Encourage patient to monitor pain and request assistance  - Assess pain using appropriate pain scale  - Administer analgesics based on type and severity of pain and evaluate response  - Implement non-pharmacological measures as appropriate and evaluate response  - Consider cultural and social influences on pain and pain management  - Notify physician/advanced practitioner if interventions unsuccessful or patient reports new pain  Outcome: Progressing     Problem: INFECTION - ADULT  Goal: Absence or prevention of progression during hospitalization  Description: INTERVENTIONS:  - Assess and monitor for signs and symptoms of infection  - Monitor lab/diagnostic results  - Monitor all insertion sites, i.e. indwelling lines, tubes, and drains  - Monitor endotracheal if appropriate and nasal secretions for changes in amount and color  - Johnson appropriate cooling/warming therapies per order  - Administer medications as ordered  - Instruct and encourage patient and family to use good hand hygiene technique  - Identify and instruct in appropriate isolation precautions for identified infection/condition  Outcome: Progressing  Goal: Absence of fever/infection during neutropenic period  Description: INTERVENTIONS:  - Monitor WBC    Outcome: Progressing     Problem: SAFETY ADULT  Goal: Patient will remain free of falls  Description: INTERVENTIONS:  - Educate patient/family on patient safety including physical limitations  - Instruct patient to call for assistance with activity   - Consult OT/PT to assist with strengthening/mobility   - Keep Call bell within reach  - Keep bed low and locked with side rails adjusted as appropriate  - Keep care items and personal belongings within reach  - Initiate and maintain comfort rounds  - Make Fall Risk Sign visible to staff  - Offer Toileting every 2 Hours, in advance of need  - Initiate/Maintain bed alarm  - Obtain necessary fall risk management equipment: yes  - Apply yellow socks and bracelet for high fall risk patients  - Consider moving patient to room near nurses station  Outcome: Progressing  Goal: Maintain or return to baseline ADL function  Description: INTERVENTIONS:  -  Assess patient's ability to carry out ADLs; assess patient's baseline for ADL function and identify physical deficits which impact ability to perform ADLs (bathing, care of mouth/teeth, toileting, grooming, dressing, etc.)  - Assess/evaluate cause of self-care deficits   - Assess range of motion  - Assess patient's mobility; develop plan if impaired  - Assess patient's need for assistive devices and provide as appropriate  - Encourage maximum independence but intervene and supervise when necessary  - Involve family in performance of ADLs  - Assess for home care needs following discharge   - Consider OT consult to assist with ADL evaluation and planning for discharge  - Provide patient education as appropriate  Outcome: Progressing  Goal: Maintains/Returns to pre admission functional level  Description: INTERVENTIONS:  - Perform BMAT or MOVE assessment daily.   - Set and communicate daily mobility goal to care team and patient/family/caregiver. - Collaborate with rehabilitation services on mobility goals if consulted  - Perform Range of Motion 3 times a day. - Reposition patient every 2 hours.   - Dangle patient 3 times a day  - Stand patient 3 times a day  - Ambulate patient 3 times a day  - Out of bed to chair 3 times a day   - Out of bed for meals 3 times a day  - Out of bed for toileting  - Record patient progress and toleration of activity level   Outcome: Progressing     Problem: DISCHARGE PLANNING  Goal: Discharge to home or other facility with appropriate resources  Description: INTERVENTIONS:  - Identify barriers to discharge w/patient and caregiver  - Arrange for needed discharge resources and transportation as appropriate  - Identify discharge learning needs (meds, wound care, etc.)  - Arrange for interpretive services to assist at discharge as needed  - Refer to Case Management Department for coordinating discharge planning if the patient needs post-hospital services based on physician/advanced practitioner order or complex needs related to functional status, cognitive ability, or social support system  Outcome: Progressing     Problem: Knowledge Deficit  Goal: Patient/family/caregiver demonstrates understanding of disease process, treatment plan, medications, and discharge instructions  Description: Complete learning assessment and assess knowledge base.   Interventions:  - Provide teaching at level of understanding  - Provide teaching via preferred learning methods  Outcome: Progressing     Problem: CARDIOVASCULAR - ADULT  Goal: Maintains optimal cardiac output and hemodynamic stability  Description: INTERVENTIONS:  - Monitor I/O, vital signs and rhythm  - Monitor for S/S and trends of decreased cardiac output  - Administer and titrate ordered vasoactive medications to optimize hemodynamic stability  - Assess quality of pulses, skin color and temperature  - Assess for signs of decreased coronary artery perfusion  - Instruct patient to report change in severity of symptoms  Outcome: Progressing  Goal: Absence of cardiac dysrhythmias or at baseline rhythm  Description: INTERVENTIONS:  - Continuous cardiac monitoring, vital signs, obtain 12 lead EKG if ordered  - Administer antiarrhythmic and heart rate control medications as ordered  - Monitor electrolytes and administer replacement therapy as ordered  Outcome: Progressing     Problem: RESPIRATORY - ADULT  Goal: Achieves optimal ventilation and oxygenation  Description: INTERVENTIONS:  - Assess for changes in respiratory status  - Assess for changes in mentation and behavior  - Position to facilitate oxygenation and minimize respiratory effort  - Oxygen administered by appropriate delivery if ordered  - Initiate smoking cessation education as indicated  - Encourage broncho-pulmonary hygiene including cough, deep breathe, Incentive Spirometry  - Assess the need for suctioning and aspirate as needed  - Assess and instruct to report SOB or any respiratory difficulty  - Respiratory Therapy support as indicated  Outcome: Progressing     Problem: GASTROINTESTINAL - ADULT  Goal: Maintains adequate nutritional intake  Description: INTERVENTIONS:  - Monitor percentage of each meal consumed  - Identify factors contributing to decreased intake, treat as appropriate  - Assist with meals as needed  - Monitor I&O, weight, and lab values if indicated  - Obtain nutrition services referral as needed  Outcome: Progressing  Goal: Oral mucous membranes remain intact  Description: INTERVENTIONS  - Assess oral mucosa and hygiene practices  - Implement preventative oral hygiene regimen  - Implement oral medicated treatments as ordered  - Initiate Nutrition services referral as needed  Outcome: Progressing     Problem: GENITOURINARY - ADULT  Goal: Urinary catheter remains patent  Description: INTERVENTIONS:  - Assess patency of urinary catheter  - If patient has a chronic márquez, consider changing catheter if non-functioning  - Follow guidelines for intermittent irrigation of non-functioning urinary catheter  Outcome: Progressing     Problem: METABOLIC, FLUID AND ELECTROLYTES - ADULT  Goal: Electrolytes maintained within normal limits  Description: INTERVENTIONS:  - Monitor labs and assess patient for signs and symptoms of electrolyte imbalances  - Administer electrolyte replacement as ordered  - Monitor response to electrolyte replacements, including repeat lab results as appropriate  - Instruct patient on fluid and nutrition as appropriate  Outcome: Progressing  Goal: Fluid balance maintained  Description: INTERVENTIONS:  - Monitor labs   - Monitor I/O and WT  - Instruct patient on fluid and nutrition as appropriate  - Assess for signs & symptoms of volume excess or deficit  Outcome: Progressing     Problem: SKIN/TISSUE INTEGRITY - ADULT  Goal: Skin Integrity remains intact(Skin Breakdown Prevention)  Description: Assess:  -Perform Jose assessment every shift  -Clean and moisturize skin every shift or as needed  -Inspect skin when repositioning, toileting, and assisting with ADLS  -Assess under medical devices such as telemetry every 2-4 hr  -Assess extremities for adequate circulation and sensation     Bed Management:  -Have minimal linens on bed & keep smooth, unwrinkled  -Change linens as needed when moist or perspiring  -Avoid sitting or lying in one position for more than 2 hours while in bed  -Keep HOB at 45 degrees     Toileting:  -Offer bedside commode  -Assess for incontinence every 2 hr  -Use incontinent care products after each incontinent episode such as foam cleanser    Activity:  -Mobilize patient 3 times a day  -Encourage activity and walks on unit  -Encourage or provide ROM exercises   -Turn and reposition patient every 2 Hours  -Use appropriate equipment to lift or move patient in bed  -Instruct/ Assist with weight shifting every 60 min when out of bed in chair  -Consider limitation of chair time 2 hour intervals    Skin Care:  -Avoid use of baby powder, tape, friction and shearing, hot water or constrictive clothing  -Relieve pressure over bony prominences using allyven, waffle cushion  -Do not massage red bony areas    Next Steps:  -Teach patient strategies to minimize risks such as weight shifting   -Consider consults to  interdisciplinary teams such as wound care  Outcome: Progressing     Problem: MOBILITY - ADULT  Goal: Maintain or return to baseline ADL function  Description: INTERVENTIONS:  -  Assess patient's ability to carry out ADLs; assess patient's baseline for ADL function and identify physical deficits which impact ability to perform ADLs (bathing, care of mouth/teeth, toileting, grooming, dressing, etc.)  - Assess/evaluate cause of self-care deficits   - Assess range of motion  - Assess patient's mobility; develop plan if impaired  - Assess patient's need for assistive devices and provide as appropriate  - Encourage maximum independence but intervene and supervise when necessary  - Involve family in performance of ADLs  - Assess for home care needs following discharge   - Consider OT consult to assist with ADL evaluation and planning for discharge  - Provide patient education as appropriate  Outcome: Progressing  Goal: Maintains/Returns to pre admission functional level  Description: INTERVENTIONS:  - Perform BMAT or MOVE assessment daily.   - Set and communicate daily mobility goal to care team and patient/family/caregiver. - Collaborate with rehabilitation services on mobility goals if consulted  - Perform Range of Motion 3 times a day. - Reposition patient every 2 hours.   - Dangle patient 3 times a day  - Stand patient 3 times a day  - Ambulate patient 3 times a day  - Out of bed to chair 3 times a day   - Out of bed for meals 3 times a day  - Out of bed for toileting  - Record patient progress and toleration of activity level   Outcome: Progressing     Problem: Prexisting or High Potential for Compromised Skin Integrity  Goal: Skin integrity is maintained or improved  Description: INTERVENTIONS:  - Identify patients at risk for skin breakdown  - Assess and monitor skin integrity  - Assess and monitor nutrition and hydration status  - Monitor labs   - Assess for incontinence   - Turn and reposition patient  - Assist with mobility/ambulation  - Relieve pressure over bony prominences  - Avoid friction and shearing  - Provide appropriate hygiene as needed including keeping skin clean and dry  - Evaluate need for skin moisturizer/barrier cream  - Collaborate with interdisciplinary team   - Patient/family teaching  - Consider wound care consult   Outcome: Progressing     Problem: Nutrition/Hydration-ADULT  Goal: Nutrient/Hydration intake appropriate for improving, restoring or maintaining nutritional needs  Description: Monitor and assess patient's nutrition/hydration status for malnutrition. Collaborate with interdisciplinary team and initiate plan and interventions as ordered. Monitor patient's weight and dietary intake as ordered or per policy. Utilize nutrition screening tool and intervene as necessary. Determine patient's food preferences and provide high-protein, high-caloric foods as appropriate.      INTERVENTIONS:  - Monitor oral intake, urinary output, labs, and treatment plans  - Assess nutrition and hydration status and recommend course of action  - Evaluate amount of meals eaten  - Assist patient with eating if necessary   - Allow adequate time for meals  - Recommend/ encourage appropriate diets, oral nutritional supplements, and vitamin/mineral supplements  - Order, calculate, and assess calorie counts as needed  - Recommend, monitor, and adjust tube feedings and TPN/PPN based on assessed needs  - Assess need for intravenous fluids  - Provide specific nutrition/hydration education as appropriate  - Include patient/family/caregiver in decisions related to nutrition  Outcome: Progressing

## 2023-10-20 NOTE — PLAN OF CARE
Problem: PAIN - ADULT  Goal: Verbalizes/displays adequate comfort level or baseline comfort level  Description: Interventions:  - Encourage patient to monitor pain and request assistance  - Assess pain using appropriate pain scale  - Administer analgesics based on type and severity of pain and evaluate response  - Implement non-pharmacological measures as appropriate and evaluate response  - Consider cultural and social influences on pain and pain management  - Notify physician/advanced practitioner if interventions unsuccessful or patient reports new pain  Outcome: Progressing     Problem: INFECTION - ADULT  Goal: Absence or prevention of progression during hospitalization  Description: INTERVENTIONS:  - Assess and monitor for signs and symptoms of infection  - Monitor lab/diagnostic results  - Monitor all insertion sites, i.e. indwelling lines, tubes, and drains  - Monitor endotracheal if appropriate and nasal secretions for changes in amount and color  - [place] appropriate cooling/warming therapies per order  - Administer medications as ordered  - Instruct and encourage patient and family to use good hand hygiene technique  - Identify and instruct in appropriate isolation precautions for identified infection/condition  Outcome: Progressing  Goal: Absence of fever/infection during neutropenic period  Description: INTERVENTIONS:  - Monitor WBC    Outcome: Progressing     Problem: SAFETY ADULT  Goal: Patient will remain free of falls  Description: INTERVENTIONS:  - Educate patient/family on patient safety including physical limitations  - Instruct patient to call for assistance with activity   - Consult OT/PT to assist with strengthening/mobility   - Keep Call bell within reach  - Keep bed low and locked with side rails adjusted as appropriate  - Keep care items and personal belongings within reach  - Initiate and maintain comfort rounds  - Make Fall Risk Sign visible to staff  - Offer Toileting every 2 Hours, in advance of need  - Initiate/Maintain bed alarm  - Obtain necessary fall risk management equipment: yes  - Apply yellow socks and bracelet for high fall risk patients  - Consider moving patient to room near nurses station  Outcome: Progressing  Goal: Maintain or return to baseline ADL function  Description: INTERVENTIONS:  -  Assess patient's ability to carry out ADLs; assess patient's baseline for ADL function and identify physical deficits which impact ability to perform ADLs (bathing, care of mouth/teeth, toileting, grooming, dressing, etc.)  - Assess/evaluate cause of self-care deficits   - Assess range of motion  - Assess patient's mobility; develop plan if impaired  - Assess patient's need for assistive devices and provide as appropriate  - Encourage maximum independence but intervene and supervise when necessary  - Involve family in performance of ADLs  - Assess for home care needs following discharge   - Consider OT consult to assist with ADL evaluation and planning for discharge  - Provide patient education as appropriate  Outcome: Progressing  Goal: Maintains/Returns to pre admission functional level  Description: INTERVENTIONS:  - Perform BMAT or MOVE assessment daily.   - Set and communicate daily mobility goal to care team and patient/family/caregiver. - Collaborate with rehabilitation services on mobility goals if consulted  - Perform Range of Motion 3 times a day. - Reposition patient every 2 hours.   - Dangle patient 3 times a day  - Stand patient 3 times a day  - Ambulate patient 3 times a day  - Out of bed to chair 3 times a day   - Out of bed for meals 3 times a day  - Out of bed for toileting  - Record patient progress and toleration of activity level   Outcome: Progressing     Problem: DISCHARGE PLANNING  Goal: Discharge to home or other facility with appropriate resources  Description: INTERVENTIONS:  - Identify barriers to discharge w/patient and caregiver  - Arrange for needed discharge resources and transportation as appropriate  - Identify discharge learning needs (meds, wound care, etc.)  - Arrange for interpretive services to assist at discharge as needed  - Refer to Case Management Department for coordinating discharge planning if the patient needs post-hospital services based on physician/advanced practitioner order or complex needs related to functional status, cognitive ability, or social support system  Outcome: Progressing     Problem: Knowledge Deficit  Goal: Patient/family/caregiver demonstrates understanding of disease process, treatment plan, medications, and discharge instructions  Description: Complete learning assessment and assess knowledge base.   Interventions:  - Provide teaching at level of understanding  - Provide teaching via preferred learning methods  Outcome: Progressing     Problem: CARDIOVASCULAR - ADULT  Goal: Maintains optimal cardiac output and hemodynamic stability  Description: INTERVENTIONS:  - Monitor I/O, vital signs and rhythm  - Monitor for S/S and trends of decreased cardiac output  - Administer and titrate ordered vasoactive medications to optimize hemodynamic stability  - Assess quality of pulses, skin color and temperature  - Assess for signs of decreased coronary artery perfusion  - Instruct patient to report change in severity of symptoms  Outcome: Progressing  Goal: Absence of cardiac dysrhythmias or at baseline rhythm  Description: INTERVENTIONS:  - Continuous cardiac monitoring, vital signs, obtain 12 lead EKG if ordered  - Administer antiarrhythmic and heart rate control medications as ordered  - Monitor electrolytes and administer replacement therapy as ordered  Outcome: Progressing     Problem: RESPIRATORY - ADULT  Goal: Achieves optimal ventilation and oxygenation  Description: INTERVENTIONS:  - Assess for changes in respiratory status  - Assess for changes in mentation and behavior  - Position to facilitate oxygenation and minimize respiratory effort  - Oxygen administered by appropriate delivery if ordered  - Initiate smoking cessation education as indicated  - Encourage broncho-pulmonary hygiene including cough, deep breathe, Incentive Spirometry  - Assess the need for suctioning and aspirate as needed  - Assess and instruct to report SOB or any respiratory difficulty  - Respiratory Therapy support as indicated  Outcome: Progressing     Problem: GASTROINTESTINAL - ADULT  Goal: Maintains adequate nutritional intake  Description: INTERVENTIONS:  - Monitor percentage of each meal consumed  - Identify factors contributing to decreased intake, treat as appropriate  - Assist with meals as needed  - Monitor I&O, weight, and lab values if indicated  - Obtain nutrition services referral as needed  Outcome: Progressing  Goal: Oral mucous membranes remain intact  Description: INTERVENTIONS  - Assess oral mucosa and hygiene practices  - Implement preventative oral hygiene regimen  - Implement oral medicated treatments as ordered  - Initiate Nutrition services referral as needed  Outcome: Progressing     Problem: GENITOURINARY - ADULT  Goal: Urinary catheter remains patent  Description: INTERVENTIONS:  - Assess patency of urinary catheter  - If patient has a chronic márquez, consider changing catheter if non-functioning  - Follow guidelines for intermittent irrigation of non-functioning urinary catheter  Outcome: Progressing     Problem: METABOLIC, FLUID AND ELECTROLYTES - ADULT  Goal: Electrolytes maintained within normal limits  Description: INTERVENTIONS:  - Monitor labs and assess patient for signs and symptoms of electrolyte imbalances  - Administer electrolyte replacement as ordered  - Monitor response to electrolyte replacements, including repeat lab results as appropriate  - Instruct patient on fluid and nutrition as appropriate  Outcome: Progressing  Goal: Fluid balance maintained  Description: INTERVENTIONS:  - Monitor labs   - Monitor I/O and WT  - Instruct patient on fluid and nutrition as appropriate  - Assess for signs & symptoms of volume excess or deficit  Outcome: Progressing     Problem: SKIN/TISSUE INTEGRITY - ADULT  Goal: Skin Integrity remains intact(Skin Breakdown Prevention)  Description: Assess:  -Perform Jose assessment every day  -Clean and moisturize skin every shift  -Inspect skin when repositioning, toileting, and assisting with ADLS  -Assess extremities for adequate circulation and sensation     Bed Management:  -Have minimal linens on bed & keep smooth, unwrinkled  -Change linens as needed when moist or perspiring  -Avoid sitting or lying in one position for more than 2 hours while in bed  -Keep HOB at 45 degrees       Activity:  -Encourage activity and walks on unit  -Encourage or provide ROM exercises   -Turn and reposition patient every 2 Hours  -Use appropriate equipment to lift or move patient in bed  -Instruct/ Assist with weight shifting every 20 minutes when out of bed in chair  -Consider limitation of chair time 2 hour intervals    Skin Care:  -Avoid use of baby powder, tape, friction and shearing, hot water or constrictive clothing  -Do not massage red bony areas      Outcome: Progressing     Problem: MOBILITY - ADULT  Goal: Maintain or return to baseline ADL function  Description: INTERVENTIONS:  -  Assess patient's ability to carry out ADLs; assess patient's baseline for ADL function and identify physical deficits which impact ability to perform ADLs (bathing, care of mouth/teeth, toileting, grooming, dressing, etc.)  - Assess/evaluate cause of self-care deficits   - Assess range of motion  - Assess patient's mobility; develop plan if impaired  - Assess patient's need for assistive devices and provide as appropriate  - Encourage maximum independence but intervene and supervise when necessary  - Involve family in performance of ADLs  - Assess for home care needs following discharge   - Consider OT consult to assist with ADL evaluation and planning for discharge  - Provide patient education as appropriate  Outcome: Progressing  Goal: Maintains/Returns to pre admission functional level  Description: INTERVENTIONS:  - Perform BMAT or MOVE assessment daily.   - Set and communicate daily mobility goal to care team and patient/family/caregiver. - Collaborate with rehabilitation services on mobility goals if consulted  - Perform Range of Motion 3 times a day. - Reposition patient every 2 hours. - Dangle patient 3 times a day  - Stand patient 3 times a day  - Ambulate patient 3 times a day  - Out of bed to chair 3 times a day   - Out of bed for meals 3 times a day  - Out of bed for toileting  - Record patient progress and toleration of activity level   Outcome: Progressing     Problem: Prexisting or High Potential for Compromised Skin Integrity  Goal: Skin integrity is maintained or improved  Description: INTERVENTIONS:  - Identify patients at risk for skin breakdown  - Assess and monitor skin integrity  - Assess and monitor nutrition and hydration status  - Monitor labs   - Assess for incontinence   - Turn and reposition patient  - Assist with mobility/ambulation  - Relieve pressure over bony prominences  - Avoid friction and shearing  - Provide appropriate hygiene as needed including keeping skin clean and dry  - Evaluate need for skin moisturizer/barrier cream  - Collaborate with interdisciplinary team   - Patient/family teaching  - Consider wound care consult   Outcome: Progressing     Problem: Nutrition/Hydration-ADULT  Goal: Nutrient/Hydration intake appropriate for improving, restoring or maintaining nutritional needs  Description: Monitor and assess patient's nutrition/hydration status for malnutrition. Collaborate with interdisciplinary team and initiate plan and interventions as ordered. Monitor patient's weight and dietary intake as ordered or per policy. Utilize nutrition screening tool and intervene as necessary.  Determine patient's food preferences and provide high-protein, high-caloric foods as appropriate.      INTERVENTIONS:  - Monitor oral intake, urinary output, labs, and treatment plans  - Assess nutrition and hydration status and recommend course of action  - Evaluate amount of meals eaten  - Assist patient with eating if necessary   - Allow adequate time for meals  - Recommend/ encourage appropriate diets, oral nutritional supplements, and vitamin/mineral supplements  - Order, calculate, and assess calorie counts as needed  - Recommend, monitor, and adjust tube feedings and TPN/PPN based on assessed needs  - Assess need for intravenous fluids  - Provide specific nutrition/hydration education as appropriate  - Include patient/family/caregiver in decisions related to nutrition  Outcome: Progressing

## 2023-10-20 NOTE — SPEECH THERAPY NOTE
Speech/Language Pathology Progress Note    Patient Name: Pola Moritz THJUY'S Date: 10/20/2023     Problem List  Principal Problem:    Acute respiratory failure with hypoxia [facility])  Active Problems:    Acute encephalopathy    Severe dementia ([address])    Essential hypertension    Sepsis ([address])    Urinary retention    Atrial fibrillation with rapid ventricular response (HCC)    Elevated troponin    CAD (coronary artery disease)    Severe protein-calorie malnutrition (HCC)    Subjective:  Pt alert & upright on arrival.     Objective:  Pt was seen for diagnostic dysphagia therapy (diet tolerance, appropriateness of MBS). Pt was alert and positioned upright. Staff had fed pt (hot pureed) lunch. Pt was assessed 4 ozs puree (applesauce with medication/pudding), 3ozs HTL and ~3-4 of NTL. Bolus formation and transfer were effective with puree (but retention of v small pieces of pill noted). V small pieces eventually cleared with admin of ample amounts of puree/HTL   Swallow initiation observed with swallows (min cues needed for task persistence/"please swallow"). No coughing, throat clearing, change in BS noted c intake of puree or HTL. Cough and rhonchi noted after trial of limited amount of nectar thick liquid. Assessment:  S/S suggestive of aspiration persist with NTL. Pt with dementia and unable to follow commands/apply strategies. Pt also with s/s oral dysphagia. Plan/Recommendations:  Smooth pureed food, honey/moderately thick liquid. Medications crushed (please crush finely).     Madina Snider [id number] [facility] [id number]

## 2023-10-20 NOTE — PROGRESS NOTES
[id number] [facility]  Progress Note  Name: Adam Chase  MRN: [id number]  Unit/Bed#: 4 [address] I Date of Admission: 10/18/2023   Date of Service: 10/20/2023 I Hospital Day: 2    Assessment/Plan   * Acute respiratory failure with hypoxia [facility])  Assessment & Plan  Patient presents with hypoxia, SPO2 in 80s on room air since yesterday in nursing home, was placed on O2 5 L in nursing home to get SPO2 above 90%. Per staff, patient has not been eating or drinking much for at least 2 days, having cough with nectar thick liquids, having audible wheezing, not responding as he normally does for at least 2 days. At baseline patient is able to talk a few words, able to feed self, able to follow simple directions and able to do puzzle and stack blocks per staff. Patient has not been able to do all of the above for about one week. Had a chest x-ray yesterday in nursing home which was unremarkable. Patient was given Levaquin 500mg p.o. prior to coming to ED. No fever in nursing home per staff. Chart reviewed. Patient was hospitalized between 6/30-7/4/2023 for severe sepsis secondary to left-sided pneumonia, acute hypoxic respiratory failure, UTI. Patient was treated with cefepime and cefdinir for 7 days total.  Patient was found to have hilar mass on CT without contrast.  Radiology recommended CT chest with contrast, however daughter declined the test as she would not further pursue any treatment if the mass was concerning. Chest x-ray evidence of increased opacity in the left base  Fever 101.4 in ED, patient was in A-fib with RVR intermittently in ED, meets sepsis criteria. Management as below. Procalcitonin 5.54. Now afebrile, remains hemodynamically stable. Now saturating mid 90s on room air at rest.  Likely secondary to pneumonia and sepsis with encephalopathy  Patient given Rocephin Zithromax in ED. continue Zosyn.   Speech eval-recommended purée with honey thick liquid, aspiration precaution  Gentle IV hydration  Xopenex as needed, chest PT as tolerated  Continue supplemental oxygen to maintain sats above 90%. Severe protein-calorie malnutrition ([address])  Assessment & Plan  Malnutrition Findings:   Adult Malnutrition type: Chronic illness  Adult Degree of Malnutrition: Other severe protein calorie malnutrition  Malnutrition Characteristics: Fat loss, Muscle loss                  360 Statement: Severe protein calorie malnutrition of chronic illness related to inadequate energy intake as evidenced by hollow orbits, depression at the temples, protruding clavicles. Treated with modified diet and supplements    BMI Findings:  Adult BMI Classifications: Underweight < 18.5        Body mass index is 18.44 kg/m². CAD (coronary artery disease)  Assessment & Plan  Status post angioplasty in 1999. On baby aspirin labetalol in nursing home. No longer taking statin. Continue baby aspirin labetalol  LDL 72    Elevated troponin  Assessment & Plan  Troponin 450->385  Initial EKG showed sinus tach with premature supraventricular complexes, rate 122, ST depression in lateral leads. Repeat EKG showed A-fib with RVR, rate 140  Likely type II MI secondary to sepsis and  tachycardia  Trend troponin  Telemetry  Echo with preserved EF, no wall motion abnormalities   Patient was started on heparin drip in ED, will continue  Consult cardiology-input appreciated  Continue medical management    Atrial fibrillation with rapid ventricular response ([address])  Assessment & Plan  In the setting of sepsis. Episodes of A-fib with RVR during hospitalization in June to July 2023 in the setting of severe sepsis per chart review. History of PACs per chart review.   Rate controlled currently  Currently on metoprolol  Telemetry  Echo - EF of 60% with no wall motion abnormality   Optimize electrolytes  On heparin drip as above  Cardiology following, input appreciated  Discussed with family regarding risk and benefits of long-term anticoagulation. No history of bleeding in the past.  History of fall previously but currently patient is bedbound. Continue anticoagulation at present for stroke prevention    Urinary retention  Assessment & Plan  Appears to be acute. Similar history in the past  Calderon inserted in ED, initial urine output 1000 mL. Bladder scan showed 800 mL prior to insertion. Continue Calderon catheter  Patient is on DuoNeb 3 times daily and every 6 hours as needed in nursing home. Will change to Xopenex as needed. Consult urology    Sepsis [facility])  Assessment & Plan  POA, as evidenced by fever, tachycardia, with suspected source of infection right lower lobe pneumonia, likely aspiration in nature. Remains afebrile, hemodynamically stable. Lactic acid normal  Procalcitonin 5.54 initially, downtrending  UA shows moderate bacteria, no white count, no nitrite, no leukocytes. Chest x-ray with evidence of pneumonia, left base  Continue IV Zosyn as above  Follow-up blood cultures, urine cultures  Gender IV hydration for 1 day  Repeat CBC, procalcitonin pending     Essential hypertension  Assessment & Plan  On lisinopril, labetalol in nursing home. BP was in 200/100s last week per staff. Currently blood pressure stable  Hold lisinopril  Labetalol was changed to metoprolol  Monitor BP closely    Severe dementia [facility])  Assessment & Plan  Nursing home resident. On puréed, nectar thick liquids in nursing home. Essentially wheelchair-bound. Incontinent of bowel and bladder. Patient talks minimally at baseline, Greenlandic-speaking only. Continue Aricept  Supportive care  Overall guarded prognosis, discussed with daughter over the phone. Advanced directives verified, patient is DNR/DNI    Acute encephalopathy  Assessment & Plan  Most likely toxic metabolic encephalopathy due to sepsis.   CT head without any acute overload, evidence of prior CVA noted  Treat underlying cause  Patient's eyes open, good eye contact, nonverbal, following simple commands. Croatian speaking at baseline. Monitor mental status             VTE Pharmacologic Prophylaxis:   Pharmacologic: Heparin Drip  Mechanical VTE Prophylaxis in Place: Yes    Patient Centered Rounds: I have performed bedside rounds with nursing staff today. Discussions with Specialists or Other Care Team Provider: Yes  Education and Discussions with Family / Patient:Yes  Time Spent for Care: 15 minutes. More than 50% of total time spent on counseling and coordination of care as described above. Current Length of Stay: 2 day(s)  Current Patient Status: Inpatient     Discharge Plan: sepsis due to PNA, requiring IV ABT     Code Status: Level 3 - DNAR and DNI      Subjective:   Patient awake and lying in bed. He is pleasantly confused. He follows simple commands, mostly nonverbal.        Objective:     Vitals:   Temp (24hrs), Av °F (36.7 °C), Min:97.3 °F (36.3 °C), Max:98.6 °F (37 °C)    Temp:  [97.3 °F (36.3 °C)-98.6 °F (37 °C)] 97.3 °F (36.3 °C)  HR:  [77-93] 77  Resp:  [17-18] 18  BP: (129-175)/(77-99) 175/91  SpO2:  [96 %-99 %] 98 %  Body mass index is 18.44 kg/m². Input and Output Summary (last 24 hours): Intake/Output Summary (Last 24 hours) at 10/20/2023 0819  Last data filed at 10/19/2023 1801  Gross per 24 hour   Intake 440 ml   Output --   Net 440 ml        Physical Exam:     Physical Exam  Vitals reviewed. Constitutional:       Appearance: He is cachectic. He is not toxic-appearing. HENT:      Head: Normocephalic. Mouth/Throat:      Mouth: Mucous membranes are moist.   Eyes:      Extraocular Movements: Extraocular movements intact. Cardiovascular:      Rate and Rhythm: Normal rate and regular rhythm. Pulmonary:      Effort: Pulmonary effort is normal. No respiratory distress. Comments: Diminished breath sounds   Abdominal:      General: Abdomen is flat. Palpations: Abdomen is soft.    Genitourinary:     Comments: Calderon catheter in place Musculoskeletal:         General: Normal range of motion. Skin:     General: Skin is warm and dry. Neurological:      Mental Status: He is alert. He is disoriented. Additional Data:     Labs:    Results from last 7 days   Lab Units 10/19/23  0443 10/18/23  2101   WBC Thousand/uL 7.73 10.36*   HEMOGLOBIN g/dL 11.5* 14.1   HEMATOCRIT % 36.2* 42.8   PLATELETS Thousands/uL 160 192     Results from last 7 days   Lab Units 10/19/23  0443 10/18/23  2101   SODIUM mmol/L 146 142   POTASSIUM mmol/L 3.7 4.3   CHLORIDE mmol/L 114* 107   CO2 mmol/L 24 25   BUN mg/dL 33* 37*   CREATININE mg/dL 0.81 1.05   CALCIUM mg/dL 8.8 9.7   TOTAL BILIRUBIN mg/dL  --  0.90   ALK PHOS U/L  --  61   ALT U/L  --  8   AST U/L  --  15     Results from last 7 days   Lab Units 10/18/23  2101   INR  1.30*         Lab Results   Component Value Date/Time    HGBA1C 5.5 10/18/2023 09:01 PM    HGBA1C 6.3 (H) 11/05/2020 07:20 AM         Results from last 7 days   Lab Units 10/19/23  0443 10/18/23  2101   LACTIC ACID mmol/L  --  1.6   PROCALCITONIN ng/ml 3.86* 5.54*       * I Have Reviewed All Lab Data Listed Above. * Additional Pertinent Lab Tests Reviewed: All Labs Within Last 24 Hours Reviewed    Imaging:     CT head wo contrast   Final Result by Alice Hoffmann MD (10/19 8653)      No acute intracranial abnormality. Workstation performed: [id number]         XR chest 1 view portable   Final Result by Dania Booth MD (10/19 8052)      Slightly increased opacity in the left base, question recurrent pneumonia or postinfectious scar from previous pneumonia. Workstation performed: [id number]           Imaging Reports Reviewed by myself    Cultures:   Blood Culture:   Lab Results   Component Value Date    BLOODCX Received in Microbiology Lab. Culture in Progress. 10/18/2023    BLOODCX Received in Microbiology Lab. Culture in Progress. 10/18/2023    BLOODCX No Growth After 5 Days.  06/30/2023 BLOODCX No Growth After 5 Days. 06/30/2023     Urine Culture:   Lab Results   Component Value Date    URINECX No Growth <1000 cfu/mL 10/18/2023    URINECX >100,000 cfu/ml Proteus mirabilis (A) 06/30/2023     Sputum Culture: No components found for: "SPUTUMCX"  Wound Culture: No results found for: "WOUNDCULT"    Last 24 Hours Medication List:   Current Facility-Administered Medications   Medication Dose Route Frequency Provider Last Rate    acetaminophen  650 mg Oral Q6H PRN Cuiyin Yurik, CRNP      aspirin  81 mg Oral Daily Cuiyin Yurik, CRNP      donepezil  5 mg Oral HS Cuiyin Yurik, CRNP      famotidine  20 mg Intravenous Q24H [address] Cuiyin Yurik, CRNP      glycerin-hypromellose-  1 drop Both Eyes BID Cuiyin Yurik, CRNP      heparin (porcine)  3-20 Units/kg/hr (Order-Specific) Intravenous Titrated Cuishaheen Yurik, CRNP 20 Units/kg/hr (10/20/23 0451)    heparin (porcine)  1,800 Units Intravenous Q6H PRN Cuiyin Yurik, CRNP      heparin (porcine)  3,600 Units Intravenous Q6H PRN Cuiyin Yurik, CRNP      levalbuterol  0.63 mg Nebulization Q4H PRN Cuiyin Yurik, CRNP      levothyroxine  50 mcg Oral Early Morning Cuiyin Yurik, CRNP      metoprolol tartrate  25 mg Oral Q12H [address] [facility], CRNP      nitroglycerin  0.4 mg Sublingual Q5 Min PRN Cuiyin Yurik, CRNP      ondansetron  4 mg Intravenous Q6H PRN Cuiyin Yurik, CRNP      piperacillin-tazobactam  3.375 g Intravenous Q6H Cuiyin Yurik, CRNP      saccharomyces boulardii  250 mg Oral BID Cuiyin Yurik, CRNP      senna  1 tablet Oral BID Cuiyin Yurik, CRNP      tamsulosin  0.4 mg Oral Daily With [address], NAHUN          Today, Patient Was Seen By: NAHUN Machado    ** Please Note: Dragon 360 Dictation voice to text software may have been used in the creation of this document.  **

## 2023-10-20 NOTE — CASE MANAGEMENT
Case Management Discharge Planning Note    Patient name Geovany Johnson  Location [address] [facility] [address] 12-* MRN [id number]  : 1940 Date 10/20/2023       Current Admission Date: 10/18/2023  Current Admission Diagnosis:Acute respiratory failure with hypoxia [facility])   Patient Active Problem List    Diagnosis Date Noted    CAD (coronary artery disease) 10/19/2023    Severe protein-calorie malnutrition ([address]) 10/19/2023    Sepsis ([address]) 10/18/2023    Urinary retention 10/18/2023    Atrial fibrillation with rapid ventricular response ([address]) 10/18/2023    Elevated troponin 10/18/2023    Acute respiratory failure with hypoxia ([address]) 2023    Acute encephalopathy 2023    Severe dementia ([address]) 2023    Essential hypertension 2023    Hilar mass 2023    Goals of care, counseling/discussion 2023      LOS (days): 2  Geometric Mean LOS (GMLOS) (days): 5.10  Days to GMLOS:3.4     OBJECTIVE:  Risk of Unplanned Readmission Score: 18.07       Current admission status: Inpatient   Preferred Pharmacy:   PATIENT/FAMILY REPORTS NO PREFERRED PHARMACY  No address on file    Primary Care Provider: Nidia Gregg DO    Primary Insurance: MEDICARE  Secondary Insurance: [address]     DISCHARGE DETAILS:      IMM Given (Date):: 10/20/23  IMM Given to[de-identified] Family (IMM reviewed by phone with daughter Clearence Media and she verbalized acknowledgement.   Copy mailed to [employer] and copy placed in scan bin for chart.)

## 2023-10-21 PROBLEM — E87.0 HYPERNATREMIA: Status: ACTIVE | Noted: 2023-10-21

## 2023-10-21 LAB
ANION GAP SERPL CALCULATED.3IONS-SCNC: 5 MMOL/L
ANION GAP SERPL CALCULATED.3IONS-SCNC: 7 MMOL/L
BUN SERPL-MCNC: 16 MG/DL (ref 5–25)
BUN SERPL-MCNC: 19 MG/DL (ref 5–25)
CALCIUM SERPL-MCNC: 8.7 MG/DL (ref 8.4–10.2)
CALCIUM SERPL-MCNC: 8.9 MG/DL (ref 8.4–10.2)
CHLORIDE SERPL-SCNC: 115 MMOL/L (ref 96–108)
CHLORIDE SERPL-SCNC: 118 MMOL/L (ref 96–108)
CO2 SERPL-SCNC: 26 MMOL/L (ref 21–32)
CO2 SERPL-SCNC: 28 MMOL/L (ref 21–32)
CREAT SERPL-MCNC: 0.78 MG/DL (ref 0.6–1.3)
CREAT SERPL-MCNC: 0.79 MG/DL (ref 0.6–1.3)
ERYTHROCYTE [DISTWIDTH] IN BLOOD BY AUTOMATED COUNT: 14.6 % (ref 11.6–15.1)
GFR SERPL CREATININE-BSD FRML MDRD: 83 ML/MIN/1.73SQ M
GFR SERPL CREATININE-BSD FRML MDRD: 84 ML/MIN/1.73SQ M
GLUCOSE SERPL-MCNC: 138 MG/DL (ref 65–140)
GLUCOSE SERPL-MCNC: 95 MG/DL (ref 65–140)
HCT VFR BLD AUTO: 34.8 % (ref 36.5–49.3)
HGB BLD-MCNC: 11.1 G/DL (ref 12–17)
MCH RBC QN AUTO: 29.1 PG (ref 26.8–34.3)
MCHC RBC AUTO-ENTMCNC: 31.9 G/DL (ref 31.4–37.4)
MCV RBC AUTO: 91 FL (ref 82–98)
PLATELET # BLD AUTO: 169 THOUSANDS/UL (ref 149–390)
PMV BLD AUTO: 11.1 FL (ref 8.9–12.7)
POTASSIUM SERPL-SCNC: 3.3 MMOL/L (ref 3.5–5.3)
POTASSIUM SERPL-SCNC: 3.4 MMOL/L (ref 3.5–5.3)
RBC # BLD AUTO: 3.81 MILLION/UL (ref 3.88–5.62)
SODIUM SERPL-SCNC: 148 MMOL/L (ref 135–147)
SODIUM SERPL-SCNC: 151 MMOL/L (ref 135–147)
WBC # BLD AUTO: 5.79 THOUSAND/UL (ref 4.31–10.16)

## 2023-10-21 PROCEDURE — 99232 SBSQ HOSP IP/OBS MODERATE 35: CPT | Performed by: INTERNAL MEDICINE

## 2023-10-21 PROCEDURE — 80048 BASIC METABOLIC PNL TOTAL CA: CPT | Performed by: NURSE PRACTITIONER

## 2023-10-21 PROCEDURE — 99232 SBSQ HOSP IP/OBS MODERATE 35: CPT | Performed by: NURSE PRACTITIONER

## 2023-10-21 PROCEDURE — 85027 COMPLETE CBC AUTOMATED: CPT | Performed by: INTERNAL MEDICINE

## 2023-10-21 PROCEDURE — 80048 BASIC METABOLIC PNL TOTAL CA: CPT | Performed by: INTERNAL MEDICINE

## 2023-10-21 RX ORDER — POTASSIUM CHLORIDE 20 MEQ/1
40 TABLET, EXTENDED RELEASE ORAL 2 TIMES DAILY
Status: COMPLETED | OUTPATIENT
Start: 2023-10-21 | End: 2023-10-21

## 2023-10-21 RX ORDER — DEXTROSE MONOHYDRATE 50 MG/ML
50 INJECTION, SOLUTION INTRAVENOUS CONTINUOUS
Status: DISPENSED | OUTPATIENT
Start: 2023-10-21 | End: 2023-10-21

## 2023-10-21 RX ORDER — NIFEDIPINE 30 MG/1
30 TABLET, EXTENDED RELEASE ORAL DAILY
Status: DISCONTINUED | OUTPATIENT
Start: 2023-10-21 | End: 2023-10-24 | Stop reason: HOSPADM

## 2023-10-21 RX ADMIN — SENNOSIDES 8.6 MG: 8.6 TABLET, FILM COATED ORAL at 17:02

## 2023-10-21 RX ADMIN — FAMOTIDINE 20 MG: 10 INJECTION, SOLUTION INTRAVENOUS at 09:06

## 2023-10-21 RX ADMIN — LOSARTAN POTASSIUM 50 MG: 50 TABLET, FILM COATED ORAL at 12:57

## 2023-10-21 RX ADMIN — METOPROLOL TARTRATE 25 MG: 25 TABLET, FILM COATED ORAL at 22:43

## 2023-10-21 RX ADMIN — LEVOTHYROXINE SODIUM 50 MCG: 50 TABLET ORAL at 05:45

## 2023-10-21 RX ADMIN — ASPIRIN 81 MG CHEWABLE TABLET 81 MG: 81 TABLET CHEWABLE at 12:57

## 2023-10-21 RX ADMIN — POTASSIUM CHLORIDE 40 MEQ: 1500 TABLET, EXTENDED RELEASE ORAL at 17:03

## 2023-10-21 RX ADMIN — GLYCERIN, HYPROMELLOSE, POLYETHYLENE GLYCOL 1 DROP: .2; .2; 1 LIQUID OPHTHALMIC at 12:57

## 2023-10-21 RX ADMIN — SENNOSIDES 8.6 MG: 8.6 TABLET, FILM COATED ORAL at 12:57

## 2023-10-21 RX ADMIN — TAMSULOSIN HYDROCHLORIDE 0.4 MG: 0.4 CAPSULE ORAL at 17:03

## 2023-10-21 RX ADMIN — POTASSIUM CHLORIDE 40 MEQ: 1500 TABLET, EXTENDED RELEASE ORAL at 12:57

## 2023-10-21 RX ADMIN — DONEPEZIL HYDROCHLORIDE 5 MG: 5 TABLET ORAL at 21:39

## 2023-10-21 RX ADMIN — Medication 250 MG: at 17:03

## 2023-10-21 RX ADMIN — PIPERACILLIN AND TAZOBACTAM 3.38 G: 36; 4.5 INJECTION, POWDER, FOR SOLUTION INTRAVENOUS at 16:22

## 2023-10-21 RX ADMIN — PIPERACILLIN AND TAZOBACTAM 3.38 G: 36; 4.5 INJECTION, POWDER, FOR SOLUTION INTRAVENOUS at 03:34

## 2023-10-21 RX ADMIN — PIPERACILLIN AND TAZOBACTAM 3.38 G: 36; 4.5 INJECTION, POWDER, FOR SOLUTION INTRAVENOUS at 09:02

## 2023-10-21 RX ADMIN — GLYCERIN, HYPROMELLOSE, POLYETHYLENE GLYCOL 1 DROP: .2; .2; 1 LIQUID OPHTHALMIC at 17:03

## 2023-10-21 RX ADMIN — Medication 250 MG: at 12:57

## 2023-10-21 RX ADMIN — APIXABAN 2.5 MG: 2.5 TABLET, FILM COATED ORAL at 17:03

## 2023-10-21 RX ADMIN — NIFEDIPINE 30 MG: 30 TABLET, EXTENDED RELEASE ORAL at 12:58

## 2023-10-21 RX ADMIN — PIPERACILLIN AND TAZOBACTAM 3.38 G: 36; 4.5 INJECTION, POWDER, FOR SOLUTION INTRAVENOUS at 21:38

## 2023-10-21 RX ADMIN — DEXTROSE 50 ML/HR: 5 SOLUTION INTRAVENOUS at 09:19

## 2023-10-21 RX ADMIN — METOPROLOL TARTRATE 25 MG: 25 TABLET, FILM COATED ORAL at 12:57

## 2023-10-21 RX ADMIN — APIXABAN 2.5 MG: 2.5 TABLET, FILM COATED ORAL at 12:57

## 2023-10-21 NOTE — ASSESSMENT & PLAN NOTE
Appears to be acute. Similar history in the past  Márquez inserted in ED, initial urine output 1000 mL. Bladder scan showed 800 mL prior to insertion. Continue Mráquez catheter  Patient is on DuoNeb 3 times daily and every 6 hours as needed in nursing home. Will change to Xopenex as needed.   Urology consultation appreciated - márquez draining to bedside bag

## 2023-10-21 NOTE — PROGRESS NOTES
Progress Note - Urology      Patient: Pelon Jenkins   : 1940 Sex: male   MRN: [id number]     CSN: [id number]  Unit/Bed#: [address] [place] [address]     SUBJECTIVE:   Patient seen on morning rounds  No family members present no urologic history could be obtained urine draining clear      Objective   Vitals: /87   Pulse 74   Temp 97.7 °F (36.5 °C)   Resp 18   Ht 5' 7" (1.702 m)   Wt 53.4 kg (117 lb 11.6 oz)   SpO2 94%   BMI 18.44 kg/m²     I/O last 24 hours:   In: 56 [P.O.:480; I.V.:10]  Out: 800 [Urine:800]      Physical Exam:   General flat affect  Normocephalic atraumatic PERRLA  Lungs clear bilaterally  Cardiac normal S1 normal S2  Abdomen soft, flank pain  Extremities no edema      Lab Results: CBC:   Lab Results   Component Value Date    WBC 5.79 10/21/2023    HGB 11.1 (L) 10/21/2023    HCT 34.8 (L) 10/21/2023    MCV 91 10/21/2023     10/21/2023    RBC 3.81 (L) 10/21/2023    MCH 29.1 10/21/2023    MCHC 31.9 10/21/2023    RDW 14.6 10/21/2023    MPV 11.1 10/21/2023    NRBC 0 2023     CMP:   Lab Results   Component Value Date     (H) 10/21/2023    CO2 26 10/21/2023    BUN 19 10/21/2023    CREATININE 0.79 10/21/2023    CALCIUM 8.7 10/21/2023    AST 15 10/18/2023    ALT 8 10/18/2023    ALKPHOS 61 10/18/2023    EGFR 83 10/21/2023     Urinalysis:   Lab Results   Component Value Date    COLORU Yellow 10/18/2023    CLARITYU Clear 10/18/2023    SPECGRAV 1.020 10/18/2023    PHUR 6.0 10/18/2023    LEUKOCYTESUR Negative 10/18/2023    NITRITE Negative 10/18/2023    GLUCOSEU Negative 10/18/2023    KETONESU Negative 10/18/2023    BILIRUBINUR Negative 10/18/2023    BLOODU Negative 10/18/2023     Urine Culture:   Lab Results   Component Value Date    URINECX No Growth <1000 cfu/mL 10/18/2023     PSA: No results found for: "PSA"      Assessment/ Plan:  Urinary retention  Continue Calderon catheter  Not do voiding trial to get adequate history from family members if possible        Shy Reeves MD

## 2023-10-21 NOTE — ASSESSMENT & PLAN NOTE
Nursing home resident. On puréed, nectar thick liquids in nursing home. Essentially wheelchair-bound. Incontinent of bowel and bladder. Patient talks minimally at baseline, Nepali-speaking only. Continue Aricept  Supportive care  Overall guarded prognosis, discussed with daughter over the phone.   Advanced directives verified, patient is DNR/DNI

## 2023-10-21 NOTE — PROGRESS NOTES
[address] [facility]  Progress Note  Name: Yocasta Mariano  MRN: [id number]  Unit/Bed#: 4 [address]  Date of Admission: 10/18/2023   Date of Service: 10/21/2023 I Hospital Day: 3    Assessment/Plan   * Acute respiratory failure with hypoxia [facility])  Assessment & Plan  Patient presents with hypoxia, SPO2 in 80s on room air since yesterday in nursing home, was placed on O2 5 L in nursing home to get SPO2 above 90%. Per staff, patient has not been eating or drinking much for at least 2 days, having cough with nectar thick liquids, having audible wheezing, not responding as he normally does for at least 2 days. At baseline patient is able to talk a few words, able to feed self, able to follow simple directions and able to do puzzle and stack blocks per staff. Patient has not been able to do all of the above for about one week. Had a chest x-ray yesterday in nursing home which was unremarkable. Patient was given Levaquin 500mg p.o. prior to coming to ED. No fever in nursing home per staff. Chart reviewed. Patient was hospitalized between 6/30-7/4/2023 for severe sepsis secondary to left-sided pneumonia, acute hypoxic respiratory failure, UTI. Patient was treated with cefepime and cefdinir for 7 days total.  Patient was found to have hilar mass on CT without contrast.  Radiology recommended CT chest with contrast, however daughter declined the test as she would not further pursue any treatment if the mass was concerning. Chest x-ray evidence of increased opacity in the left base  Fever 101.4 in ED, patient was in A-fib with RVR intermittently in ED, meets sepsis criteria. Management as below. Procalcitonin 5.54. Now afebrile, remains hemodynamically stable. Now saturating mid 90s on room air at rest.  Likely secondary to pneumonia and sepsis with encephalopathy  Patient given Rocephin Zithromax in ED. continue Zosyn.   Speech eval-recommended purée with honey thick liquid, aspiration precaution  Gentle IV hydration  Xopenex as needed, chest PT as tolerated  Continue supplemental oxygen to maintain sats above 90%. Hypernatremia  Assessment & Plan  Na 151; had been trending up   Will do gentle IVF with D5 for 12 hours  Repeat BMP this afternoon shows Na 148  Suspect secondary to free water deficit  Repeat in am    Sepsis [facility])  Assessment & Plan  POA, as evidenced by fever, tachycardia, with suspected source of infection right lower lobe pneumonia, likely aspiration in nature. Remains afebrile, hemodynamically stable. Lactic acid normal  Procalcitonin 5.54 initially, downtrending  UA shows moderate bacteria, no white count, no nitrite, no leukocytes. Chest x-ray with evidence of pneumonia, left base  Continue IV Zosyn as above  Follow-up blood cultures, urine cultures  Repeat CBC, procalcitonin pending     Atrial fibrillation with rapid ventricular response (HCC)  Assessment & Plan  In the setting of sepsis. Episodes of A-fib with RVR during hospitalization in June to July 2023 in the setting of severe sepsis per chart review. History of PACs per chart review. Rate controlled currently  Currently on metoprolol  Echo - EF of 60% with no wall motion abnormality   Optimize electrolytes  Had been on heparin drip   Cardiology following, input appreciated  Discussed with family regarding risk and benefits of long-term anticoagulation. No history of bleeding in the past.  History of fall previously but currently patient is bedbound. Changed to Eliquis for stroke prevention     Acute encephalopathy  Assessment & Plan  Most likely toxic metabolic encephalopathy due to sepsis.   CT head without any acute overload, evidence of prior CVA noted  Treat underlying cause  Patient intermittently lethargic with periods of alertness   Monitor mental status    Elevated troponin  Assessment & Plan  Troponin 450->385  Initial EKG showed sinus tach with premature supraventricular complexes, rate 122, ST depression in lateral leads. Repeat EKG showed A-fib with RVR, rate 140  Likely type II MI secondary to sepsis and  tachycardia  Trend troponin  Echo with preserved EF, no wall motion abnormalities   Patient was started on heparin drip in ED, transitioned to oral Eliquis   Consult cardiology-input appreciated  Continue medical management    Severe dementia [facility])  Assessment & Plan  Nursing home resident. On puréed, nectar thick liquids in nursing home. Essentially wheelchair-bound. Incontinent of bowel and bladder. Patient talks minimally at baseline, Maltese-speaking only. Continue Aricept  Supportive care  Overall guarded prognosis, discussed with daughter over the phone. Advanced directives verified, patient is DNR/DNI    Urinary retention  Assessment & Plan  Appears to be acute. Similar history in the past  Márquez inserted in ED, initial urine output 1000 mL. Bladder scan showed 800 mL prior to insertion. Continue Márquez catheter  Patient is on DuoNeb 3 times daily and every 6 hours as needed in nursing home. Will change to Xopenex as needed. Urology consultation appreciated - márquez draining to bedside bag     CAD (coronary artery disease)  Assessment & Plan  Status post angioplasty in 1999. On baby aspirin labetalol in nursing home. No longer taking statin. Continue baby aspirin labetalol  LDL 72    Essential hypertension  Assessment & Plan  On lisinopril, labetalol in nursing home. BP was in 200/100s last week per staff.   Currently blood pressure stable  Hold lisinopril  Labetalol was changed to metoprolol  Monitor BP closely    Severe protein-calorie malnutrition (HCC)  Assessment & Plan  Malnutrition Findings:   Adult Malnutrition type: Chronic illness  Adult Degree of Malnutrition: Other severe protein calorie malnutrition  Malnutrition Characteristics: Fat loss, Muscle loss         Nutritional supplements as able          360 Statement: Severe protein calorie malnutrition of chronic illness related to inadequate energy intake as evidenced by hollow orbits, depression at the temples, protruding clavicles. Treated with modified diet and supplements    BMI Findings:  Adult BMI Classifications: Underweight < 18.5        Body mass index is 18.44 kg/m². VTE Pharmacologic Prophylaxis:   High Risk (Score >/= 5) - Pharmacological DVT Prophylaxis Ordered: apixaban (Eliquis). Sequential Compression Devices Ordered. Patient Centered Rounds: I performed bedside rounds with nursing staff today. Discussions with Specialists or Other Care Team Provider: case management     Education and Discussions with Family / Patient: Updated  (daughter) via phone. Total Time Spent on Date of Encounter in care of patient: greater than 45 mins. This time was spent on one or more of the following: performing physical exam; counseling and coordination of care; obtaining or reviewing history; documenting in the medical record; reviewing/ordering tests, medications or procedures; communicating with other healthcare professionals and discussing with patient's family/caregivers. Current Length of Stay: 3 day(s)  Current Patient Status: Inpatient   Certification Statement: The patient will continue to require additional inpatient hospital stay due to continued IV abx, repeat labs, márquez cath   Discharge Plan: Anticipate discharge in 48-72 hrs to rehab facility. Code Status: Level 3 - DNAR and DNI    Subjective:   Patient seen laying in bed, sleeping, does open eyes when spoken to loudly in [id number] [place] [address] [address] When questioned he indicated he was tired and returned to sleep. Objective:     Vitals:   Temp (24hrs), Av.7 °F (36.5 °C), Min:97.2 °F (36.2 °C), Max:98 °F (36.7 °C)    Temp:  [97.2 °F (36.2 °C)-98 °F (36.7 °C)] 97.9 °F (36.6 °C)  HR:  [48-74] 48  Resp:  [18-24] 18  BP: (120-170)/(75-89) 139/78  SpO2:  [94 %-98 %] 98 %  Body mass index is 18.44 kg/m².      Input and Output Summary (last 24 hours): Intake/Output Summary (Last 24 hours) at 10/21/2023 1647  Last data filed at 10/21/2023 0600  Gross per 24 hour   Intake 250 ml   Output 800 ml   Net -550 ml       Physical Exam:   Physical Exam  Vitals reviewed. Constitutional:       Comments: Thin, cachetic appearing gentleman lethargic in bed; does open eyes when spoken to loudly in Bangladeshi; tired appearing    HENT:      Head: Normocephalic. Nose: Nose normal.      Mouth/Throat:      Mouth: Mucous membranes are dry. Eyes:      Conjunctiva/sclera: Conjunctivae normal.   Cardiovascular:      Rate and Rhythm: Normal rate. Rhythm irregular. Heart sounds: Murmur heard. Pulmonary:      Comments: Diminished at the bases   Abdominal:      General: Bowel sounds are normal. There is no distension. Palpations: Abdomen is soft. Tenderness: There is no abdominal tenderness. Genitourinary:     Comments: Calderon present clear yellow urine   Musculoskeletal:      Cervical back: Normal range of motion. Right lower leg: No edema. Left lower leg: No edema. Comments: Generalized deconditioning    Skin:     General: Skin is warm and dry. Capillary Refill: Capillary refill takes less than 2 seconds. Neurological:      Comments: Lethargic, opens eyes briefly when spoken to loudly in Bangladeshi    Psychiatric:      Comments: Lethargic. Additional Data:     Labs:  Results from last 7 days   Lab Units 10/21/23  0527 10/19/23  0443 10/18/23  2101   WBC Thousand/uL 5.79   < > 10.36*   HEMOGLOBIN g/dL 11.1*   < > 14.1   HEMATOCRIT % 34.8*   < > 42.8   PLATELETS Thousands/uL 169   < > 192   BANDS PCT %  --   --  8   LYMPHO PCT %  --   --  6*   MONO PCT %  --   --  7   EOS PCT %  --   --  0    < > = values in this interval not displayed.      Results from last 7 days   Lab Units 10/21/23  1546 10/19/23  0443 10/18/23  2101   SODIUM mmol/L 148*   < > 142   POTASSIUM mmol/L 3.3*   < > 4.3   CHLORIDE mmol/L 115*   < > 107   CO2 mmol/L 28   < > 25   BUN mg/dL 16   < > 37*   CREATININE mg/dL 0.78   < > 1.05   ANION GAP mmol/L 5   < > 10   CALCIUM mg/dL 8.9   < > 9.7   ALBUMIN g/dL  --   --  3.5   TOTAL BILIRUBIN mg/dL  --   --  0.90   ALK PHOS U/L  --   --  61   ALT U/L  --   --  8   AST U/L  --   --  15   GLUCOSE RANDOM mg/dL 138   < > 143*    < > = values in this interval not displayed. Results from last 7 days   Lab Units 10/18/23  2101   INR  1.30*         Results from last 7 days   Lab Units 10/18/23  2101   HEMOGLOBIN A1C % 5.5     Results from last 7 days   Lab Units 10/20/23  0949 10/19/23  0443 10/18/23  2101   LACTIC ACID mmol/L  --   --  1.6   PROCALCITONIN ng/ml 1.84* 3.86* 5.54*       Lines/Drains:  Invasive Devices       Peripheral Intravenous Line  Duration             Peripheral IV 10/18/23 Left Antecubital 2 days    Peripheral IV 10/18/23 Right;Ventral (anterior) Hand 2 days              Drain  Duration             Urethral Catheter Non-latex 12 Fr. 2 days                  Urinary Catheter:  Goal for removal: Voiding trial when ambulation improves               Imaging: No pertinent imaging reviewed. Recent Cultures (last 7 days):   Results from last 7 days   Lab Units 10/18/23  2101 10/18/23  1707   BLOOD CULTURE  No Growth at 48 hrs. No Growth at 48 hrs.   --    URINE CULTURE   --  No Growth <1000 cfu/mL       Last 24 Hours Medication List:   Current Facility-Administered Medications   Medication Dose Route Frequency Provider Last Rate    acetaminophen  650 mg Oral Q6H PRN NAHUN Mead      apixaban  2.5 mg Oral BID NAHUN Lemons      aspirin  81 mg Oral Daily NAHUN Mead      dextrose  50 mL/hr Intravenous Continuous MelissaNAHUN Riggs 50 mL/hr (10/21/23 0919)    donepezil  5 mg Oral HS NAHUN Mead      famotidine  20 mg Intravenous Q24H [address] NAHUN Mead      glycerin-hypromellose-  1 drop Both Eyes BID NAHUN Mead      levalbuterol  0.63 mg Nebulization Q4H PRN Brandon Odor, CRNP      levothyroxine  50 mcg Oral Early Morning Cuishaheen Sheikhk, CRNP      losartan  50 mg Oral Daily Linard Call, CRNP      metoprolol tartrate  25 mg Oral Q12H [facility] Linard Call, CRNP      NIFEdipine  30 mg Oral Daily Linard Call, CRNP      nitroglycerin  0.4 mg Sublingual Q5 Min PRN Radha Gillespie, CRNP      ondansetron  4 mg Intravenous Q6H PRN Radha Gillespie, CRNP      piperacillin-tazobactam  3.375 g Intravenous Q6H Cuishaheen Sheikhk, CRNP      potassium chloride  40 mEq Oral BID Linard Call, CRNP      saccharomyces boulardii  250 mg Oral BID Cuishaheen Sheikhk, CRNP      senna  1 tablet Oral BID Radha Gillespie, CRNP      tamsulosin  0.4 mg Oral Daily With [address], CRNP          Today, Patient Was Seen By: NAHUN Martinez    **Please Note: This note may have been constructed using a voice recognition system. **

## 2023-10-21 NOTE — ASSESSMENT & PLAN NOTE
Malnutrition Findings:   Adult Malnutrition type: Chronic illness  Adult Degree of Malnutrition: Other severe protein calorie malnutrition  Malnutrition Characteristics: Fat loss, Muscle loss         Nutritional supplements as able          360 Statement: Severe protein calorie malnutrition of chronic illness related to inadequate energy intake as evidenced by hollow orbits, depression at the temples, protruding clavicles. Treated with modified diet and supplements    BMI Findings:  Adult BMI Classifications: Underweight < 18.5        Body mass index is 18.44 kg/m².

## 2023-10-21 NOTE — ASSESSMENT & PLAN NOTE
POA, as evidenced by fever, tachycardia, with suspected source of infection right lower lobe pneumonia, likely aspiration in nature. Remains afebrile, hemodynamically stable. Lactic acid normal  Procalcitonin 5.54 initially, downtrending  UA shows moderate bacteria, no white count, no nitrite, no leukocytes.    Chest x-ray with evidence of pneumonia, left base  Continue IV Zosyn as above  Follow-up blood cultures, urine cultures  Repeat CBC, procalcitonin pending

## 2023-10-21 NOTE — ASSESSMENT & PLAN NOTE
Most likely toxic metabolic encephalopathy due to sepsis.   CT head without any acute overload, evidence of prior CVA noted  Treat underlying cause  Patient intermittently lethargic with periods of alertness   Monitor mental status

## 2023-10-21 NOTE — PLAN OF CARE
Problem: PAIN - ADULT  Goal: Verbalizes/displays adequate comfort level or baseline comfort level  Description: Interventions:  - Encourage patient to monitor pain and request assistance  - Assess pain using appropriate pain scale  - Administer analgesics based on type and severity of pain and evaluate response  - Implement non-pharmacological measures as appropriate and evaluate response  - Consider cultural and social influences on pain and pain management  - Notify physician/advanced practitioner if interventions unsuccessful or patient reports new pain  10/21/2023 1053 by Gustavo Garcia RN  Outcome: Progressing  10/21/2023 1052 by Gustavo Garcia RN  Outcome: Progressing     Problem: INFECTION - ADULT  Goal: Absence or prevention of progression during hospitalization  Description: INTERVENTIONS:  - Assess and monitor for signs and symptoms of infection  - Monitor lab/diagnostic results  - Monitor all insertion sites, i.e. indwelling lines, tubes, and drains  - Monitor endotracheal if appropriate and nasal secretions for changes in amount and color  - [place] appropriate cooling/warming therapies per order  - Administer medications as ordered  - Instruct and encourage patient and family to use good hand hygiene technique  - Identify and instruct in appropriate isolation precautions for identified infection/condition  10/21/2023 1053 by Gustavo Garcia RN  Outcome: Progressing  10/21/2023 1052 by Gustavo Garcia RN  Outcome: Progressing  Goal: Absence of fever/infection during neutropenic period  Description: INTERVENTIONS:  - Monitor WBC    10/21/2023 1053 by Gustavo Garcia RN  Outcome: Progressing  10/21/2023 1052 by Gustavo Garcia RN  Outcome: Progressing     Problem: SAFETY ADULT  Goal: Patient will remain free of falls  Description: INTERVENTIONS:  - Educate patient/family on patient safety including physical limitations  - Instruct patient to call for assistance with activity   - Consult OT/PT to assist with strengthening/mobility   - Keep Call bell within reach  - Keep bed low and locked with side rails adjusted as appropriate  - Keep care items and personal belongings within reach  - Initiate and maintain comfort rounds  - Make Fall Risk Sign visible to staff  - Offer Toileting every 2 Hours, in advance of need  - Initiate/Maintain bed alarm  - Obtain necessary fall risk management equipment: yes  - Apply yellow socks and bracelet for high fall risk patients  - Consider moving patient to room near nurses station  10/21/2023 1053 by Elena Garcia RN  Outcome: Progressing  10/21/2023 1052 by Elena Garcia RN  Outcome: Progressing  Goal: Maintain or return to baseline ADL function  Description: INTERVENTIONS:  -  Assess patient's ability to carry out ADLs; assess patient's baseline for ADL function and identify physical deficits which impact ability to perform ADLs (bathing, care of mouth/teeth, toileting, grooming, dressing, etc.)  - Assess/evaluate cause of self-care deficits   - Assess range of motion  - Assess patient's mobility; develop plan if impaired  - Assess patient's need for assistive devices and provide as appropriate  - Encourage maximum independence but intervene and supervise when necessary  - Involve family in performance of ADLs  - Assess for home care needs following discharge   - Consider OT consult to assist with ADL evaluation and planning for discharge  - Provide patient education as appropriate  10/21/2023 1053 by Elena Garcia RN  Outcome: Progressing  10/21/2023 1052 by Elena Garcia RN  Outcome: Progressing  Goal: Maintains/Returns to pre admission functional level  Description: INTERVENTIONS:  - Perform BMAT or MOVE assessment daily.   - Set and communicate daily mobility goal to care team and patient/family/caregiver. - Collaborate with rehabilitation services on mobility goals if consulted  - Perform Range of Motion 3 times a day. - Reposition patient every 2 hours.   - Dangle patient 3 times a day  - Stand patient 3 times a day  - Ambulate patient 3 times a day  - Out of bed to chair 3 times a day   - Out of bed for meals 3 times a day  - Out of bed for toileting  - Record patient progress and toleration of activity level   10/21/2023 1053 by Agnieszka Hannon RN  Outcome: Progressing  10/21/2023 1052 by Agnieszka Hannon RN  Outcome: Progressing     Problem: DISCHARGE PLANNING  Goal: Discharge to home or other facility with appropriate resources  Description: INTERVENTIONS:  - Identify barriers to discharge w/patient and caregiver  - Arrange for needed discharge resources and transportation as appropriate  - Identify discharge learning needs (meds, wound care, etc.)  - Arrange for interpretive services to assist at discharge as needed  - Refer to Case Management Department for coordinating discharge planning if the patient needs post-hospital services based on physician/advanced practitioner order or complex needs related to functional status, cognitive ability, or social support system  10/21/2023 1053 by Agnieszka Hannon RN  Outcome: Progressing  10/21/2023 1052 by Agnieszka Hannon RN  Outcome: Progressing     Problem: Knowledge Deficit  Goal: Patient/family/caregiver demonstrates understanding of disease process, treatment plan, medications, and discharge instructions  Description: Complete learning assessment and assess knowledge base.   Interventions:  - Provide teaching at level of understanding  - Provide teaching via preferred learning methods  10/21/2023 1053 by Agnieszka Hannon RN  Outcome: Progressing  10/21/2023 1052 by Agnieszka Hannon RN  Outcome: Progressing     Problem: CARDIOVASCULAR - ADULT  Goal: Maintains optimal cardiac output and hemodynamic stability  Description: INTERVENTIONS:  - Monitor I/O, vital signs and rhythm  - Monitor for S/S and trends of decreased cardiac output  - Administer and titrate ordered vasoactive medications to optimize hemodynamic stability  - Assess quality of pulses, skin color and temperature  - Assess for signs of decreased coronary artery perfusion  - Instruct patient to report change in severity of symptoms  10/21/2023 1053 by Leta Dejesus RN  Outcome: Progressing  10/21/2023 1052 by Leta Dejesus RN  Outcome: Progressing  Goal: Absence of cardiac dysrhythmias or at baseline rhythm  Description: INTERVENTIONS:  - Continuous cardiac monitoring, vital signs, obtain 12 lead EKG if ordered  - Administer antiarrhythmic and heart rate control medications as ordered  - Monitor electrolytes and administer replacement therapy as ordered  10/21/2023 1053 by Leta Dejesus RN  Outcome: Progressing  10/21/2023 1052 by Leta Dejesus RN  Outcome: Progressing     Problem: RESPIRATORY - ADULT  Goal: Achieves optimal ventilation and oxygenation  Description: INTERVENTIONS:  - Assess for changes in respiratory status  - Assess for changes in mentation and behavior  - Position to facilitate oxygenation and minimize respiratory effort  - Oxygen administered by appropriate delivery if ordered  - Initiate smoking cessation education as indicated  - Encourage broncho-pulmonary hygiene including cough, deep breathe, Incentive Spirometry  - Assess the need for suctioning and aspirate as needed  - Assess and instruct to report SOB or any respiratory difficulty  - Respiratory Therapy support as indicated  10/21/2023 1053 by Leta Dejesus RN  Outcome: Progressing  10/21/2023 1052 by Leta Dejesus RN  Outcome: Progressing     Problem: GASTROINTESTINAL - ADULT  Goal: Maintains adequate nutritional intake  Description: INTERVENTIONS:  - Monitor percentage of each meal consumed  - Identify factors contributing to decreased intake, treat as appropriate  - Assist with meals as needed  - Monitor I&O, weight, and lab values if indicated  - Obtain nutrition services referral as needed  10/21/2023 1053 by Leta Dejesus RN  Outcome: Progressing  10/21/2023 1052 by Shashi Hardin RUDOLPH Bernal  Outcome: Progressing  Goal: Oral mucous membranes remain intact  Description: INTERVENTIONS  - Assess oral mucosa and hygiene practices  - Implement preventative oral hygiene regimen  - Implement oral medicated treatments as ordered  - Initiate Nutrition services referral as needed  10/21/2023 1053 by Juanita Fenton RN  Outcome: Progressing  10/21/2023 1052 by Juanita Fenton RN  Outcome: Progressing     Problem: GENITOURINARY - ADULT  Goal: Urinary catheter remains patent  Description: INTERVENTIONS:  - Assess patency of urinary catheter  - If patient has a chronic márquez, consider changing catheter if non-functioning  - Follow guidelines for intermittent irrigation of non-functioning urinary catheter  10/21/2023 1053 by Juanita Fenton RN  Outcome: Progressing  10/21/2023 1052 by Juanita Fetnon RN  Outcome: Progressing     Problem: METABOLIC, FLUID AND ELECTROLYTES - ADULT  Goal: Electrolytes maintained within normal limits  Description: INTERVENTIONS:  - Monitor labs and assess patient for signs and symptoms of electrolyte imbalances  - Administer electrolyte replacement as ordered  - Monitor response to electrolyte replacements, including repeat lab results as appropriate  - Instruct patient on fluid and nutrition as appropriate  10/21/2023 1053 by Juanita Fenton RN  Outcome: Progressing  10/21/2023 1052 by Juanita Fenton RN  Outcome: Progressing  Goal: Fluid balance maintained  Description: INTERVENTIONS:  - Monitor labs   - Monitor I/O and WT  - Instruct patient on fluid and nutrition as appropriate  - Assess for signs & symptoms of volume excess or deficit  10/21/2023 1053 by Juanita Fenton RN  Outcome: Progressing  10/21/2023 1052 by Juanita Fenton RN  Outcome: Progressing     Problem: SKIN/TISSUE INTEGRITY - ADULT  Goal: Skin Integrity remains intact(Skin Breakdown Prevention)  Description: Assess:  -Perform Jose assessment every shift  -Clean and moisturize skin every shift and as needed  -Inspect skin when repositioning, toileting, and assisting with ADLS  -Assess under medical devices such as wires every 2-4 hr  -Assess extremities for adequate circulation and sensation     Bed Management:  -Have minimal linens on bed & keep smooth, unwrinkled  -Change linens as needed when moist or perspiring  -Avoid sitting or lying in one position for more than 2 hr hours while in bed  -Keep HOB at 45 degrees     Toileting:  -Offer bedside commode  -Assess for incontinence every 2 hr  -Use incontinent care products after each incontinent episode such as foam cleanser    Activity:  -Encourage activity and walks on unit  -Encourage or provide ROM exercises   -Turn and reposition patient every 2 Hours  -Use appropriate equipment to lift or move patient in bed  -Instruct/ Assist with weight shifting every 60 min when out of bed in chair  -Consider limitation of chair time 2 hour intervals    Skin Care:  -Avoid use of baby powder, tape, friction and shearing, hot water or constrictive clothing  -Relieve pressure over bony prominences using waffle cushion  -Do not massage red bony areas    Next Steps:  -Teach patient strategies to minimize risks such as weight shifting   -Consider consults to  interdisciplinary teams such as wound care  10/21/2023 1053 by Simon Cazares, RN  Outcome: Progressing  10/21/2023 1052 by Simon Cazares, RN  Outcome: Progressing     Problem: MOBILITY - ADULT  Goal: Maintain or return to baseline ADL function  Description: INTERVENTIONS:  -  Assess patient's ability to carry out ADLs; assess patient's baseline for ADL function and identify physical deficits which impact ability to perform ADLs (bathing, care of mouth/teeth, toileting, grooming, dressing, etc.)  - Assess/evaluate cause of self-care deficits   - Assess range of motion  - Assess patient's mobility; develop plan if impaired  - Assess patient's need for assistive devices and provide as appropriate  - Encourage maximum independence but intervene and supervise when necessary  - Involve family in performance of ADLs  - Assess for home care needs following discharge   - Consider OT consult to assist with ADL evaluation and planning for discharge  - Provide patient education as appropriate  10/21/2023 1053 by Simon Cazares RN  Outcome: Progressing  10/21/2023 1052 by Simon Cazares RN  Outcome: Progressing  Goal: Maintains/Returns to pre admission functional level  Description: INTERVENTIONS:  - Perform BMAT or MOVE assessment daily.   - Set and communicate daily mobility goal to care team and patient/family/caregiver. - Collaborate with rehabilitation services on mobility goals if consulted  - Perform Range of Motion 3 times a day. - Reposition patient every 2 hours.   - Dangle patient 3 times a day  - Stand patient 3 times a day  - Ambulate patient 3 times a day  - Out of bed to chair 3 times a day   - Out of bed for meals 3 times a day  - Out of bed for toileting  - Record patient progress and toleration of activity level   10/21/2023 1053 by Simon Cazares RN  Outcome: Progressing  10/21/2023 1052 by Simon Cazares RN  Outcome: Progressing     Problem: Prexisting or High Potential for Compromised Skin Integrity  Goal: Skin integrity is maintained or improved  Description: INTERVENTIONS:  - Identify patients at risk for skin breakdown  - Assess and monitor skin integrity  - Assess and monitor nutrition and hydration status  - Monitor labs   - Assess for incontinence   - Turn and reposition patient  - Assist with mobility/ambulation  - Relieve pressure over bony prominences  - Avoid friction and shearing  - Provide appropriate hygiene as needed including keeping skin clean and dry  - Evaluate need for skin moisturizer/barrier cream  - Collaborate with interdisciplinary team   - Patient/family teaching  - Consider wound care consult   10/21/2023 1053 by Simon Cazares RN  Outcome: Progressing  10/21/2023 1052 by Hudson Monroy RUDOLPH Bernal  Outcome: Progressing     Problem: Nutrition/Hydration-ADULT  Goal: Nutrient/Hydration intake appropriate for improving, restoring or maintaining nutritional needs  Description: Monitor and assess patient's nutrition/hydration status for malnutrition. Collaborate with interdisciplinary team and initiate plan and interventions as ordered. Monitor patient's weight and dietary intake as ordered or per policy. Utilize nutrition screening tool and intervene as necessary. Determine patient's food preferences and provide high-protein, high-caloric foods as appropriate.      INTERVENTIONS:  - Monitor oral intake, urinary output, labs, and treatment plans  - Assess nutrition and hydration status and recommend course of action  - Evaluate amount of meals eaten  - Assist patient with eating if necessary   - Allow adequate time for meals  - Recommend/ encourage appropriate diets, oral nutritional supplements, and vitamin/mineral supplements  - Order, calculate, and assess calorie counts as needed  - Recommend, monitor, and adjust tube feedings and TPN/PPN based on assessed needs  - Assess need for intravenous fluids  - Provide specific nutrition/hydration education as appropriate  - Include patient/family/caregiver in decisions related to nutrition  10/21/2023 1053 by Tess Zapata RN  Outcome: Progressing  10/21/2023 1052 by Tess Zapata RN  Outcome: Progressing

## 2023-10-21 NOTE — ASSESSMENT & PLAN NOTE
Troponin 450->385  Initial EKG showed sinus tach with premature supraventricular complexes, rate 122, ST depression in lateral leads.   Repeat EKG showed A-fib with RVR, rate 140  Likely type II MI secondary to sepsis and  tachycardia  Trend troponin  Echo with preserved EF, no wall motion abnormalities   Patient was started on heparin drip in ED, transitioned to oral Eliquis   Consult cardiology-input appreciated  Continue medical management

## 2023-10-21 NOTE — PLAN OF CARE
Problem: PAIN - ADULT  Goal: Verbalizes/displays adequate comfort level or baseline comfort level  Description: Interventions:  - Encourage patient to monitor pain and request assistance  - Assess pain using appropriate pain scale  - Administer analgesics based on type and severity of pain and evaluate response  - Implement non-pharmacological measures as appropriate and evaluate response  - Consider cultural and social influences on pain and pain management  - Notify physician/advanced practitioner if interventions unsuccessful or patient reports new pain  Outcome: Progressing     Problem: INFECTION - ADULT  Goal: Absence or prevention of progression during hospitalization  Description: INTERVENTIONS:  - Assess and monitor for signs and symptoms of infection  - Monitor lab/diagnostic results  - Monitor all insertion sites, i.e. indwelling lines, tubes, and drains  - Monitor endotracheal if appropriate and nasal secretions for changes in amount and color  - [place] appropriate cooling/warming therapies per order  - Administer medications as ordered  - Instruct and encourage patient and family to use good hand hygiene technique  - Identify and instruct in appropriate isolation precautions for identified infection/condition  Outcome: Progressing     Problem: INFECTION - ADULT  Goal: Absence of fever/infection during neutropenic period  Description: INTERVENTIONS:  - Monitor WBC    Outcome: Progressing     Problem: SAFETY ADULT  Goal: Patient will remain free of falls  Description: INTERVENTIONS:  - Educate patient/family on patient safety including physical limitations  - Instruct patient to call for assistance with activity   - Consult OT/PT to assist with strengthening/mobility   - Keep Call bell within reach  - Keep bed low and locked with side rails adjusted as appropriate  - Keep care items and personal belongings within reach  - Initiate and maintain comfort rounds  - Make Fall Risk Sign visible to staff  - Offer Toileting every 2 Hours, in advance of need  - Initiate/Maintain bed alarm  - Obtain necessary fall risk management equipment: yes  - Apply yellow socks and bracelet for high fall risk patients  - Consider moving patient to room near nurses station  Outcome: Progressing     Problem: SAFETY ADULT  Goal: Maintain or return to baseline ADL function  Description: INTERVENTIONS:  -  Assess patient's ability to carry out ADLs; assess patient's baseline for ADL function and identify physical deficits which impact ability to perform ADLs (bathing, care of mouth/teeth, toileting, grooming, dressing, etc.)  - Assess/evaluate cause of self-care deficits   - Assess range of motion  - Assess patient's mobility; develop plan if impaired  - Assess patient's need for assistive devices and provide as appropriate  - Encourage maximum independence but intervene and supervise when necessary  - Involve family in performance of ADLs  - Assess for home care needs following discharge   - Consider OT consult to assist with ADL evaluation and planning for discharge  - Provide patient education as appropriate  Outcome: Progressing     Problem: SAFETY ADULT  Goal: Maintains/Returns to pre admission functional level  Description: INTERVENTIONS:  - Perform BMAT or MOVE assessment daily.   - Set and communicate daily mobility goal to care team and patient/family/caregiver. - Collaborate with rehabilitation services on mobility goals if consulted  - Perform Range of Motion 3 times a day. - Reposition patient every 2 hours.   - Dangle patient 3 times a day  - Stand patient 3 times a day  - Ambulate patient 3 times a day  - Out of bed to chair 3 times a day   - Out of bed for meals 3 times a day  - Out of bed for toileting  - Record patient progress and toleration of activity level   Outcome: Progressing     Problem: DISCHARGE PLANNING  Goal: Discharge to home or other facility with appropriate resources  Description: INTERVENTIONS:  - Identify barriers to discharge w/patient and caregiver  - Arrange for needed discharge resources and transportation as appropriate  - Identify discharge learning needs (meds, wound care, etc.)  - Arrange for interpretive services to assist at discharge as needed  - Refer to Case Management Department for coordinating discharge planning if the patient needs post-hospital services based on physician/advanced practitioner order or complex needs related to functional status, cognitive ability, or social support system  Outcome: Progressing     Problem: Knowledge Deficit  Goal: Patient/family/caregiver demonstrates understanding of disease process, treatment plan, medications, and discharge instructions  Description: Complete learning assessment and assess knowledge base.   Interventions:  - Provide teaching at level of understanding  - Provide teaching via preferred learning methods  Outcome: Progressing     Problem: CARDIOVASCULAR - ADULT  Goal: Maintains optimal cardiac output and hemodynamic stability  Description: INTERVENTIONS:  - Monitor I/O, vital signs and rhythm  - Monitor for S/S and trends of decreased cardiac output  - Administer and titrate ordered vasoactive medications to optimize hemodynamic stability  - Assess quality of pulses, skin color and temperature  - Assess for signs of decreased coronary artery perfusion  - Instruct patient to report change in severity of symptoms  Outcome: Progressing     Problem: CARDIOVASCULAR - ADULT  Goal: Absence of cardiac dysrhythmias or at baseline rhythm  Description: INTERVENTIONS:  - Continuous cardiac monitoring, vital signs, obtain 12 lead EKG if ordered  - Administer antiarrhythmic and heart rate control medications as ordered  - Monitor electrolytes and administer replacement therapy as ordered  Outcome: Progressing     Problem: RESPIRATORY - ADULT  Goal: Achieves optimal ventilation and oxygenation  Description: INTERVENTIONS:  - Assess for changes in respiratory status  - Assess for changes in mentation and behavior  - Position to facilitate oxygenation and minimize respiratory effort  - Oxygen administered by appropriate delivery if ordered  - Initiate smoking cessation education as indicated  - Encourage broncho-pulmonary hygiene including cough, deep breathe, Incentive Spirometry  - Assess the need for suctioning and aspirate as needed  - Assess and instruct to report SOB or any respiratory difficulty  - Respiratory Therapy support as indicated  Outcome: Progressing     Problem: GASTROINTESTINAL - ADULT  Goal: Maintains adequate nutritional intake  Description: INTERVENTIONS:  - Monitor percentage of each meal consumed  - Identify factors contributing to decreased intake, treat as appropriate  - Assist with meals as needed  - Monitor I&O, weight, and lab values if indicated  - Obtain nutrition services referral as needed  Outcome: Progressing     Problem: GASTROINTESTINAL - ADULT  Goal: Oral mucous membranes remain intact  Description: INTERVENTIONS  - Assess oral mucosa and hygiene practices  - Implement preventative oral hygiene regimen  - Implement oral medicated treatments as ordered  - Initiate Nutrition services referral as needed  Outcome: Progressing     Problem: GENITOURINARY - ADULT  Goal: Urinary catheter remains patent  Description: INTERVENTIONS:  - Assess patency of urinary catheter  - If patient has a chronic márquez, consider changing catheter if non-functioning  - Follow guidelines for intermittent irrigation of non-functioning urinary catheter  Outcome: Progressing     Problem: METABOLIC, FLUID AND ELECTROLYTES - ADULT  Goal: Electrolytes maintained within normal limits  Description: INTERVENTIONS:  - Monitor labs and assess patient for signs and symptoms of electrolyte imbalances  - Administer electrolyte replacement as ordered  - Monitor response to electrolyte replacements, including repeat lab results as appropriate  - Instruct patient on fluid and nutrition as appropriate  Outcome: Progressing     Problem: METABOLIC, FLUID AND ELECTROLYTES - ADULT  Goal: Fluid balance maintained  Description: INTERVENTIONS:  - Monitor labs   - Monitor I/O and WT  - Instruct patient on fluid and nutrition as appropriate  - Assess for signs & symptoms of volume excess or deficit  Outcome: Progressing     Problem: SKIN/TISSUE INTEGRITY - ADULT  Goal: Skin Integrity remains intact(Skin Breakdown Prevention)  Description: Assess:  -Perform Jose assessment every shift.  -Clean and moisturize skin every shift.  -Inspect skin when repositioning, toileting, and assisting with ADLS  -Assess extremities for adequate circulation and sensation     Bed Management:  -Have minimal linens on bed & keep smooth, unwrinkled  -Change linens as needed when moist or perspiring  -Avoid sitting or lying in one position for more than 2 hours while in bed  -Keep HOB at 45 degrees     Toileting:  -Offer bedside commode  -Assess for incontinence every shift.  -Use incontinent care products after each incontinent episode such as protective barrier.      Activity:  -Mobilize patient 3 times a day  -Encourage activity and walks on unit  -Encourage or provide ROM exercises   -Turn and reposition patient every 2 Hours  -Use appropriate equipment to lift or move patient in bed  -Instruct/ Assist with weight shifting every 2 when out of bed in chair  -Consider limitation of chair time 2 hour intervals    Skin Care:  -Avoid use of baby powder, tape, friction and shearing, hot water or constrictive clothing  -Relieve pressure over bony prominences using pillow  -Do not massage red bony areas    Next Steps:   -Consider consults to  interdisciplinary teams such as Wound care Team and Nutrition Team.  Outcome: Progressing     Problem: MOBILITY - ADULT  Goal: Maintains/Returns to pre admission functional level  Description: INTERVENTIONS:  - Perform BMAT or MOVE assessment daily.   - Set and communicate daily mobility goal to care team and patient/family/caregiver. - Collaborate with rehabilitation services on mobility goals if consulted  - Perform Range of Motion 3 times a day. - Reposition patient every 2 hours. - Dangle patient 3 times a day  - Stand patient 3 times a day  - Ambulate patient 3 times a day  - Out of bed to chair 3 times a day   - Out of bed for meals 3 times a day  - Out of bed for toileting  - Record patient progress and toleration of activity level   Outcome: Progressing     Problem: Prexisting or High Potential for Compromised Skin Integrity  Goal: Skin integrity is maintained or improved  Description: INTERVENTIONS:  - Identify patients at risk for skin breakdown  - Assess and monitor skin integrity  - Assess and monitor nutrition and hydration status  - Monitor labs   - Assess for incontinence   - Turn and reposition patient  - Assist with mobility/ambulation  - Relieve pressure over bony prominences  - Avoid friction and shearing  - Provide appropriate hygiene as needed including keeping skin clean and dry  - Evaluate need for skin moisturizer/barrier cream  - Collaborate with interdisciplinary team   - Patient/family teaching  - Consider wound care consult   Outcome: Progressing     Problem: Nutrition/Hydration-ADULT  Goal: Nutrient/Hydration intake appropriate for improving, restoring or maintaining nutritional needs  Description: Monitor and assess patient's nutrition/hydration status for malnutrition. Collaborate with interdisciplinary team and initiate plan and interventions as ordered. Monitor patient's weight and dietary intake as ordered or per policy. Utilize nutrition screening tool and intervene as necessary. Determine patient's food preferences and provide high-protein, high-caloric foods as appropriate.      INTERVENTIONS:  - Monitor oral intake, urinary output, labs, and treatment plans  - Assess nutrition and hydration status and recommend course of action  - Evaluate amount of meals eaten  - Assist patient with eating if necessary   - Allow adequate time for meals  - Recommend/ encourage appropriate diets, oral nutritional supplements, and vitamin/mineral supplements  - Order, calculate, and assess calorie counts as needed  - Recommend, monitor, and adjust tube feedings and TPN/PPN based on assessed needs  - Assess need for intravenous fluids  - Provide specific nutrition/hydration education as appropriate  - Include patient/family/caregiver in decisions related to nutrition  Outcome: Progressing

## 2023-10-21 NOTE — PROGRESS NOTES
Progress Note - Cardiology   [facility]     Diana Mckinney 81 y.o. male MRN: [id number]  : 1940  Unit/Bed#: [address] [place] [address] Encounter: [id number]    Assessment and Plan:   1. Acute respiratory failure with hypoxia question aspiration pneumonia: Concern for sepsis on admission    -   Speech therapy information appreciated and patient started on thicker liquids    -   Continue Zosyn 3.375 mg every 6 hours per primary team     2. Paroxysmal atrial fibrillation: Patient with documented rapid atrial fibrillation in the setting of sepsis    -   Continue Lopressor 25 mg twice daily    -   [id number] score = 3, family agreeable to anticoagulation    -   We will start Eliquis 2.5 mg twice daily    -   Dose reduction due to age of 81 and weight less than 60 kg    3. Hypernatremia: Sodium elevated off IV fluids. P.o. intake decreased as patient does not seem to likely thickened liquids    -   IV fluids per primary team    -   Question whether patient would benefit from PEG tube for supplemental feedings, meds and fluids     4. Nonischemic myocardial injury secondary to sepsis, tachycardia and ST depression seen concerning for subendocardial injury: Lopressor 25 mg every 12 hours    -   Continue aspirin 81 mg once a day    -   Family request optimal medical therapy without any invasive procedures     5. Coronary artery disease: Many years ago patient had been seen by [facility] cardiology in [facility]. He was documented as having coronary artery disease with history of PCI in 2019, but unfortunately vessel was not noted    -   We will continue optimal medical therapy     6.   Hypertension: Labetalol discontinued and patient transition to Lopressor due to paroxysmal atrial fibrillation    -   Continue Cozaar 50 mg daily    -   Continue Lopressor 25 mg every 12 hours    -   We will start Procardia XL 30 mg once a day    -   Continue to monitor vital signs and adjust medication as needed 7.  Dementia: Continue supportive therapy and redirection     8. Urinary retention: Calderon catheter inserted in the emergency room on admission with return of 1 L of fluid    -   Urology following    Subjective / Objective:   Patient seen and examined. Fluids were discontinued yesterday and patient seems to be sleepy and difficult to respond today sodium level is elevated to 151 fluids per primary team.  Question whether patient may benefit from PEG tube for medications and fluids and supplemental feeding. Vitals: Blood pressure 166/87, pulse 74, temperature 97.7 °F (36.5 °C), resp. rate 18, height 5' 7" (1.702 m), weight 53.4 kg (117 lb 11.6 oz), SpO2 94 %. Vitals:    10/19/23 0318 10/19/23 0729   Weight: 53.4 kg (117 lb 12.8 oz) 53.4 kg (117 lb 11.6 oz)     Body mass index is 18.44 kg/m². BP Readings from Last 3 Encounters:   10/21/23 166/87   07/04/23 122/77   04/20/23 (!) 185/91     Orthostatic Blood Pressures      Flowsheet Row Most Recent Value   Blood Pressure 166/87 filed at 10/21/2023 0806   Patient Position - Orthostatic VS Lying filed at 10/20/2023 2310          I/O         10/19 0701  10/20 0700 10/20 0701  10/21 0700 10/21 0701  10/22 0700    P. O. 440 480     I.V. (mL/kg)  10 (0.2)     IV Piggyback       Total Intake(mL/kg) 440 (8.2) 490 (9.2)     Urine (mL/kg/hr)  800 (0.6)     Total Output  800     Net +440 -310                  Invasive Devices       Peripheral Intravenous Line  Duration             Peripheral IV 10/18/23 Left Antecubital 2 days    Peripheral IV 10/18/23 Right;Ventral (anterior) Hand 2 days              Drain  Duration             Urethral Catheter Non-latex 12 Fr. 2 days                      Intake/Output Summary (Last 24 hours) at 10/21/2023 0936  Last data filed at 10/21/2023 0600  Gross per 24 hour   Intake 250 ml   Output 800 ml   Net -550 ml         Physical Exam:   Physical Exam  Vitals and nursing note reviewed. Constitutional:       Appearance: Normal appearance. He is underweight. HENT:      Right Ear: External ear normal.      Left Ear: External ear normal.   Eyes:      General: No scleral icterus. Right eye: No discharge. Left eye: No discharge. Cardiovascular:      Rate and Rhythm: Normal rate and regular rhythm. Pulses: Normal pulses. Heart sounds: Murmur heard. Pulmonary:      Effort: Pulmonary effort is normal. No accessory muscle usage. Breath sounds: Examination of the right-lower field reveals decreased breath sounds. Examination of the left-lower field reveals decreased breath sounds. Decreased breath sounds present. Abdominal:      General: Bowel sounds are normal. There is no distension. Palpations: Abdomen is soft. Musculoskeletal:      Right lower leg: No edema. Left lower leg: No edema. Skin:     General: Skin is warm and dry. Capillary Refill: Capillary refill takes less than 2 seconds. Neurological:      General: No focal deficit present. Mental Status: He is oriented to person, place, and time. Mental status is at baseline. Psychiatric:         Mood and Affect: Mood normal.         Behavior: Behavior is cooperative.                  Medications/ Allergies:     Current Facility-Administered Medications   Medication Dose Route Frequency Provider Last Rate    acetaminophen  650 mg Oral Q6H PRN NAHUN Mead      apixaban  2.5 mg Oral BID NAHUN Baez      aspirin  81 mg Oral Daily NAHUN Mead      dextrose  50 mL/hr Intravenous Continuous NAHUN Oscar 50 mL/hr (10/21/23 0919)    donepezil  5 mg Oral HS NAHUN Mead      famotidine  20 mg Intravenous Q24H [address] NAHUN Mead      glycerin-hypromellose-  1 drop Both Eyes BID NAHUN Mead      levalbuterol  0.63 mg Nebulization Q4H PRN NAHUN Mead      levothyroxine  50 mcg Oral Early Morning NAHUN Mead      losartan  50 mg Oral Daily NAHUN Baez      metoprolol tartrate  25 mg Oral Q12H [address] Alexandro Noe, CRNP      NIFEdipine  30 mg Oral Daily Alexandro Love, CRNP      nitroglycerin  0.4 mg Sublingual Q5 Min PRN Danutafartun Lorrik, CRNP      ondansetron  4 mg Intravenous Q6H PRN Danutafartun Lorrik, CRNP      piperacillin-tazobactam  3.375 g Intravenous Q6H Cuitachofartun Lorrik, CRNP      potassium chloride  40 mEq Oral BID Alexandro Love, CRNP      saccharomyces boulardii  250 mg Oral BID Cuishaheen Horowitzrik, CRNP      senna  1 tablet Oral BID Cuishaheen Horowitzrik, CRNP      tamsulosin  0.4 mg Oral Daily With [employer], CRNP       acetaminophen, 650 mg, Q6H PRN  levalbuterol, 0.63 mg, Q4H PRN  nitroglycerin, 0.4 mg, Q5 Min PRN  ondansetron, 4 mg, Q6H PRN      Allergies   Allergen Reactions    Shellfish-Derived Products - Food Allergy Other (See Comments)     Not able to assess       VTE Pharmacologic Prophylaxis:   Sequential compression device (Venodyne)     Labs:   Troponins:  Results from last 7 days   Lab Units 10/19/23  0443 10/19/23  0116 10/18/23  2250   HS TNI RAND ng/L 225*  --   --    HSTNI D2 ng/L  --   --  -65   HSTNI D4 ng/L  --  -170  --      CBC with diff:  Results from last 7 days   Lab Units 10/21/23  0527 10/20/23  0949 10/19/23  0443 10/18/23  2101   WBC Thousand/uL 5.79 5.33 7.73 10.36*   HEMOGLOBIN g/dL 11.1* 11.3* 11.5* 14.1   HEMATOCRIT % 34.8* 35.5* 36.2* 42.8   MCV fL 91 91 91 89   PLATELETS Thousands/uL 169 158 160 192   RBC Million/uL 3.81* 3.89 3.97 4.80   MCH pg 29.1 29.0 29.0 29.4   MCHC g/dL 31.9 31.8 31.8 32.9   RDW % 14.6 14.6 14.8 14.9   MPV fL 11.1 11.2 11.6 11.8     CMP:  Results from last 7 days   Lab Units 10/21/23  0527 10/20/23  0949 10/19/23  0443 10/18/23  2101   SODIUM mmol/L 151* 149* 146 142   POTASSIUM mmol/L 3.4* 3.1* 3.7 4.3   CHLORIDE mmol/L 118* 116* 114* 107   CO2 mmol/L 26 27 24 25   ANION GAP mmol/L 7 6 8 10   BUN mg/dL 19 24 33* 37*   CREATININE mg/dL 0.79 0.89 0.81 1.05   CALCIUM mg/dL 8.7 9.1 8.8 9.7   AST U/L  --   --   --  15   ALT U/L --   --   --  8   ALK PHOS U/L  --   --   --  61   TOTAL PROTEIN g/dL  --   --   --  6.9   ALBUMIN g/dL  --   --   --  3.5   TOTAL BILIRUBIN mg/dL  --   --   --  0.90   EGFR ml/min/1.73sq m 83 79 82 65     Magnesium:  Results from last 7 days   Lab Units 10/20/23  0949 10/19/23  0443 10/18/23  2101   MAGNESIUM mg/dL 1.9 2.2 2.4     Coags:  Results from last 7 days   Lab Units 10/20/23  0949 10/20/23  0156 10/19/23  2018 10/19/23  1248 10/19/23  0443 10/18/23  2101   PTT seconds 86* 33 62* 59* 50* 33   INR   --   --   --   --   --  1.30*     TSH:  Results from last 7 days   Lab Units 10/19/23  0443   TSH 3RD GENERATON uIU/mL 0.573     No components found for: "TSH3"  Lipid Profile:  Results from last 7 days   Lab Units 10/19/23  0443   CHOLESTEROL mg/dL 110   TRIGLYCERIDES mg/dL 96   HDL mg/dL 19*   LDL CALC mg/dL 72     Hgb A1c:  Results from last 7 days   Lab Units 10/18/23  2101   HEMOGLOBIN A1C % 5.5       Imaging & Testing   I have personally reviewed pertinent reports. Echo complete w/ contrast if indicated    Result Date: 10/19/2023  Narrative:   Left Ventricle: Left ventricular cavity size is normal. Wall thickness is mildly increased at intraventricular septum of 1.3cm The left ventricular ejection fraction is 61% by visual estimation. . Systolic function is normal. Wall motion is normal. Diastolic function is normal for age. Right Ventricle: Systolic function is normal. Normal tricuspid annular plane systolic excursion (TAPSE) > 1.7 cm. Left Atrium: The atrium is normal in size. Right Atrium: The atrium is mildly dilated. XR chest 1 view portable    Result Date: 10/19/2023  Narrative: CHEST INDICATION:   lethargy. COMPARISON: CXR and chest CT 6/30/2023. EXAM PERFORMED/VIEWS:  XR CHEST PORTABLE. FINDINGS: Cardiomediastinal silhouette normal. Slightly increased opacity in the left base. No effusion or pneumothorax. Upper abdomen normal. Bones normal for age.      Impression: Slightly increased opacity in the left base, question recurrent pneumonia or postinfectious scar from previous pneumonia. Workstation performed: [id number]     CT head wo contrast    Result Date: 10/19/2023  Narrative: CT BRAIN - WITHOUT CONTRAST INDICATION:   MURALI Paige Altered mental status. COMPARISON: CT head without contrast 6/30/2023, 4/19/2023. TECHNIQUE:  CT examination of the brain was performed. Multiplanar 2D reformatted images were created from the source data. Radiation dose length product (DLP) for this visit:  911.68 mGy-cm . This examination, like all CT scans performed in the [facility], was performed utilizing techniques to minimize radiation dose exposure, including the use of iterative  reconstruction and automated exposure control. IMAGE QUALITY:  Diagnostic. FINDINGS: PARENCHYMA: Decreased attenuation is noted in periventricular and subcortical white matter demonstrating an appearance that is statistically most likely to represent mild microangiopathic change. Chronic lacunar infarct in right posterior limb of right internal capsule. No CT signs of acute infarction. No intracranial mass, mass effect or midline shift. No acute parenchymal hemorrhage. Arterial calcifications of carotid siphons and bilateral intradural vertebral arteries. VENTRICLES AND EXTRA-AXIAL SPACES:  Ventricles and extra-axial CSF spaces are prominent commensurate with the degree of volume loss. No hydrocephalus. No acute extra-axial hemorrhage. Small retrocerebellar arachnoid cyst. VISUALIZED ORBITS: Normal visualized orbits. PARANASAL SINUSES: Normal visualized paranasal sinuses. CALVARIUM AND EXTRACRANIAL SOFT TISSUES:  Normal.     Impression: No acute intracranial abnormality. Workstation performed: [id number]   Cardiology      "This note was completed in part utilizing [email or web address] direct voice recognition software.    Grammatical errors, random word insertion, spelling mistakes, and incomplete sentences may be an occasional consequence of the system secondary to software limitations, ambient noise and hardware issues. Please read the chart carefully and recognize, using context, where substitutions have occurred.   If you have any questions or concerns about the context, text or information contained within the body of this dictation, please contact myself, the provider, for further clarification."

## 2023-10-21 NOTE — ASSESSMENT & PLAN NOTE
Na 151; had been trending up   Will do gentle IVF with D5 for 12 hours  Repeat BMP this afternoon shows Na 148  Suspect secondary to free water deficit  Repeat in am

## 2023-10-21 NOTE — ASSESSMENT & PLAN NOTE
In the setting of sepsis. Episodes of A-fib with RVR during hospitalization in June to July 2023 in the setting of severe sepsis per chart review. History of PACs per chart review. Rate controlled currently  Currently on metoprolol  Echo - EF of 60% with no wall motion abnormality   Optimize electrolytes  Had been on heparin drip   Cardiology following, input appreciated  Discussed with family regarding risk and benefits of long-term anticoagulation. No history of bleeding in the past.  History of fall previously but currently patient is bedbound.   Changed to Eliquis for stroke prevention

## 2023-10-22 PROBLEM — R13.10 DYSPHAGIA: Status: ACTIVE | Noted: 2023-10-22

## 2023-10-22 LAB
ANION GAP SERPL CALCULATED.3IONS-SCNC: 8 MMOL/L
BUN SERPL-MCNC: 15 MG/DL (ref 5–25)
CALCIUM SERPL-MCNC: 9 MG/DL (ref 8.4–10.2)
CHLORIDE SERPL-SCNC: 113 MMOL/L (ref 96–108)
CO2 SERPL-SCNC: 26 MMOL/L (ref 21–32)
CREAT SERPL-MCNC: 0.78 MG/DL (ref 0.6–1.3)
ERYTHROCYTE [DISTWIDTH] IN BLOOD BY AUTOMATED COUNT: 14.5 % (ref 11.6–15.1)
GFR SERPL CREATININE-BSD FRML MDRD: 84 ML/MIN/1.73SQ M
GLUCOSE SERPL-MCNC: 113 MG/DL (ref 65–140)
HCT VFR BLD AUTO: 38.6 % (ref 36.5–49.3)
HGB BLD-MCNC: 12.3 G/DL (ref 12–17)
MCH RBC QN AUTO: 28.5 PG (ref 26.8–34.3)
MCHC RBC AUTO-ENTMCNC: 31.9 G/DL (ref 31.4–37.4)
MCV RBC AUTO: 90 FL (ref 82–98)
PLATELET # BLD AUTO: 224 THOUSANDS/UL (ref 149–390)
PMV BLD AUTO: 10.7 FL (ref 8.9–12.7)
POTASSIUM SERPL-SCNC: 3.6 MMOL/L (ref 3.5–5.3)
RBC # BLD AUTO: 4.31 MILLION/UL (ref 3.88–5.62)
SODIUM SERPL-SCNC: 147 MMOL/L (ref 135–147)
WBC # BLD AUTO: 6.66 THOUSAND/UL (ref 4.31–10.16)

## 2023-10-22 PROCEDURE — 80048 BASIC METABOLIC PNL TOTAL CA: CPT | Performed by: INTERNAL MEDICINE

## 2023-10-22 PROCEDURE — 99232 SBSQ HOSP IP/OBS MODERATE 35: CPT | Performed by: NURSE PRACTITIONER

## 2023-10-22 PROCEDURE — 85027 COMPLETE CBC AUTOMATED: CPT | Performed by: INTERNAL MEDICINE

## 2023-10-22 RX ORDER — CEFTRIAXONE 1 G/50ML
1000 INJECTION, SOLUTION INTRAVENOUS EVERY 24 HOURS
Status: DISCONTINUED | OUTPATIENT
Start: 2023-10-22 | End: 2023-10-24 | Stop reason: HOSPADM

## 2023-10-22 RX ORDER — HYDRALAZINE HYDROCHLORIDE 20 MG/ML
10 INJECTION INTRAMUSCULAR; INTRAVENOUS EVERY 6 HOURS PRN
Status: DISCONTINUED | OUTPATIENT
Start: 2023-10-22 | End: 2023-10-24 | Stop reason: HOSPADM

## 2023-10-22 RX ORDER — DEXTROSE MONOHYDRATE 50 MG/ML
50 INJECTION, SOLUTION INTRAVENOUS CONTINUOUS
Status: DISCONTINUED | OUTPATIENT
Start: 2023-10-22 | End: 2023-10-23

## 2023-10-22 RX ADMIN — CEFTRIAXONE 1000 MG: 1 INJECTION, SOLUTION INTRAVENOUS at 13:24

## 2023-10-22 RX ADMIN — GLYCERIN, HYPROMELLOSE, POLYETHYLENE GLYCOL 1 DROP: .2; .2; 1 LIQUID OPHTHALMIC at 09:18

## 2023-10-22 RX ADMIN — GLYCERIN, HYPROMELLOSE, POLYETHYLENE GLYCOL 1 DROP: .2; .2; 1 LIQUID OPHTHALMIC at 17:24

## 2023-10-22 RX ADMIN — LEVOTHYROXINE SODIUM 50 MCG: 50 TABLET ORAL at 05:17

## 2023-10-22 RX ADMIN — HYDRALAZINE HYDROCHLORIDE 10 MG: 20 INJECTION INTRAMUSCULAR; INTRAVENOUS at 22:41

## 2023-10-22 RX ADMIN — DEXTROSE 50 ML/HR: 5 SOLUTION INTRAVENOUS at 13:24

## 2023-10-22 RX ADMIN — PIPERACILLIN AND TAZOBACTAM 3.38 G: 36; 4.5 INJECTION, POWDER, FOR SOLUTION INTRAVENOUS at 02:36

## 2023-10-22 RX ADMIN — FAMOTIDINE 20 MG: 10 INJECTION, SOLUTION INTRAVENOUS at 09:18

## 2023-10-22 RX ADMIN — PIPERACILLIN AND TAZOBACTAM 3.38 G: 36; 4.5 INJECTION, POWDER, FOR SOLUTION INTRAVENOUS at 09:20

## 2023-10-22 NOTE — ASSESSMENT & PLAN NOTE
Na 151; had been trending up   Repeat BMP down to 147  Suspect secondary to free water deficit  10/22 patient noted to be choking on food and medications, n.p.o. at this time.   We will do D5 W at 50 cc an hour while NPO  Repeat in am

## 2023-10-22 NOTE — ASSESSMENT & PLAN NOTE
Patient noted to be choking and coughing on food and medications on 10/22. Discussed with speech therapist, make n.p.o. at this time and will do video barium swallow in AM.  Discussed with patient daughter.

## 2023-10-22 NOTE — ASSESSMENT & PLAN NOTE
Patient presents with hypoxia, SPO2 in 80s on room air since yesterday in nursing home, was placed on O2 5 L in nursing home to get SPO2 above 90%. Per staff, patient has not been eating or drinking much for at least 2 days, having cough with nectar thick liquids, having audible wheezing, not responding as he normally does for at least 2 days. At baseline patient is able to talk a few words, able to feed self, able to follow simple directions and able to do puzzle and stack blocks per staff. Patient has not been able to do all of the above for about one week. Had a chest x-ray yesterday in nursing home which was unremarkable. Patient was given Levaquin 500mg p.o. prior to coming to ED. No fever in nursing home per staff. Chart reviewed. Patient was hospitalized between 6/30-7/4/2023 for severe sepsis secondary to left-sided pneumonia, acute hypoxic respiratory failure, UTI. Patient was treated with cefepime and cefdinir for 7 days total.  Patient was found to have hilar mass on CT without contrast.  Radiology recommended CT chest with contrast, however daughter declined the test as she would not further pursue any treatment if the mass was concerning. Chest x-ray evidence of increased opacity in the left base  Fever 101.4 in ED, patient was in A-fib with RVR intermittently in ED, meets sepsis criteria. Management as below. Procalcitonin 5.54. Now afebrile, remains hemodynamically stable. Now saturating mid 90s on room air at rest.  Likely secondary to pneumonia and sepsis with encephalopathy  Patient given Rocephin Zithromax in ED. patient was on Zosyn, de-escalated to ceftriaxone, monitor procalcitonin on ceftriaxone  Speech eval-recommended purée with honey thick liquid, aspiration precaution-10/22 patient noted to be choking on food, discussed with speech therapist, make n.p.o. and will do video barium swallow in a.m.   Gentle IV hydration  Xopenex as needed, chest PT as tolerated  Continue supplemental oxygen to maintain sats above 90%.

## 2023-10-22 NOTE — PLAN OF CARE
Problem: PAIN - ADULT  Goal: Verbalizes/displays adequate comfort level or baseline comfort level  Description: Interventions:  - Encourage patient to monitor pain and request assistance  - Assess pain using appropriate pain scale  - Administer analgesics based on type and severity of pain and evaluate response  - Implement non-pharmacological measures as appropriate and evaluate response  - Consider cultural and social influences on pain and pain management  - Notify physician/advanced practitioner if interventions unsuccessful or patient reports new pain  Outcome: Progressing     Problem: INFECTION - ADULT  Goal: Absence or prevention of progression during hospitalization  Description: INTERVENTIONS:  - Assess and monitor for signs and symptoms of infection  - Monitor lab/diagnostic results  - Monitor all insertion sites, i.e. indwelling lines, tubes, and drains  - Monitor endotracheal if appropriate and nasal secretions for changes in amount and color  - [place] appropriate cooling/warming therapies per order  - Administer medications as ordered  - Instruct and encourage patient and family to use good hand hygiene technique  - Identify and instruct in appropriate isolation precautions for identified infection/condition  Outcome: Progressing  Goal: Absence of fever/infection during neutropenic period  Description: INTERVENTIONS:  - Monitor WBC    Outcome: Progressing     Problem: SAFETY ADULT  Goal: Patient will remain free of falls  Description: INTERVENTIONS:  - Educate patient/family on patient safety including physical limitations  - Instruct patient to call for assistance with activity   - Consult OT/PT to assist with strengthening/mobility   - Keep Call bell within reach  - Keep bed low and locked with side rails adjusted as appropriate  - Keep care items and personal belongings within reach  - Initiate and maintain comfort rounds  - Make Fall Risk Sign visible to staff  - Offer Toileting every 2 Hours, in advance of need  - Initiate/Maintain bed alarm  - Obtain necessary fall risk management equipment: yes  - Apply yellow socks and bracelet for high fall risk patients  - Consider moving patient to room near nurses station  Outcome: Progressing  Goal: Maintain or return to baseline ADL function  Description: INTERVENTIONS:  -  Assess patient's ability to carry out ADLs; assess patient's baseline for ADL function and identify physical deficits which impact ability to perform ADLs (bathing, care of mouth/teeth, toileting, grooming, dressing, etc.)  - Assess/evaluate cause of self-care deficits   - Assess range of motion  - Assess patient's mobility; develop plan if impaired  - Assess patient's need for assistive devices and provide as appropriate  - Encourage maximum independence but intervene and supervise when necessary  - Involve family in performance of ADLs  - Assess for home care needs following discharge   - Consider OT consult to assist with ADL evaluation and planning for discharge  - Provide patient education as appropriate  Outcome: Progressing  Goal: Maintains/Returns to pre admission functional level  Description: INTERVENTIONS:  - Perform BMAT or MOVE assessment daily.   - Set and communicate daily mobility goal to care team and patient/family/caregiver. - Collaborate with rehabilitation services on mobility goals if consulted  - Perform Range of Motion 3 times a day. - Reposition patient every 2 hours.   - Dangle patient 3 times a day  - Stand patient 3 times a day  - Ambulate patient 3 times a day  - Out of bed to chair 3 times a day   - Out of bed for meals 3 times a day  - Out of bed for toileting  - Record patient progress and toleration of activity level   Outcome: Progressing

## 2023-10-22 NOTE — PLAN OF CARE
Problem: PAIN - ADULT  Goal: Verbalizes/displays adequate comfort level or baseline comfort level  Description: Interventions:  - Encourage patient to monitor pain and request assistance  - Assess pain using appropriate pain scale  - Administer analgesics based on type and severity of pain and evaluate response  - Implement non-pharmacological measures as appropriate and evaluate response  - Consider cultural and social influences on pain and pain management  - Notify physician/advanced practitioner if interventions unsuccessful or patient reports new pain  Outcome: Progressing     Problem: INFECTION - ADULT  Goal: Absence or prevention of progression during hospitalization  Description: INTERVENTIONS:  - Assess and monitor for signs and symptoms of infection  - Monitor lab/diagnostic results  - Monitor all insertion sites, i.e. indwelling lines, tubes, and drains  - Monitor endotracheal if appropriate and nasal secretions for changes in amount and color  - [place] appropriate cooling/warming therapies per order  - Administer medications as ordered  - Instruct and encourage patient and family to use good hand hygiene technique  - Identify and instruct in appropriate isolation precautions for identified infection/condition  Outcome: Progressing  Goal: Absence of fever/infection during neutropenic period  Description: INTERVENTIONS:  - Monitor WBC    Outcome: Progressing     Problem: SAFETY ADULT  Goal: Patient will remain free of falls  Description: INTERVENTIONS:  - Educate patient/family on patient safety including physical limitations  - Instruct patient to call for assistance with activity   - Consult OT/PT to assist with strengthening/mobility   - Keep Call bell within reach  - Keep bed low and locked with side rails adjusted as appropriate  - Keep care items and personal belongings within reach  - Initiate and maintain comfort rounds  - Make Fall Risk Sign visible to staff  - Offer Toileting every 2 Hours, in advance of need  - Initiate/Maintain bed alarm  - Obtain necessary fall risk management equipment: yes  - Apply yellow socks and bracelet for high fall risk patients  - Consider moving patient to room near nurses station  Outcome: Progressing  Goal: Maintain or return to baseline ADL function  Description: INTERVENTIONS:  -  Assess patient's ability to carry out ADLs; assess patient's baseline for ADL function and identify physical deficits which impact ability to perform ADLs (bathing, care of mouth/teeth, toileting, grooming, dressing, etc.)  - Assess/evaluate cause of self-care deficits   - Assess range of motion  - Assess patient's mobility; develop plan if impaired  - Assess patient's need for assistive devices and provide as appropriate  - Encourage maximum independence but intervene and supervise when necessary  - Involve family in performance of ADLs  - Assess for home care needs following discharge   - Consider OT consult to assist with ADL evaluation and planning for discharge  - Provide patient education as appropriate  Outcome: Progressing  Goal: Maintains/Returns to pre admission functional level  Description: INTERVENTIONS:  - Perform BMAT or MOVE assessment daily.   - Set and communicate daily mobility goal to care team and patient/family/caregiver. - Collaborate with rehabilitation services on mobility goals if consulted  - Perform Range of Motion 3 times a day. - Reposition patient every 2 hours.   - Dangle patient 3 times a day  - Stand patient 3 times a day  - Ambulate patient 3 times a day  - Out of bed to chair 3 times a day   - Out of bed for meals 3 times a day  - Out of bed for toileting  - Record patient progress and toleration of activity level   Outcome: Progressing     Problem: DISCHARGE PLANNING  Goal: Discharge to home or other facility with appropriate resources  Description: INTERVENTIONS:  - Identify barriers to discharge w/patient and caregiver  - Arrange for needed discharge resources and transportation as appropriate  - Identify discharge learning needs (meds, wound care, etc.)  - Arrange for interpretive services to assist at discharge as needed  - Refer to Case Management Department for coordinating discharge planning if the patient needs post-hospital services based on physician/advanced practitioner order or complex needs related to functional status, cognitive ability, or social support system  Outcome: Progressing     Problem: Knowledge Deficit  Goal: Patient/family/caregiver demonstrates understanding of disease process, treatment plan, medications, and discharge instructions  Description: Complete learning assessment and assess knowledge base.   Interventions:  - Provide teaching at level of understanding  - Provide teaching via preferred learning methods  Outcome: Progressing     Problem: CARDIOVASCULAR - ADULT  Goal: Maintains optimal cardiac output and hemodynamic stability  Description: INTERVENTIONS:  - Monitor I/O, vital signs and rhythm  - Monitor for S/S and trends of decreased cardiac output  - Administer and titrate ordered vasoactive medications to optimize hemodynamic stability  - Assess quality of pulses, skin color and temperature  - Assess for signs of decreased coronary artery perfusion  - Instruct patient to report change in severity of symptoms  Outcome: Progressing  Goal: Absence of cardiac dysrhythmias or at baseline rhythm  Description: INTERVENTIONS:  - Continuous cardiac monitoring, vital signs, obtain 12 lead EKG if ordered  - Administer antiarrhythmic and heart rate control medications as ordered  - Monitor electrolytes and administer replacement therapy as ordered  Outcome: Progressing     Problem: RESPIRATORY - ADULT  Goal: Achieves optimal ventilation and oxygenation  Description: INTERVENTIONS:  - Assess for changes in respiratory status  - Assess for changes in mentation and behavior  - Position to facilitate oxygenation and minimize respiratory effort  - Oxygen administered by appropriate delivery if ordered  - Initiate smoking cessation education as indicated  - Encourage broncho-pulmonary hygiene including cough, deep breathe, Incentive Spirometry  - Assess the need for suctioning and aspirate as needed  - Assess and instruct to report SOB or any respiratory difficulty  - Respiratory Therapy support as indicated  Outcome: Progressing     Problem: GASTROINTESTINAL - ADULT  Goal: Maintains adequate nutritional intake  Description: INTERVENTIONS:  - Monitor percentage of each meal consumed  - Identify factors contributing to decreased intake, treat as appropriate  - Assist with meals as needed  - Monitor I&O, weight, and lab values if indicated  - Obtain nutrition services referral as needed  Outcome: Progressing  Goal: Oral mucous membranes remain intact  Description: INTERVENTIONS  - Assess oral mucosa and hygiene practices  - Implement preventative oral hygiene regimen  - Implement oral medicated treatments as ordered  - Initiate Nutrition services referral as needed  Outcome: Progressing     Problem: GENITOURINARY - ADULT  Goal: Urinary catheter remains patent  Description: INTERVENTIONS:  - Assess patency of urinary catheter  - If patient has a chronic márquez, consider changing catheter if non-functioning  - Follow guidelines for intermittent irrigation of non-functioning urinary catheter  Outcome: Progressing     Problem: METABOLIC, FLUID AND ELECTROLYTES - ADULT  Goal: Electrolytes maintained within normal limits  Description: INTERVENTIONS:  - Monitor labs and assess patient for signs and symptoms of electrolyte imbalances  - Administer electrolyte replacement as ordered  - Monitor response to electrolyte replacements, including repeat lab results as appropriate  - Instruct patient on fluid and nutrition as appropriate  Outcome: Progressing  Goal: Fluid balance maintained  Description: INTERVENTIONS:  - Monitor labs   - Monitor I/O and WT  - Instruct patient on fluid and nutrition as appropriate  - Assess for signs & symptoms of volume excess or deficit  Outcome: Progressing     Problem: SKIN/TISSUE INTEGRITY - ADULT  Goal: Skin Integrity remains intact(Skin Breakdown Prevention)  Description: Assess:  -Perform Jose assessment every shift  -Clean and moisturize skin every 2hrs  -Inspect skin when repositioning, toileting, and assisting with ADLS  -Assess under medical devices such as tubings every 2hrs  -Assess extremities for adequate circulation and sensation     Bed Management:  -Have minimal linens on bed & keep smooth, unwrinkled  -Change linens as needed when moist or perspiring  -Avoid sitting or lying in one position for more than 2hrs hourwhile in bed  -Keep HOB at 45degrees     Toileting:  -Offer bedside commode  -Assess for incontinence every 2hrs  -Use incontinent care products after each incontinent episode such as foam cleanser    Activity:  -Encourage activity and walks on unit  -Encourage or provide ROM exercises   -Turn and reposition patient every 2 Hours  -Use appropriate equipment to lift or move patient in bed  -Instruct/ Assist with weight shifting every morning when out of bed in chair  -Consider limitation of chair time 2hr hour intervals    Skin Care:  -Avoid use of baby powder, tape, friction and shearing, hot water or constrictive clothing  -Relieve pressure over bony prominences using allevyn  -Do not massage red bony areas    Next Steps:  -Teach patient strategies to minimize risks such as bed alarms   -Consider consults to  interdisciplinary teams such as wound care team  Outcome: Progressing     Problem: MOBILITY - ADULT  Goal: Maintain or return to baseline ADL function  Description: INTERVENTIONS:  -  Assess patient's ability to carry out ADLs; assess patient's baseline for ADL function and identify physical deficits which impact ability to perform ADLs (bathing, care of mouth/teeth, toileting, grooming, dressing, etc.)  - Assess/evaluate cause of self-care deficits   - Assess range of motion  - Assess patient's mobility; develop plan if impaired  - Assess patient's need for assistive devices and provide as appropriate  - Encourage maximum independence but intervene and supervise when necessary  - Involve family in performance of ADLs  - Assess for home care needs following discharge   - Consider OT consult to assist with ADL evaluation and planning for discharge  - Provide patient education as appropriate  Outcome: Progressing  Goal: Maintains/Returns to pre admission functional level  Description: INTERVENTIONS:  - Perform BMAT or MOVE assessment daily.   - Set and communicate daily mobility goal to care team and patient/family/caregiver. - Collaborate with rehabilitation services on mobility goals if consulted  - Perform Range of Motion 3 times a day. - Reposition patient every 2 hours.   - Dangle patient 3 times a day  - Stand patient 3 times a day  - Ambulate patient 3 times a day  - Out of bed to chair 3 times a day   - Out of bed for meals 3 times a day  - Out of bed for toileting  - Record patient progress and toleration of activity level   Outcome: Progressing     Problem: Prexisting or High Potential for Compromised Skin Integrity  Goal: Skin integrity is maintained or improved  Description: INTERVENTIONS:  - Identify patients at risk for skin breakdown  - Assess and monitor skin integrity  - Assess and monitor nutrition and hydration status  - Monitor labs   - Assess for incontinence   - Turn and reposition patient  - Assist with mobility/ambulation  - Relieve pressure over bony prominences  - Avoid friction and shearing  - Provide appropriate hygiene as needed including keeping skin clean and dry  - Evaluate need for skin moisturizer/barrier cream  - Collaborate with interdisciplinary team   - Patient/family teaching  - Consider wound care consult   Outcome: Progressing     Problem: Nutrition/Hydration-ADULT  Goal: Nutrient/Hydration intake appropriate for improving, restoring or maintaining nutritional needs  Description: Monitor and assess patient's nutrition/hydration status for malnutrition. Collaborate with interdisciplinary team and initiate plan and interventions as ordered. Monitor patient's weight and dietary intake as ordered or per policy. Utilize nutrition screening tool and intervene as necessary. Determine patient's food preferences and provide high-protein, high-caloric foods as appropriate.      INTERVENTIONS:  - Monitor oral intake, urinary output, labs, and treatment plans  - Assess nutrition and hydration status and recommend course of action  - Evaluate amount of meals eaten  - Assist patient with eating if necessary   - Allow adequate time for meals  - Recommend/ encourage appropriate diets, oral nutritional supplements, and vitamin/mineral supplements  - Order, calculate, and assess calorie counts as needed  - Recommend, monitor, and adjust tube feedings and TPN/PPN based on assessed needs  - Assess need for intravenous fluids  - Provide specific nutrition/hydration education as appropriate  - Include patient/family/caregiver in decisions related to nutrition  Outcome: Progressing

## 2023-10-22 NOTE — PROGRESS NOTES
[id number] [facility]  Progress Note  Name: Tony Arreola  MRN: [id number]  Unit/Bed#: [address] [place] [address] I Date of Admission: 10/18/2023   Date of Service: 10/22/2023 I Hospital Day: 4    Assessment/Plan   * Acute respiratory failure with hypoxia [facility])  Assessment & Plan  Patient presents with hypoxia, SPO2 in 80s on room air since yesterday in nursing home, was placed on O2 5 L in nursing home to get SPO2 above 90%. Per staff, patient has not been eating or drinking much for at least 2 days, having cough with nectar thick liquids, having audible wheezing, not responding as he normally does for at least 2 days. At baseline patient is able to talk a few words, able to feed self, able to follow simple directions and able to do puzzle and stack blocks per staff. Patient has not been able to do all of the above for about one week. Had a chest x-ray yesterday in nursing home which was unremarkable. Patient was given Levaquin 500mg p.o. prior to coming to ED. No fever in nursing home per staff. Chart reviewed. Patient was hospitalized between 6/30-7/4/2023 for severe sepsis secondary to left-sided pneumonia, acute hypoxic respiratory failure, UTI. Patient was treated with cefepime and cefdinir for 7 days total.  Patient was found to have hilar mass on CT without contrast.  Radiology recommended CT chest with contrast, however daughter declined the test as she would not further pursue any treatment if the mass was concerning. Chest x-ray evidence of increased opacity in the left base  Fever 101.4 in ED, patient was in A-fib with RVR intermittently in ED, meets sepsis criteria. Management as below. Procalcitonin 5.54. Now afebrile, remains hemodynamically stable.   Now saturating mid 90s on room air at rest.  Likely secondary to pneumonia and sepsis with encephalopathy  Patient given Rocephin Zithromax in ED. patient was on Zosyn, de-escalated to ceftriaxone, monitor procalcitonin on ceftriaxone  Speech eval-recommended purée with honey thick liquid, aspiration precaution-10/22 patient noted to be choking on food, discussed with speech therapist, make n.p.o. and will do video barium swallow in a.m. Gentle IV hydration  Xopenex as needed, chest PT as tolerated  Continue supplemental oxygen to maintain sats above 90%. Hypernatremia  Assessment & Plan  Na 151; had been trending up   Repeat BMP down to 147  Suspect secondary to free water deficit  10/22 patient noted to be choking on food and medications, n.p.o. at this time. We will do D5 W at 50 cc an hour while NPO  Repeat in am    Sepsis [facility])  Assessment & Plan  POA, as evidenced by fever, tachycardia, with suspected source of infection right lower lobe pneumonia, likely aspiration in nature. Remains afebrile, hemodynamically stable. Lactic acid normal  Procalcitonin 5.54 initially, downtrending, will repeat in a.m.  UA shows moderate bacteria, no white count, no nitrite, no leukocytes. Chest x-ray with evidence of pneumonia, left base  IV Zosyn transitioned to ceftriaxone on 10/22  Blood cultures negative to date  Repeat CBC, procalcitonin a.m. Dysphagia  Assessment & Plan  Patient noted to be choking and coughing on food and medications on 10/22. Discussed with speech therapist, make n.p.o. at this time and will do video barium swallow in AM.  Discussed with patient daughter. Atrial fibrillation with rapid ventricular response (HCC)  Assessment & Plan  In the setting of sepsis. Episodes of A-fib with RVR during hospitalization in June to July 2023 in the setting of severe sepsis per chart review. History of PACs per chart review. Rate controlled currently  Currently on metoprolol  Echo - EF of 60% with no wall motion abnormality   Optimize electrolytes  Had been on heparin drip   Cardiology following, input appreciated  Discussed with family regarding risk and benefits of long-term anticoagulation.   No history of bleeding in the past.  History of fall previously but currently patient is bedbound. Changed to Eliquis for stroke prevention     Acute encephalopathy  Assessment & Plan  Most likely toxic metabolic encephalopathy due to sepsis. CT head without any acute overload, evidence of prior CVA noted  Treat underlying cause  Patient intermittently lethargic with periods of alertness   Monitor mental status    Elevated troponin  Assessment & Plan  Troponin 450->385  Initial EKG showed sinus tach with premature supraventricular complexes, rate 122, ST depression in lateral leads. Repeat EKG showed A-fib with RVR, rate 140  Likely type II MI secondary to sepsis and  tachycardia  Trend troponin  Echo with preserved EF, no wall motion abnormalities   Patient was started on heparin drip in ED, transitioned to oral Eliquis   Consult cardiology-input appreciated  Continue medical management    Severe dementia [facility])  Assessment & Plan  Nursing home resident. On puréed, nectar thick liquids in nursing home. Essentially wheelchair-bound. Incontinent of bowel and bladder. Patient talks minimally at baseline, Marshallese-speaking only. Continue Aricept  Supportive care  Overall guarded prognosis, discussed with daughter over the phone. Advanced directives verified, patient is DNR/DNI    Urinary retention  Assessment & Plan  Appears to be acute. Similar history in the past  Márquez inserted in ED, initial urine output 1000 mL. Bladder scan showed 800 mL prior to insertion. Continue Márquez catheter  Patient is on DuoNeb 3 times daily and every 6 hours as needed in nursing home. Will change to Xopenex as needed. Urology consultation appreciated - márquez draining to bedside bag     CAD (coronary artery disease)  Assessment & Plan  Status post angioplasty in 1999. On baby aspirin labetalol in nursing home. No longer taking statin.   Continue baby aspirin labetalol  LDL 72    Essential hypertension  Assessment & Plan  On lisinopril, labetalol in nursing home. BP was in 200/100s last week per staff. Currently blood pressure stable  Hold lisinopril  Labetalol was changed to metoprolol  Monitor BP closely    Severe protein-calorie malnutrition (HCC)  Assessment & Plan  Malnutrition Findings:   Adult Malnutrition type: Chronic illness  Adult Degree of Malnutrition: Other severe protein calorie malnutrition  Malnutrition Characteristics: Fat loss, Muscle loss         Nutritional supplements as able          360 Statement: Severe protein calorie malnutrition of chronic illness related to inadequate energy intake as evidenced by hollow orbits, depression at the temples, protruding clavicles. Treated with modified diet and supplements    BMI Findings:  Adult BMI Classifications: Underweight < 18.5        Body mass index is 18.44 kg/m². VTE Pharmacologic Prophylaxis:   Moderate Risk (Score 3-4) - Pharmacological DVT Prophylaxis Ordered: apixaban (Eliquis). Patient Centered Rounds: I performed bedside rounds with nursing staff today. Discussions with Specialists or Other Care Team Provider: nursing, speech therapy     Education and Discussions with Family / Patient: Updated  (daughter) via phone. Total Time Spent on Date of Encounter in care of patient: greater than 45 mins. This time was spent on one or more of the following: performing physical exam; counseling and coordination of care; obtaining or reviewing history; documenting in the medical record; reviewing/ordering tests, medications or procedures; communicating with other healthcare professionals and discussing with patient's family/caregivers. Current Length of Stay: 4 day(s)  Current Patient Status: Inpatient   Certification Statement: The patient will continue to require additional inpatient hospital stay due to IV abx, video barium swallow, márquez cath, repeat labs   Discharge Plan: Anticipate discharge in 48-72 hrs to rehab facility.     Code Status: Level 3 - DNAR and DNI    Subjective:   Patient seen sitting up in bed resting comfortably. He was noted to be coughing and choking after being fed a small amount of pudding with his pills. Nursing reported the patient also was choking and coughing with breakfast this morning. Patient is tracking staff with his eyes, spoke to patient in [facility] was able to answer a few simple questions with yes and no answers. Objective:     Vitals:   Temp (24hrs), Av.8 °F (36.6 °C), Min:97.6 °F (36.4 °C), Max:97.9 °F (36.6 °C)    Temp:  [97.6 °F (36.4 °C)-97.9 °F (36.6 °C)] 97.9 °F (36.6 °C)  HR:  [48-89] 81  Resp:  [18] 18  BP: (118-168)/(64-96) 158/94  SpO2:  [90 %-98 %] 96 %  Body mass index is 18.44 kg/m². Input and Output Summary (last 24 hours): Intake/Output Summary (Last 24 hours) at 10/22/2023 1220  Last data filed at 10/22/2023 1201  Gross per 24 hour   Intake 250 ml   Output 2500 ml   Net -2250 ml       Physical Exam:   Physical Exam  Vitals and nursing note reviewed. Constitutional:       Comments: In, frail cachectic appearing gentleman resting in bed sitting upright. He is tracking staff with his eyes, when spoken to in Telugu able to answer simple yes/no questions. HENT:      Head: Normocephalic. Nose: Nose normal.   Eyes:      Extraocular Movements: Extraocular movements intact. Conjunctiva/sclera: Conjunctivae normal.   Cardiovascular:      Rate and Rhythm: Normal rate. Rhythm irregular. Pulses: Normal pulses. Pulmonary:      Comments: Patient diminished at bases, some coarse rhonchi noted. Patient was coughing, did not expectorate. Abdominal:      General: Bowel sounds are normal. There is no distension. Palpations: Abdomen is soft. Tenderness: There is no abdominal tenderness. Genitourinary:     Comments: Calderon catheter present clear yellow urine draining  Musculoskeletal:      Cervical back: Normal range of motion. Right lower leg: No edema.       Left lower leg: No edema. Comments: Generalized deconditioning   Skin:     General: Skin is warm and dry. Capillary Refill: Capillary refill takes less than 2 seconds. Neurological:      Mental Status: He is alert. Comments: Patient mostly nonverbal, was able to answer a few simple yes/no questions in Yi. Moving all extremities without any difficulty. Psychiatric:      Comments: Pleasant and cooperative with exam          Additional Data:     Labs:  Results from last 7 days   Lab Units 10/22/23  0613 10/19/23  0443 10/18/23  2101   WBC Thousand/uL 6.66   < > 10.36*   HEMOGLOBIN g/dL 12.3   < > 14.1   HEMATOCRIT % 38.6   < > 42.8   PLATELETS Thousands/uL 224   < > 192   BANDS PCT %  --   --  8   LYMPHO PCT %  --   --  6*   MONO PCT %  --   --  7   EOS PCT %  --   --  0    < > = values in this interval not displayed. Results from last 7 days   Lab Units 10/22/23  0613 10/19/23  0443 10/18/23  2101   SODIUM mmol/L 147   < > 142   POTASSIUM mmol/L 3.6   < > 4.3   CHLORIDE mmol/L 113*   < > 107   CO2 mmol/L 26   < > 25   BUN mg/dL 15   < > 37*   CREATININE mg/dL 0.78   < > 1.05   ANION GAP mmol/L 8   < > 10   CALCIUM mg/dL 9.0   < > 9.7   ALBUMIN g/dL  --   --  3.5   TOTAL BILIRUBIN mg/dL  --   --  0.90   ALK PHOS U/L  --   --  61   ALT U/L  --   --  8   AST U/L  --   --  15   GLUCOSE RANDOM mg/dL 113   < > 143*    < > = values in this interval not displayed.      Results from last 7 days   Lab Units 10/18/23  2101   INR  1.30*         Results from last 7 days   Lab Units 10/18/23  2101   HEMOGLOBIN A1C % 5.5     Results from last 7 days   Lab Units 10/20/23  0949 10/19/23  0443 10/18/23  2101   LACTIC ACID mmol/L  --   --  1.6   PROCALCITONIN ng/ml 1.84* 3.86* 5.54*       Lines/Drains:  Invasive Devices       Peripheral Intravenous Line  Duration             Peripheral IV 10/18/23 Left Antecubital 3 days    Peripheral IV 10/18/23 Right;Ventral (anterior) Hand 3 days              Drain  Duration Urethral Catheter Non-latex 12 Fr. 3 days                  Urinary Catheter:  Goal for removal: Voiding trial when ambulation improves               Imaging: No pertinent imaging reviewed. Recent Cultures (last 7 days):   Results from last 7 days   Lab Units 10/18/23  2101 10/18/23  1707   BLOOD CULTURE  No Growth at 72 hrs. No Growth at 72 hrs.  --    URINE CULTURE   --  No Growth <1000 cfu/mL       Last 24 Hours Medication List:   Current Facility-Administered Medications   Medication Dose Route Frequency Provider Last Rate    acetaminophen  650 mg Oral Q6H PRN Cuishaheen Sheikhk, CRNP      apixaban  2.5 mg Oral BID Fayetta Clonts, CRNP      aspirin  81 mg Oral Daily Cuishaheen Gillespie, CRNP      cefTRIAXone  1,000 mg Intravenous Q24H Rony Tolbert, NAHUN      dextrose  50 mL/hr Intravenous Continuous Rony Tolbert, NAHUN      donepezil  5 mg Oral HS Cuishaheen Gillespie, CRNP      famotidine  20 mg Intravenous Q24H [facility] Haileyshaheen Gillespie, NAHUN      glycerin-hypromellose-  1 drop Both Eyes BID Cuishaheen Gillespie, CRNP      levalbuterol  0.63 mg Nebulization Q4H PRN Radha Gillespie, CRNP      levothyroxine  50 mcg Oral Early Morning Cuishaheen Gillespie, CRNP      losartan  50 mg Oral Daily Fayetta Clonts, CRNP      metoprolol tartrate  25 mg Oral Q12H [facility] Fayetta Clonts, CRNP      NIFEdipine  30 mg Oral Daily Fayetta Clonts, CRNP      nitroglycerin  0.4 mg Sublingual Q5 Min PRN Radha Gillespie, CRNP      ondansetron  4 mg Intravenous Q6H PRN Haileyishaheen Gillespie, CRNP      saccharomyces boulardii  250 mg Oral BID Cuishaheen Gillespie, CRNP      senna  1 tablet Oral BID Cuishaheen Gillespie, CRNP      tamsulosin  0.4 mg Oral Daily With [address], OSCARNP          Today, Patient Was Seen By: NAHUN Belle    **Please Note: This note may have been constructed using a voice recognition system. **

## 2023-10-22 NOTE — ASSESSMENT & PLAN NOTE
Nursing home resident. On puréed, nectar thick liquids in nursing home. Essentially wheelchair-bound. Incontinent of bowel and bladder. Patient talks minimally at baseline, Mongolian-speaking only. Continue Aricept  Supportive care  Overall guarded prognosis, discussed with daughter over the phone.   Advanced directives verified, patient is DNR/DNI

## 2023-10-22 NOTE — PLAN OF CARE
Problem: PAIN - ADULT  Goal: Verbalizes/displays adequate comfort level or baseline comfort level  Description: Interventions:  - Encourage patient to monitor pain and request assistance  - Assess pain using appropriate pain scale  - Administer analgesics based on type and severity of pain and evaluate response  - Implement non-pharmacological measures as appropriate and evaluate response  - Consider cultural and social influences on pain and pain management  - Notify physician/advanced practitioner if interventions unsuccessful or patient reports new pain  Outcome: Progressing     Problem: INFECTION - ADULT  Goal: Absence or prevention of progression during hospitalization  Description: INTERVENTIONS:  - Assess and monitor for signs and symptoms of infection  - Monitor lab/diagnostic results  - Monitor all insertion sites, i.e. indwelling lines, tubes, and drains  - Monitor endotracheal if appropriate and nasal secretions for changes in amount and color  - Greenback appropriate cooling/warming therapies per order  - Administer medications as ordered  - Instruct and encourage patient and family to use good hand hygiene technique  - Identify and instruct in appropriate isolation precautions for identified infection/condition  Outcome: Progressing  Goal: Absence of fever/infection during neutropenic period  Description: INTERVENTIONS:  - Monitor WBC    Outcome: Progressing     Problem: SAFETY ADULT  Goal: Patient will remain free of falls  Description: INTERVENTIONS:  - Educate patient/family on patient safety including physical limitations  - Instruct patient to call for assistance with activity   - Consult OT/PT to assist with strengthening/mobility   - Keep Call bell within reach  - Keep bed low and locked with side rails adjusted as appropriate  - Keep care items and personal belongings within reach  - Initiate and maintain comfort rounds  - Make Fall Risk Sign visible to staff  - Offer Toileting every 2 Hours, in advance of need  - Initiate/Maintain bed alarm  - Obtain necessary fall risk management equipment: yes  - Apply yellow socks and bracelet for high fall risk patients  - Consider moving patient to room near nurses station  Outcome: Progressing  Goal: Maintain or return to baseline ADL function  Description: INTERVENTIONS:  -  Assess patient's ability to carry out ADLs; assess patient's baseline for ADL function and identify physical deficits which impact ability to perform ADLs (bathing, care of mouth/teeth, toileting, grooming, dressing, etc.)  - Assess/evaluate cause of self-care deficits   - Assess range of motion  - Assess patient's mobility; develop plan if impaired  - Assess patient's need for assistive devices and provide as appropriate  - Encourage maximum independence but intervene and supervise when necessary  - Involve family in performance of ADLs  - Assess for home care needs following discharge   - Consider OT consult to assist with ADL evaluation and planning for discharge  - Provide patient education as appropriate  Outcome: Progressing  Goal: Maintains/Returns to pre admission functional level  Description: INTERVENTIONS:  - Perform BMAT or MOVE assessment daily.   - Set and communicate daily mobility goal to care team and patient/family/caregiver. - Collaborate with rehabilitation services on mobility goals if consulted  - Perform Range of Motion 3 times a day. - Reposition patient every 2 hours.   - Dangle patient 3 times a day  - Stand patient 3 times a day  - Ambulate patient 3 times a day  - Out of bed to chair 3 times a day   - Out of bed for meals 3 times a day  - Out of bed for toileting  - Record patient progress and toleration of activity level   Outcome: Progressing     Problem: DISCHARGE PLANNING  Goal: Discharge to home or other facility with appropriate resources  Description: INTERVENTIONS:  - Identify barriers to discharge w/patient and caregiver  - Arrange for needed discharge resources and transportation as appropriate  - Identify discharge learning needs (meds, wound care, etc.)  - Arrange for interpretive services to assist at discharge as needed  - Refer to Case Management Department for coordinating discharge planning if the patient needs post-hospital services based on physician/advanced practitioner order or complex needs related to functional status, cognitive ability, or social support system  Outcome: Progressing     Problem: Knowledge Deficit  Goal: Patient/family/caregiver demonstrates understanding of disease process, treatment plan, medications, and discharge instructions  Description: Complete learning assessment and assess knowledge base.   Interventions:  - Provide teaching at level of understanding  - Provide teaching via preferred learning methods  Outcome: Progressing     Problem: CARDIOVASCULAR - ADULT  Goal: Maintains optimal cardiac output and hemodynamic stability  Description: INTERVENTIONS:  - Monitor I/O, vital signs and rhythm  - Monitor for S/S and trends of decreased cardiac output  - Administer and titrate ordered vasoactive medications to optimize hemodynamic stability  - Assess quality of pulses, skin color and temperature  - Assess for signs of decreased coronary artery perfusion  - Instruct patient to report change in severity of symptoms  Outcome: Progressing  Goal: Absence of cardiac dysrhythmias or at baseline rhythm  Description: INTERVENTIONS:  - Continuous cardiac monitoring, vital signs, obtain 12 lead EKG if ordered  - Administer antiarrhythmic and heart rate control medications as ordered  - Monitor electrolytes and administer replacement therapy as ordered  Outcome: Progressing     Problem: RESPIRATORY - ADULT  Goal: Achieves optimal ventilation and oxygenation  Description: INTERVENTIONS:  - Assess for changes in respiratory status  - Assess for changes in mentation and behavior  - Position to facilitate oxygenation and minimize respiratory effort  - Oxygen administered by appropriate delivery if ordered  - Initiate smoking cessation education as indicated  - Encourage broncho-pulmonary hygiene including cough, deep breathe, Incentive Spirometry  - Assess the need for suctioning and aspirate as needed  - Assess and instruct to report SOB or any respiratory difficulty  - Respiratory Therapy support as indicated  Outcome: Progressing     Problem: GASTROINTESTINAL - ADULT  Goal: Maintains adequate nutritional intake  Description: INTERVENTIONS:  - Monitor percentage of each meal consumed  - Identify factors contributing to decreased intake, treat as appropriate  - Assist with meals as needed  - Monitor I&O, weight, and lab values if indicated  - Obtain nutrition services referral as needed  Outcome: Progressing  Goal: Oral mucous membranes remain intact  Description: INTERVENTIONS  - Assess oral mucosa and hygiene practices  - Implement preventative oral hygiene regimen  - Implement oral medicated treatments as ordered  - Initiate Nutrition services referral as needed  Outcome: Progressing     Problem: GENITOURINARY - ADULT  Goal: Urinary catheter remains patent  Description: INTERVENTIONS:  - Assess patency of urinary catheter  - If patient has a chronic márquez, consider changing catheter if non-functioning  - Follow guidelines for intermittent irrigation of non-functioning urinary catheter  Outcome: Progressing     Problem: METABOLIC, FLUID AND ELECTROLYTES - ADULT  Goal: Electrolytes maintained within normal limits  Description: INTERVENTIONS:  - Monitor labs and assess patient for signs and symptoms of electrolyte imbalances  - Administer electrolyte replacement as ordered  - Monitor response to electrolyte replacements, including repeat lab results as appropriate  - Instruct patient on fluid and nutrition as appropriate  Outcome: Progressing  Goal: Fluid balance maintained  Description: INTERVENTIONS:  - Monitor labs   - Monitor I/O and WT  - Instruct patient on fluid and nutrition as appropriate  - Assess for signs & symptoms of volume excess or deficit  Outcome: Progressing     Problem: SKIN/TISSUE INTEGRITY - ADULT  Goal: Skin Integrity remains intact(Skin Breakdown Prevention)  Description: Assess:  -Perform Jose assessment every shift  -Clean and moisturize skin every shift or as needed  -Inspect skin when repositioning, toileting, and assisting with ADLS  -Assess under medical devices such as wires every 2-4 hr  -Assess extremities for adequate circulation and sensation     Bed Management:  -Have minimal linens on bed & keep smooth, unwrinkled  -Change linens as needed when moist or perspiring  -Avoid sitting or lying in one position for more than 2 hours while in bed  -Keep HOB at 45 degrees     Toileting:  -Offer bedside commode  -Assess for incontinence every 2 hr  -Use incontinent care products after each incontinent episode such as foam cleanser    Activity:  -Mobilize patient 3 times a day  -Encourage activity and walks on unit  -Encourage or provide ROM exercises   -Turn and reposition patient every 2 Hours  -Use appropriate equipment to lift or move patient in bed  -Instruct/ Assist with weight shifting every 1 hr when out of bed in chair  -Consider limitation of chair time 2 hour intervals    Skin Care:  -Avoid use of baby powder, tape, friction and shearing, hot water or constrictive clothing  -Relieve pressure over bony prominences using waffle cushion, foam cushion  -Do not massage red bony areas    Next Steps:  -Teach patient strategies to minimize risks such as weight shifting   -Consider consults to  interdisciplinary teams such as wound care  Outcome: Progressing     Problem: MOBILITY - ADULT  Goal: Maintain or return to baseline ADL function  Description: INTERVENTIONS:  -  Assess patient's ability to carry out ADLs; assess patient's baseline for ADL function and identify physical deficits which impact ability to perform ADLs (bathing, care of mouth/teeth, toileting, grooming, dressing, etc.)  - Assess/evaluate cause of self-care deficits   - Assess range of motion  - Assess patient's mobility; develop plan if impaired  - Assess patient's need for assistive devices and provide as appropriate  - Encourage maximum independence but intervene and supervise when necessary  - Involve family in performance of ADLs  - Assess for home care needs following discharge   - Consider OT consult to assist with ADL evaluation and planning for discharge  - Provide patient education as appropriate  Outcome: Progressing  Goal: Maintains/Returns to pre admission functional level  Description: INTERVENTIONS:  - Perform BMAT or MOVE assessment daily.   - Set and communicate daily mobility goal to care team and patient/family/caregiver. - Collaborate with rehabilitation services on mobility goals if consulted  - Perform Range of Motion 3 times a day. - Reposition patient every 2 hours.   - Dangle patient 3 times a day  - Stand patient 3 times a day  - Ambulate patient 3 times a day  - Out of bed to chair 3 times a day   - Out of bed for meals 3 times a day  - Out of bed for toileting  - Record patient progress and toleration of activity level   Outcome: Progressing     Problem: Prexisting or High Potential for Compromised Skin Integrity  Goal: Skin integrity is maintained or improved  Description: INTERVENTIONS:  - Identify patients at risk for skin breakdown  - Assess and monitor skin integrity  - Assess and monitor nutrition and hydration status  - Monitor labs   - Assess for incontinence   - Turn and reposition patient  - Assist with mobility/ambulation  - Relieve pressure over bony prominences  - Avoid friction and shearing  - Provide appropriate hygiene as needed including keeping skin clean and dry  - Evaluate need for skin moisturizer/barrier cream  - Collaborate with interdisciplinary team   - Patient/family teaching  - Consider wound care consult   Outcome: Progressing     Problem: Nutrition/Hydration-ADULT  Goal: Nutrient/Hydration intake appropriate for improving, restoring or maintaining nutritional needs  Description: Monitor and assess patient's nutrition/hydration status for malnutrition. Collaborate with interdisciplinary team and initiate plan and interventions as ordered. Monitor patient's weight and dietary intake as ordered or per policy. Utilize nutrition screening tool and intervene as necessary. Determine patient's food preferences and provide high-protein, high-caloric foods as appropriate.      INTERVENTIONS:  - Monitor oral intake, urinary output, labs, and treatment plans  - Assess nutrition and hydration status and recommend course of action  - Evaluate amount of meals eaten  - Assist patient with eating if necessary   - Allow adequate time for meals  - Recommend/ encourage appropriate diets, oral nutritional supplements, and vitamin/mineral supplements  - Order, calculate, and assess calorie counts as needed  - Recommend, monitor, and adjust tube feedings and TPN/PPN based on assessed needs  - Assess need for intravenous fluids  - Provide specific nutrition/hydration education as appropriate  - Include patient/family/caregiver in decisions related to nutrition  Outcome: Progressing

## 2023-10-22 NOTE — PROGRESS NOTES
Progress Note - Urology      Patient: Eliazar Jade   : 1940 Sex: male   MRN: [id number]     CSN: [id number]  Unit/Bed#: [address] [place] [address]     SUBJECTIVE:   Patient seen on morning rounds  Once again sleeping unarousable  No family members present      Objective   Vitals: /94   Pulse 81   Temp 97.9 °F (36.6 °C)   Resp 18   Ht 5' 7" (1.702 m)   Wt 53.4 kg (117 lb 11.6 oz)   SpO2 96%   BMI 18.44 kg/m²     I/O last 24 hours: In: 250 [P.O.:240;  I.V.:10]  Out: 1400 [Urine:1400]      Physical Exam:   General sleeping unarousable  Normocephalic atraumatic PERRLA  Lungs clear bilaterally  Cardiac normal S1 normal S2  Abdomen soft, flank pain  Calderon draining clear urine  Extremities no edema      Lab Results: CBC:   Lab Results   Component Value Date    WBC 6.66 10/22/2023    HGB 12.3 10/22/2023    HCT 38.6 10/22/2023    MCV 90 10/22/2023     10/22/2023    RBC 4.31 10/22/2023    MCH 28.5 10/22/2023    MCHC 31.9 10/22/2023    RDW 14.5 10/22/2023    MPV 10.7 10/22/2023    NRBC 0 2023     CMP:   Lab Results   Component Value Date     (H) 10/22/2023    CO2 26 10/22/2023    BUN 15 10/22/2023    CREATININE 0.78 10/22/2023    CALCIUM 9.0 10/22/2023    AST 15 10/18/2023    ALT 8 10/18/2023    ALKPHOS 61 10/18/2023    EGFR 84 10/22/2023     Urinalysis:   Lab Results   Component Value Date    COLORU Yellow 10/18/2023    CLARITYU Clear 10/18/2023    SPECGRAV 1.020 10/18/2023    PHUR 6.0 10/18/2023    LEUKOCYTESUR Negative 10/18/2023    NITRITE Negative 10/18/2023    GLUCOSEU Negative 10/18/2023    KETONESU Negative 10/18/2023    BILIRUBINUR Negative 10/18/2023    BLOODU Negative 10/18/2023     Urine Culture:   Lab Results   Component Value Date    URINECX No Growth <1000 cfu/mL 10/18/2023     PSA: No results found for: "PSA"      Assessment/ Plan:  Urinary retention  Continue Calderon catheter at this time  On tamsulosin 0.4 mg  Could attempt voiding trial if patient's affect improves at this point still somnolent confused did not feel voiding trial should be attempted          Venancio Lyons MD

## 2023-10-22 NOTE — ASSESSMENT & PLAN NOTE
POA, as evidenced by fever, tachycardia, with suspected source of infection right lower lobe pneumonia, likely aspiration in nature. Remains afebrile, hemodynamically stable. Lactic acid normal  Procalcitonin 5.54 initially, downtrending, will repeat in a.m.  UA shows moderate bacteria, no white count, no nitrite, no leukocytes. Chest x-ray with evidence of pneumonia, left base  IV Zosyn transitioned to ceftriaxone on 10/22  Blood cultures negative to date  Repeat CBC, procalcitonin a.m.

## 2023-10-23 ENCOUNTER — APPOINTMENT (INPATIENT)
Dept: RADIOLOGY | Facility: HOSPITAL | Age: 83
DRG: 871 | End: 2023-10-23
Payer: MEDICARE

## 2023-10-23 LAB
ANION GAP SERPL CALCULATED.3IONS-SCNC: 8 MMOL/L
BUN SERPL-MCNC: 14 MG/DL (ref 5–25)
CALCIUM SERPL-MCNC: 8.6 MG/DL (ref 8.4–10.2)
CHLORIDE SERPL-SCNC: 106 MMOL/L (ref 96–108)
CO2 SERPL-SCNC: 25 MMOL/L (ref 21–32)
CREAT SERPL-MCNC: 0.8 MG/DL (ref 0.6–1.3)
ERYTHROCYTE [DISTWIDTH] IN BLOOD BY AUTOMATED COUNT: 14.4 % (ref 11.6–15.1)
GFR SERPL CREATININE-BSD FRML MDRD: 83 ML/MIN/1.73SQ M
GLUCOSE SERPL-MCNC: 277 MG/DL (ref 65–140)
HCT VFR BLD AUTO: 39.6 % (ref 36.5–49.3)
HGB BLD-MCNC: 12.7 G/DL (ref 12–17)
MAGNESIUM SERPL-MCNC: 1.7 MG/DL (ref 1.9–2.7)
MCH RBC QN AUTO: 28.9 PG (ref 26.8–34.3)
MCHC RBC AUTO-ENTMCNC: 32.1 G/DL (ref 31.4–37.4)
MCV RBC AUTO: 90 FL (ref 82–98)
PHOSPHATE SERPL-MCNC: 2.4 MG/DL (ref 2.3–4.1)
PLATELET # BLD AUTO: 230 THOUSANDS/UL (ref 149–390)
PMV BLD AUTO: 10.7 FL (ref 8.9–12.7)
POTASSIUM SERPL-SCNC: 3.3 MMOL/L (ref 3.5–5.3)
PROCALCITONIN SERPL-MCNC: 0.21 NG/ML
RBC # BLD AUTO: 4.4 MILLION/UL (ref 3.88–5.62)
SODIUM SERPL-SCNC: 139 MMOL/L (ref 135–147)
WBC # BLD AUTO: 6.57 THOUSAND/UL (ref 4.31–10.16)

## 2023-10-23 PROCEDURE — 83735 ASSAY OF MAGNESIUM: CPT | Performed by: NURSE PRACTITIONER

## 2023-10-23 PROCEDURE — 85027 COMPLETE CBC AUTOMATED: CPT | Performed by: NURSE PRACTITIONER

## 2023-10-23 PROCEDURE — 84100 ASSAY OF PHOSPHORUS: CPT | Performed by: NURSE PRACTITIONER

## 2023-10-23 PROCEDURE — 99232 SBSQ HOSP IP/OBS MODERATE 35: CPT

## 2023-10-23 PROCEDURE — 71045 X-RAY EXAM CHEST 1 VIEW: CPT

## 2023-10-23 PROCEDURE — 84145 PROCALCITONIN (PCT): CPT | Performed by: NURSE PRACTITIONER

## 2023-10-23 PROCEDURE — 80048 BASIC METABOLIC PNL TOTAL CA: CPT | Performed by: NURSE PRACTITIONER

## 2023-10-23 PROCEDURE — 92611 MOTION FLUOROSCOPY/SWALLOW: CPT

## 2023-10-23 PROCEDURE — 74230 X-RAY XM SWLNG FUNCJ C+: CPT

## 2023-10-23 RX ORDER — POTASSIUM CHLORIDE 20 MEQ/1
40 TABLET, EXTENDED RELEASE ORAL 2 TIMES DAILY
Status: DISCONTINUED | OUTPATIENT
Start: 2023-10-23 | End: 2023-10-23

## 2023-10-23 RX ORDER — MAGNESIUM SULFATE HEPTAHYDRATE 40 MG/ML
2 INJECTION, SOLUTION INTRAVENOUS ONCE
Status: COMPLETED | OUTPATIENT
Start: 2023-10-23 | End: 2023-10-23

## 2023-10-23 RX ORDER — LANOLIN ALCOHOL/MO/W.PET/CERES
400 CREAM (GRAM) TOPICAL 2 TIMES DAILY
Status: DISCONTINUED | OUTPATIENT
Start: 2023-10-23 | End: 2023-10-23

## 2023-10-23 RX ORDER — POTASSIUM CHLORIDE 14.9 MG/ML
20 INJECTION INTRAVENOUS ONCE
Status: COMPLETED | OUTPATIENT
Start: 2023-10-23 | End: 2023-10-23

## 2023-10-23 RX ADMIN — MAGNESIUM SULFATE HEPTAHYDRATE 2 G: 40 INJECTION, SOLUTION INTRAVENOUS at 12:17

## 2023-10-23 RX ADMIN — METOPROLOL TARTRATE 25 MG: 25 TABLET, FILM COATED ORAL at 21:33

## 2023-10-23 RX ADMIN — FAMOTIDINE 20 MG: 10 INJECTION, SOLUTION INTRAVENOUS at 10:04

## 2023-10-23 RX ADMIN — GLYCERIN, HYPROMELLOSE, POLYETHYLENE GLYCOL 1 DROP: .2; .2; 1 LIQUID OPHTHALMIC at 18:08

## 2023-10-23 RX ADMIN — CEFTRIAXONE 1000 MG: 1 INJECTION, SOLUTION INTRAVENOUS at 15:32

## 2023-10-23 RX ADMIN — DEXTROSE 50 ML/HR: 5 SOLUTION INTRAVENOUS at 12:17

## 2023-10-23 RX ADMIN — DONEPEZIL HYDROCHLORIDE 5 MG: 5 TABLET ORAL at 21:33

## 2023-10-23 RX ADMIN — TAMSULOSIN HYDROCHLORIDE 0.4 MG: 0.4 CAPSULE ORAL at 17:56

## 2023-10-23 RX ADMIN — Medication 250 MG: at 17:56

## 2023-10-23 RX ADMIN — SENNOSIDES 8.6 MG: 8.6 TABLET, FILM COATED ORAL at 17:56

## 2023-10-23 RX ADMIN — APIXABAN 2.5 MG: 2.5 TABLET, FILM COATED ORAL at 17:56

## 2023-10-23 RX ADMIN — POTASSIUM CHLORIDE 20 MEQ: 14.9 INJECTION, SOLUTION INTRAVENOUS at 14:59

## 2023-10-23 NOTE — PROGRESS NOTES
Progress Note - Urology      Patient: Binu Carlos   : 1940 Sex: male   MRN: [id number]     CSN: [id number]  Unit/Bed#: [address] [place] [address]     SUBJECTIVE:   Patient seen on evening rounds no change still somnolent      Objective   Vitals: BP (!) 178/107   Pulse 89   Temp 98.1 °F (36.7 °C)   Resp 20   Ht 5' 7" (1.702 m)   Wt 53.4 kg (117 lb 11.6 oz)   SpO2 96%   BMI 18.44 kg/m²     I/O last 24 hours:   In: -   Out: 650 [Urine:650]      Physical Exam:   General confused  Normocephalic atraumatic PERRLA  Lungs clear bilaterally  Cardiac normal S1 normal S2  Abdomen soft, flank pain  Extremities no edema      Lab Results: CBC:   Lab Results   Component Value Date    WBC 6.57 10/23/2023    HGB 12.7 10/23/2023    HCT 39.6 10/23/2023    MCV 90 10/23/2023     10/23/2023    RBC 4.40 10/23/2023    MCH 28.9 10/23/2023    MCHC 32.1 10/23/2023    RDW 14.4 10/23/2023    MPV 10.7 10/23/2023    NRBC 0 2023     CMP:   Lab Results   Component Value Date     10/23/2023    CO2 25 10/23/2023    BUN 14 10/23/2023    CREATININE 0.80 10/23/2023    CALCIUM 8.6 10/23/2023    AST 15 10/18/2023    ALT 8 10/18/2023    ALKPHOS 61 10/18/2023    EGFR 83 10/23/2023     Urinalysis:   Lab Results   Component Value Date    COLORU Yellow 10/18/2023    CLARITYU Clear 10/18/2023    SPECGRAV 1.020 10/18/2023    PHUR 6.0 10/18/2023    LEUKOCYTESUR Negative 10/18/2023    NITRITE Negative 10/18/2023    GLUCOSEU Negative 10/18/2023    KETONESU Negative 10/18/2023    BILIRUBINUR Negative 10/18/2023    BLOODU Negative 10/18/2023     Urine Culture:   Lab Results   Component Value Date    URINECX No Growth <1000 cfu/mL 10/18/2023     PSA: No results found for: "PSA"      Assessment/ Plan:  Dementia  Retention  Not feel candidate for voiding trial at this time would attempt that in a few weeks pending mental status          Renee Grijalva MD

## 2023-10-23 NOTE — PROGRESS NOTES
[id number] [facility]  Progress Note  Name: Janett Garcia  MRN: [id number]  Unit/Bed#: [address] [place] [address] Date of Admission: 10/18/2023   Date of Service: 10/23/2023 I Hospital Day: 5    Assessment/Plan   Dysphagia  Assessment & Plan  Patient noted to be choking and coughing on food and medications on 10/22. Speech therapist consult. VBS done   Recommended puréed diet with honey thick liquid    Hypernatremia  Assessment & Plan  Na 151; had been trending up   Repeat BMP down to 147  Suspect secondary to free water deficit  10/22 patient noted to be choking on food and medications, n.p.o. at this time. We will do D5 W at 50 cc an hour while NPO  Repeat in am    Severe protein-calorie malnutrition ([address])  Assessment & Plan  Malnutrition Findings:   Adult Malnutrition type: Chronic illness  Adult Degree of Malnutrition: Other severe protein calorie malnutrition  Malnutrition Characteristics: Fat loss, Muscle loss         Nutritional supplements as able          360 Statement: Severe protein calorie malnutrition of chronic illness related to inadequate energy intake as evidenced by hollow orbits, depression at the temples, protruding clavicles. Treated with modified diet and supplements    BMI Findings:  Adult BMI Classifications: Underweight < 18.5        Body mass index is 18.44 kg/m². CAD (coronary artery disease)  Assessment & Plan  Status post angioplasty in 1999. On baby aspirin labetalol in nursing home. No longer taking statin. Continue baby aspirin labetalol  LDL 72    Elevated troponin  Assessment & Plan  Troponin 450->385  Initial EKG showed sinus tach with premature supraventricular complexes, rate 122, ST depression in lateral leads.   Repeat EKG showed A-fib with RVR, rate 140  Likely type II MI secondary to sepsis and  tachycardia  Trend troponin  Echo with preserved EF, no wall motion abnormalities   Patient was started on heparin drip in ED, transitioned to oral Eliquis Consult cardiology-input appreciated  Continue medical management    Atrial fibrillation with rapid ventricular response (HCC)  Assessment & Plan  In the setting of sepsis. Episodes of A-fib with RVR during hospitalization in June to July 2023 in the setting of severe sepsis per chart review. History of PACs per chart review. Rate controlled currently  Currently on metoprolol  Echo - EF of 60% with no wall motion abnormality   Optimize electrolytes  Had been on heparin drip   Cardiology following, input appreciated  Discussed with family regarding risk and benefits of long-term anticoagulation. No history of bleeding in the past.  History of fall previously but currently patient is bedbound. Changed to Eliquis for stroke prevention     Urinary retention  Assessment & Plan  Appears to be acute. Similar history in the past  Márquez inserted in ED, initial urine output 1000 mL. Bladder scan showed 800 mL prior to insertion. Continue Márquez catheter  Patient is on DuoNeb 3 times daily and every 6 hours as needed in nursing home. Will change to Xopenex as needed. Urology consultation appreciated - márquez draining to bedside bag     Sepsis [facility])  Assessment & Plan  POA, as evidenced by fever, tachycardia, with suspected source of infection right lower lobe pneumonia, likely aspiration in nature. Remains afebrile, hemodynamically stable. Lactic acid normal  Procalcitonin 5.54 initially, down trended to 0.21  UA shows moderate bacteria, no white count, no nitrite, no leukocytes. Chest x-ray with evidence of pneumonia, left base  IV Zosyn transitioned to ceftriaxone on 10/22  Blood cultures negative to date      Essential hypertension  Assessment & Plan  On lisinopril, labetalol in nursing home. BP was in 200/100s last week per staff. Currently blood pressure stable  Hold lisinopril  Labetalol was changed to metoprolol  Monitor BP closely    Severe dementia [facility])  Assessment & Plan  Nursing home resident.  On puréed, nectar thick liquids in nursing home. Essentially wheelchair-bound. Incontinent of bowel and bladder. Patient talks minimally at baseline, Syrian-speaking only. Continue Aricept  Supportive care  Overall guarded prognosis, discussed with daughter over the phone. Advanced directives verified, patient is DNR/DNI    Acute encephalopathy  Assessment & Plan  Most likely toxic metabolic encephalopathy due to sepsis. CT head without any acute overload, evidence of prior CVA noted  Treat underlying cause  Patient intermittently lethargic with periods of alertness   Monitor mental status    * Acute respiratory failure with hypoxia [facility])  Assessment & Plan  Patient presents with hypoxia, SPO2 in 80s on room air since yesterday in nursing home, was placed on O2 5 L in nursing home to get SPO2 above 90%. Per staff, patient has not been eating or drinking much for at least 2 days, having cough with nectar thick liquids, having audible wheezing, not responding as he normally does for at least 2 days. At baseline patient is able to talk a few words, able to feed self, able to follow simple directions and able to do puzzle and stack blocks per staff. Patient has not been able to do all of the above for about one week. Had a chest x-ray yesterday in nursing home which was unremarkable. Patient was given Levaquin 500mg p.o. prior to coming to ED. No fever in nursing home per staff. Chart reviewed. Patient was hospitalized between 6/30-7/4/2023 for severe sepsis secondary to left-sided pneumonia, acute hypoxic respiratory failure, UTI. Patient was treated with cefepime and cefdinir for 7 days total.  Patient was found to have hilar mass on CT without contrast.  Radiology recommended CT chest with contrast, however daughter declined the test as she would not further pursue any treatment if the mass was concerning.   Chest x-ray evidence of increased opacity in the left base  Fever 101.4 in ED, patient was in A-fib with RVR intermittently in ED, meets sepsis criteria. Management as below. Procalcitonin 5.54. Now afebrile, remains hemodynamically stable. Now saturating mid 90s on room air at rest.  Likely secondary to pneumonia and sepsis with encephalopathy  Patient given Rocephin Zithromax in ED. patient was on Zosyn, de-escalated to ceftriaxone, monitor procalcitonin on ceftriaxone  Speech eval-recommended purée with honey thick liquid, aspiration precaution-10/22 patient noted to be choking on food, discussed with speech therapist, make n.p.o. and will do video barium swallow in a.m. Gentle IV hydration  Xopenex as needed, chest PT as tolerated  Continue supplemental oxygen to maintain sats above 90%. VTE Pharmacologic Prophylaxis:   High Risk (Score >/= 5) - Pharmacological DVT Prophylaxis Ordered: apixaban (Eliquis). Sequential Compression Devices Ordered. Patient Centered Rounds: I performed bedside rounds with nursing staff today. Discussions with Specialists or Other Care Team Provider: Yes    Education and Discussions with Family / Patient: Attempted to update  (daughter) via phone. Left voicemail. Total Time Spent on Date of Encounter in care of patient: 35 mins. This time was spent on one or more of the following: performing physical exam; counseling and coordination of care; obtaining or reviewing history; documenting in the medical record; reviewing/ordering tests, medications or procedures; communicating with other healthcare professionals and discussing with patient's family/caregivers. Current Length of Stay: 5 day(s)  Current Patient Status: Inpatient   Certification Statement: The patient will continue to require additional inpatient hospital stay due to Calderon catheter pending voiding trial  Discharge Plan:  TBD    Code Status: Level 3 - DNAR and DNI    Subjective:   Patient seen and examined at bedside sleeping but easily arousable.   At baseline, with no sign of distress noted. Awaiting speech therapy for VBS . Objective:     Vitals:   Temp (24hrs), Av.9 °F (36.6 °C), Min:97.3 °F (36.3 °C), Max:98.5 °F (36.9 °C)    Temp:  [97.3 °F (36.3 °C)-98.5 °F (36.9 °C)] 98.5 °F (36.9 °C)  HR:  [70-88] 76  Resp:  [17-19] 19  BP: (160-178)/() 161/86  SpO2:  [94 %-97 %] 96 %  Body mass index is 18.44 kg/m². Input and Output Summary (last 24 hours): Intake/Output Summary (Last 24 hours) at 10/23/2023 1455  Last data filed at 10/23/2023 0831  Gross per 24 hour   Intake --   Output 650 ml   Net -650 ml       Physical Exam:   Physical Exam  Constitutional:       Appearance: He is cachectic. He is ill-appearing. HENT:      Head: Normocephalic. Mouth/Throat:      Mouth: Mucous membranes are dry. Eyes:      General: No scleral icterus. Cardiovascular:      Rate and Rhythm: Normal rate. Pulmonary:      Effort: Pulmonary effort is normal.      Breath sounds: Decreased air movement present. Abdominal:      General: Bowel sounds are normal.      Tenderness: There is no abdominal tenderness. Musculoskeletal:      Right lower leg: No edema. Left lower leg: No edema. Skin:     Capillary Refill: Capillary refill takes 2 to 3 seconds. Coloration: Skin is pale. Neurological:      Mental Status: He is easily aroused. Mental status is at baseline. Motor: Weakness present. Psychiatric:         Behavior: Behavior normal.          Additional Data:     Labs:  Results from last 7 days   Lab Units 10/23/23  0404 10/19/23  0443 10/18/23  2101   WBC Thousand/uL 6.57   < > 10.36*   HEMOGLOBIN g/dL 12.7   < > 14.1   HEMATOCRIT % 39.6   < > 42.8   PLATELETS Thousands/uL 230   < > 192   BANDS PCT %  --   --  8   LYMPHO PCT %  --   --  6*   MONO PCT %  --   --  7   EOS PCT %  --   --  0    < > = values in this interval not displayed.      Results from last 7 days   Lab Units 10/23/23  0404 10/19/23  0443 10/18/23  2101   SODIUM mmol/L 139   < > 142   POTASSIUM mmol/L 3.3*   < > 4.3   CHLORIDE mmol/L 106   < > 107   CO2 mmol/L 25   < > 25   BUN mg/dL 14   < > 37*   CREATININE mg/dL 0.80   < > 1.05   ANION GAP mmol/L 8   < > 10   CALCIUM mg/dL 8.6   < > 9.7   ALBUMIN g/dL  --   --  3.5   TOTAL BILIRUBIN mg/dL  --   --  0.90   ALK PHOS U/L  --   --  61   ALT U/L  --   --  8   AST U/L  --   --  15   GLUCOSE RANDOM mg/dL 277*   < > 143*    < > = values in this interval not displayed. Results from last 7 days   Lab Units 10/18/23  2101   INR  1.30*         Results from last 7 days   Lab Units 10/18/23  2101   HEMOGLOBIN A1C % 5.5     Results from last 7 days   Lab Units 10/23/23  0404 10/20/23  0949 10/19/23  0443 10/18/23  2101   LACTIC ACID mmol/L  --   --   --  1.6   PROCALCITONIN ng/ml 0.21 1.84* 3.86* 5.54*       Lines/Drains:  Invasive Devices       Peripheral Intravenous Line  Duration             Peripheral IV 10/18/23 Left Antecubital 4 days    Peripheral IV 10/18/23 Right;Ventral (anterior) Hand 4 days              Drain  Duration             Urethral Catheter Non-latex 12 Fr. 4 days                  Urinary Catheter:  Goal for removal: Voiding trial when ambulation improves               Imaging: No pertinent imaging reviewed. Recent Cultures (last 7 days):   Results from last 7 days   Lab Units 10/18/23  2101 10/18/23  1707   BLOOD CULTURE  No Growth After 4 Days. No Growth After 4 Days.   --    URINE CULTURE   --  No Growth <1000 cfu/mL       Last 24 Hours Medication List:   Current Facility-Administered Medications   Medication Dose Route Frequency Provider Last Rate    acetaminophen  650 mg Oral Q6H PRN NAHUN Mead      apixaban  2.5 mg Oral BID NAHUN Benites      aspirin  81 mg Oral Daily NAHUN Mead      cefTRIAXone  1,000 mg Intravenous Q24H NAHUN Lynne 1,000 mg (10/22/23 1324)    donepezil  5 mg Oral HS NAHUN Mead      famotidine  20 mg Intravenous Q24H [address]  NAHUN Mead      glycerin-hypromellose-PEG 400  1 drop Both Eyes BID Radha Gillespie, OSCARNP      hydrALAZINE  10 mg Intravenous Q6H PRN Neelima Workman MD      influenza vaccine  0.7 mL Intramuscular Once Yudi Lazaro, NAHUN      levalbuterol  0.63 mg Nebulization Q4H PRN Radha Gillespie, CRNP      levothyroxine  50 mcg Oral Early Morning Cumelvin Gillespie, CRNP      losartan  50 mg Oral Daily Darion Cradle, CRNP      metoprolol tartrate  25 mg Oral Q12H [facility], CRNP      NIFEdipine  30 mg Oral Daily Darion Cradle, CRNP      nitroglycerin  0.4 mg Sublingual Q5 Min PRN Radha Gillespie, OSCARNP      ondansetron  4 mg Intravenous Q6H PRN Radha Gillespie, CRNP      potassium chloride  20 mEq Intravenous Once Olayide D Olaniyan, CRNP      saccharomyces boulardii  250 mg Oral BID Radha Gillespie, CRNP      senna  1 tablet Oral BID Radha Gillespie, CRNP      tamsulosin  0.4 mg Oral Daily With [address], NAHUN          Today, Patient Was Seen By: NAHUN Rocha    **Please Note: This note may have been constructed using a voice recognition system. **

## 2023-10-23 NOTE — ASSESSMENT & PLAN NOTE
POA, as evidenced by fever, tachycardia, with suspected source of infection right lower lobe pneumonia, likely aspiration in nature. Remains afebrile, hemodynamically stable. Lactic acid normal  Procalcitonin 5.54 initially, down trended to 0.21  UA shows moderate bacteria, no white count, no nitrite, no leukocytes.    Chest x-ray with evidence of pneumonia, left base  IV Zosyn transitioned to ceftriaxone on 10/22  Blood cultures negative to date

## 2023-10-23 NOTE — ASSESSMENT & PLAN NOTE
Nursing home resident. On puréed, nectar thick liquids in nursing home. Essentially wheelchair-bound. Incontinent of bowel and bladder. Patient talks minimally at baseline, Arabic-speaking only. Continue Aricept  Supportive care  Overall guarded prognosis, discussed with daughter over the phone.   Advanced directives verified, patient is DNR/DNI

## 2023-10-23 NOTE — PLAN OF CARE
Problem: PAIN - ADULT  Goal: Verbalizes/displays adequate comfort level or baseline comfort level  Description: Interventions:  - Encourage patient to monitor pain and request assistance  - Assess pain using appropriate pain scale  - Administer analgesics based on type and severity of pain and evaluate response  - Implement non-pharmacological measures as appropriate and evaluate response  - Consider cultural and social influences on pain and pain management  - Notify physician/advanced practitioner if interventions unsuccessful or patient reports new pain  Outcome: Progressing     Problem: INFECTION - ADULT  Goal: Absence or prevention of progression during hospitalization  Description: INTERVENTIONS:  - Assess and monitor for signs and symptoms of infection  - Monitor lab/diagnostic results  - Monitor all insertion sites, i.e. indwelling lines, tubes, and drains  - Monitor endotracheal if appropriate and nasal secretions for changes in amount and color  - [place] appropriate cooling/warming therapies per order  - Administer medications as ordered  - Instruct and encourage patient and family to use good hand hygiene technique  - Identify and instruct in appropriate isolation precautions for identified infection/condition  Outcome: Progressing  Goal: Absence of fever/infection during neutropenic period  Description: INTERVENTIONS:  - Monitor WBC    Outcome: Progressing     Problem: SAFETY ADULT  Goal: Patient will remain free of falls  Description: INTERVENTIONS:  - Educate patient/family on patient safety including physical limitations  - Instruct patient to call for assistance with activity   - Consult OT/PT to assist with strengthening/mobility   - Keep Call bell within reach  - Keep bed low and locked with side rails adjusted as appropriate  - Keep care items and personal belongings within reach  - Initiate and maintain comfort rounds  - Make Fall Risk Sign visible to staff  - Offer Toileting every 2 Hours, in advance of need  - Initiate/Maintain bed alarm  - Obtain necessary fall risk management equipment: yes  - Apply yellow socks and bracelet for high fall risk patients  - Consider moving patient to room near nurses station  Outcome: Progressing  Goal: Maintain or return to baseline ADL function  Description: INTERVENTIONS:  -  Assess patient's ability to carry out ADLs; assess patient's baseline for ADL function and identify physical deficits which impact ability to perform ADLs (bathing, care of mouth/teeth, toileting, grooming, dressing, etc.)  - Assess/evaluate cause of self-care deficits   - Assess range of motion  - Assess patient's mobility; develop plan if impaired  - Assess patient's need for assistive devices and provide as appropriate  - Encourage maximum independence but intervene and supervise when necessary  - Involve family in performance of ADLs  - Assess for home care needs following discharge   - Consider OT consult to assist with ADL evaluation and planning for discharge  - Provide patient education as appropriate  Outcome: Progressing  Goal: Maintains/Returns to pre admission functional level  Description: INTERVENTIONS:  - Perform BMAT or MOVE assessment daily.   - Set and communicate daily mobility goal to care team and patient/family/caregiver. - Collaborate with rehabilitation services on mobility goals if consulted  - Perform Range of Motion 3 times a day. - Reposition patient every 2 hours.   - Dangle patient 3 times a day  - Stand patient 3 times a day  - Ambulate patient 3 times a day  - Out of bed to chair 3 times a day   - Out of bed for meals 3 times a day  - Out of bed for toileting  - Record patient progress and toleration of activity level   Outcome: Progressing

## 2023-10-23 NOTE — ASSESSMENT & PLAN NOTE
Patient noted to be choking and coughing on food and medications on 10/22. Speech therapist consult.   VBS done   Recommended puréed diet with honey thick liquid

## 2023-10-23 NOTE — PLAN OF CARE
Problem: PAIN - ADULT  Goal: Verbalizes/displays adequate comfort level or baseline comfort level  Description: Interventions:  - Encourage patient to monitor pain and request assistance  - Assess pain using appropriate pain scale  - Administer analgesics based on type and severity of pain and evaluate response  - Implement non-pharmacological measures as appropriate and evaluate response  - Consider cultural and social influences on pain and pain management  - Notify physician/advanced practitioner if interventions unsuccessful or patient reports new pain  Outcome: Progressing     Problem: INFECTION - ADULT  Goal: Absence or prevention of progression during hospitalization  Description: INTERVENTIONS:  - Assess and monitor for signs and symptoms of infection  - Monitor lab/diagnostic results  - Monitor all insertion sites, i.e. indwelling lines, tubes, and drains  - Monitor endotracheal if appropriate and nasal secretions for changes in amount and color  - [place] appropriate cooling/warming therapies per order  - Administer medications as ordered  - Instruct and encourage patient and family to use good hand hygiene technique  - Identify and instruct in appropriate isolation precautions for identified infection/condition  Outcome: Progressing  Goal: Absence of fever/infection during neutropenic period  Description: INTERVENTIONS:  - Monitor WBC    Outcome: Progressing     Problem: SAFETY ADULT  Goal: Patient will remain free of falls  Description: INTERVENTIONS:  - Educate patient/family on patient safety including physical limitations  - Instruct patient to call for assistance with activity   - Consult OT/PT to assist with strengthening/mobility   - Keep Call bell within reach  - Keep bed low and locked with side rails adjusted as appropriate  - Keep care items and personal belongings within reach  - Initiate and maintain comfort rounds  - Make Fall Risk Sign visible to staff  - Offer Toileting every 2 Hours, in advance of need  - Initiate/Maintain bed alarm  - Obtain necessary fall risk management equipment: yes  - Apply yellow socks and bracelet for high fall risk patients  - Consider moving patient to room near nurses station  Outcome: Progressing  Goal: Maintain or return to baseline ADL function  Description: INTERVENTIONS:  -  Assess patient's ability to carry out ADLs; assess patient's baseline for ADL function and identify physical deficits which impact ability to perform ADLs (bathing, care of mouth/teeth, toileting, grooming, dressing, etc.)  - Assess/evaluate cause of self-care deficits   - Assess range of motion  - Assess patient's mobility; develop plan if impaired  - Assess patient's need for assistive devices and provide as appropriate  - Encourage maximum independence but intervene and supervise when necessary  - Involve family in performance of ADLs  - Assess for home care needs following discharge   - Consider OT consult to assist with ADL evaluation and planning for discharge  - Provide patient education as appropriate  Outcome: Progressing  Goal: Maintains/Returns to pre admission functional level  Description: INTERVENTIONS:  - Perform BMAT or MOVE assessment daily.   - Set and communicate daily mobility goal to care team and patient/family/caregiver. - Collaborate with rehabilitation services on mobility goals if consulted  - Perform Range of Motion 3 times a day. - Reposition patient every 2 hours.   - Dangle patient 3 times a day  - Stand patient 3 times a day  - Ambulate patient 3 times a day  - Out of bed to chair 3 times a day   - Out of bed for meals 3 times a day  - Out of bed for toileting  - Record patient progress and toleration of activity level   Outcome: Progressing     Problem: DISCHARGE PLANNING  Goal: Discharge to home or other facility with appropriate resources  Description: INTERVENTIONS:  - Identify barriers to discharge w/patient and caregiver  - Arrange for needed discharge resources and transportation as appropriate  - Identify discharge learning needs (meds, wound care, etc.)  - Arrange for interpretive services to assist at discharge as needed  - Refer to Case Management Department for coordinating discharge planning if the patient needs post-hospital services based on physician/advanced practitioner order or complex needs related to functional status, cognitive ability, or social support system  Outcome: Progressing     Problem: Knowledge Deficit  Goal: Patient/family/caregiver demonstrates understanding of disease process, treatment plan, medications, and discharge instructions  Description: Complete learning assessment and assess knowledge base.   Interventions:  - Provide teaching at level of understanding  - Provide teaching via preferred learning methods  Outcome: Progressing     Problem: CARDIOVASCULAR - ADULT  Goal: Maintains optimal cardiac output and hemodynamic stability  Description: INTERVENTIONS:  - Monitor I/O, vital signs and rhythm  - Monitor for S/S and trends of decreased cardiac output  - Administer and titrate ordered vasoactive medications to optimize hemodynamic stability  - Assess quality of pulses, skin color and temperature  - Assess for signs of decreased coronary artery perfusion  - Instruct patient to report change in severity of symptoms  Outcome: Progressing  Goal: Absence of cardiac dysrhythmias or at baseline rhythm  Description: INTERVENTIONS:  - Continuous cardiac monitoring, vital signs, obtain 12 lead EKG if ordered  - Administer antiarrhythmic and heart rate control medications as ordered  - Monitor electrolytes and administer replacement therapy as ordered  Outcome: Progressing     Problem: RESPIRATORY - ADULT  Goal: Achieves optimal ventilation and oxygenation  Description: INTERVENTIONS:  - Assess for changes in respiratory status  - Assess for changes in mentation and behavior  - Position to facilitate oxygenation and minimize respiratory effort  - Oxygen administered by appropriate delivery if ordered  - Initiate smoking cessation education as indicated  - Encourage broncho-pulmonary hygiene including cough, deep breathe, Incentive Spirometry  - Assess the need for suctioning and aspirate as needed  - Assess and instruct to report SOB or any respiratory difficulty  - Respiratory Therapy support as indicated  Outcome: Progressing     Problem: GASTROINTESTINAL - ADULT  Goal: Maintains adequate nutritional intake  Description: INTERVENTIONS:  - Monitor percentage of each meal consumed  - Identify factors contributing to decreased intake, treat as appropriate  - Assist with meals as needed  - Monitor I&O, weight, and lab values if indicated  - Obtain nutrition services referral as needed  Outcome: Progressing  Goal: Oral mucous membranes remain intact  Description: INTERVENTIONS  - Assess oral mucosa and hygiene practices  - Implement preventative oral hygiene regimen  - Implement oral medicated treatments as ordered  - Initiate Nutrition services referral as needed  Outcome: Progressing     Problem: GENITOURINARY - ADULT  Goal: Urinary catheter remains patent  Description: INTERVENTIONS:  - Assess patency of urinary catheter  - If patient has a chronic márquez, consider changing catheter if non-functioning  - Follow guidelines for intermittent irrigation of non-functioning urinary catheter  Outcome: Progressing     Problem: METABOLIC, FLUID AND ELECTROLYTES - ADULT  Goal: Electrolytes maintained within normal limits  Description: INTERVENTIONS:  - Monitor labs and assess patient for signs and symptoms of electrolyte imbalances  - Administer electrolyte replacement as ordered  - Monitor response to electrolyte replacements, including repeat lab results as appropriate  - Instruct patient on fluid and nutrition as appropriate  Outcome: Progressing  Goal: Fluid balance maintained  Description: INTERVENTIONS:  - Monitor labs   - Monitor I/O and WT  - Instruct patient on fluid and nutrition as appropriate  - Assess for signs & symptoms of volume excess or deficit  Outcome: Progressing     Problem: SKIN/TISSUE INTEGRITY - ADULT  Goal: Skin Integrity remains intact(Skin Breakdown Prevention)  Description: Assess:  -Perform Jose assessment every shift  -Clean and moisturize skin every shift  -Inspect skin when repositioning, toileting, and assisting with ADLS  -Assess under medical devices such as oxygen every shift  -Assess extremities for adequate circulation and sensation     Bed Management:  -Have minimal linens on bed & keep smooth, unwrinkled  -Change linens as needed when moist or perspiring  -Avoid sitting or lying in one position for more than 2 hours while in bed  -Keep HOB at 30 degrees     Toileting:  -Offer bedside commode  -Assess for incontinence every shift  -Use incontinent care products after each incontinent episode such as barrier cream    Activity:  -Mobilize patient 3 times a day  -Encourage activity and walks on unit  -Encourage or provide ROM exercises   -Turn and reposition patient every 2 Hours  -Use appropriate equipment to lift or move patient in bed  -Instruct/ Assist with weight shifting every 30 minutes when out of bed in chair  -Consider limitation of chair time 2 hour intervals    Skin Care:  -Avoid use of baby powder, tape, friction and shearing, hot water or constrictive clothing  -Relieve pressure over bony prominences using foam wedges  -Do not massage red bony areas    Next Steps:  -Teach patient strategies to minimize risks such as weight shifting   -Consider consults to  interdisciplinary teams such as OT/PT  Outcome: Progressing     Problem: MOBILITY - ADULT  Goal: Maintain or return to baseline ADL function  Description: INTERVENTIONS:  -  Assess patient's ability to carry out ADLs; assess patient's baseline for ADL function and identify physical deficits which impact ability to perform ADLs (bathing, care of mouth/teeth, toileting, grooming, dressing, etc.)  - Assess/evaluate cause of self-care deficits   - Assess range of motion  - Assess patient's mobility; develop plan if impaired  - Assess patient's need for assistive devices and provide as appropriate  - Encourage maximum independence but intervene and supervise when necessary  - Involve family in performance of ADLs  - Assess for home care needs following discharge   - Consider OT consult to assist with ADL evaluation and planning for discharge  - Provide patient education as appropriate  Outcome: Progressing  Goal: Maintains/Returns to pre admission functional level  Description: INTERVENTIONS:  - Perform BMAT or MOVE assessment daily.   - Set and communicate daily mobility goal to care team and patient/family/caregiver. - Collaborate with rehabilitation services on mobility goals if consulted  - Perform Range of Motion 3 times a day. - Reposition patient every 2 hours.   - Dangle patient 3 times a day  - Stand patient 3 times a day  - Ambulate patient 3 times a day  - Out of bed to chair 3 times a day   - Out of bed for meals 3 times a day  - Out of bed for toileting  - Record patient progress and toleration of activity level   Outcome: Progressing     Problem: Prexisting or High Potential for Compromised Skin Integrity  Goal: Skin integrity is maintained or improved  Description: INTERVENTIONS:  - Identify patients at risk for skin breakdown  - Assess and monitor skin integrity  - Assess and monitor nutrition and hydration status  - Monitor labs   - Assess for incontinence   - Turn and reposition patient  - Assist with mobility/ambulation  - Relieve pressure over bony prominences  - Avoid friction and shearing  - Provide appropriate hygiene as needed including keeping skin clean and dry  - Evaluate need for skin moisturizer/barrier cream  - Collaborate with interdisciplinary team   - Patient/family teaching  - Consider wound care consult   Outcome: Progressing Problem: Nutrition/Hydration-ADULT  Goal: Nutrient/Hydration intake appropriate for improving, restoring or maintaining nutritional needs  Description: Monitor and assess patient's nutrition/hydration status for malnutrition. Collaborate with interdisciplinary team and initiate plan and interventions as ordered. Monitor patient's weight and dietary intake as ordered or per policy. Utilize nutrition screening tool and intervene as necessary. Determine patient's food preferences and provide high-protein, high-caloric foods as appropriate.      INTERVENTIONS:  - Monitor oral intake, urinary output, labs, and treatment plans  - Assess nutrition and hydration status and recommend course of action  - Evaluate amount of meals eaten  - Assist patient with eating if necessary   - Allow adequate time for meals  - Recommend/ encourage appropriate diets, oral nutritional supplements, and vitamin/mineral supplements  - Order, calculate, and assess calorie counts as needed  - Recommend, monitor, and adjust tube feedings and TPN/PPN based on assessed needs  - Assess need for intravenous fluids  - Provide specific nutrition/hydration education as appropriate  - Include patient/family/caregiver in decisions related to nutrition  Outcome: Progressing

## 2023-10-23 NOTE — PROCEDURES
Speech Pathology Videofluoroscopic Swallow Study (VFSS/VBSS/MBSS)      Patient Name: Anthony Emery    IPEAX'H Date: 10/23/2023     Problem List  Principal Problem:    Acute respiratory failure with hypoxia [facility])  Active Problems:    Acute encephalopathy    Severe dementia ([address])    Essential hypertension    Sepsis ([address])    Urinary retention    Atrial fibrillation with rapid ventricular response (HCC)    Elevated troponin    CAD (coronary artery disease)    Severe protein-calorie malnutrition (HCC)    Hypernatremia    Dysphagia      Past Medical History  Past Medical History:   Diagnosis Date    Dementia ([address])     Hypertension          General Information; Bedside swallowing evaluation was completed 10/18. Please see report for impressions and recommendations. On 10/22, patient evidenced significant/distressful coughing when ingesting pudding. He was made n.p.o. and VFSS was recommended to further assess oropharyngeal stage swallowing skills and risk this time. Pt was viewed in lateral position and assessed with puree, honey thick liquid, nectar thick liquid and a minimum of thin liquid. Impressions follow    Oral stage: At least moderate oral stage dysphagia. Bolus formation and transfer were moderately weak appearing and incomplete (mild to mod oral residue with puree/HTL, at least mild residue with NTL/thin liquid). Oral control was impaired with premature spillage over the base of tongue just beyond the vallecula with honey thick liquid but to the piriform/filled the pyriforms before the swallow with nectar thick and thin liquid. Pharyngeal stage: Moderate pharyngeal dysphagia. Cervical osteophytes and limited space between the epiglottis and posterior pharyngeal wall noted on the initial view. Velar elevation noted. Swallowing initiation was at least mildly delayed. Laryngeal rise noted but anterior hyoid displacement was reduced.   AIrway entrance closure appeared mildly compromised. Epiglottic inversion was rarely achieved (in general the epiglottis abutted the posterior pharyngeal wall)  Tongue base retraction appeared to be moderately weakened. PES opening was adequate. Management of food/liquid/barium tablet follows:   Puréed food: At least moderate vallecular residue was noted reduced slightly when head was fully supported by pillows to achieve slight neck flexion. Very rare and delayed secondary swallows were note  Administration of teaspoons of honey and nectar thick liquid reduced residue. Honey thick liquid: At least mild vallecular and pyriform residue but penetration or aspiration before/during/after the swallow occurred today with teaspoons and cup sips. Nectar thick liquid: Spillage and mild delay resulted in episodes of trace penetration to the vocal cords. Thin liquid: Spillage and mild delay resulted in trace aspiration x1 with both teaspoon/small sips of thin liquid    Strategies and Efficacy: Mr. Silvina Lacy is unable to consistently apply strategies given his dementia. However full upright positioning and head support to achieve slight neck flexion did result in some reduction in amount of vallecular residue with puréed food    Aspiration Response and Efficacy: No immediate throat clearing or cough noted in response to trace penetration/aspiration    Esophageal stage:  Brief view of esophagus was completed at the end of testing. No gross stasis identified with limited amounts administered this a.m. Assessment Summary:    Mr Silvina Lacy presented with s/s at least mod oral and mod pharyngeal dysphagia as described above. Note: Images are available for review in PACS as desired.     Recommendations:   Recommended Diet:  puree/level 1 diet and honey thick liquids   Recommended Form of Medications: crushed with puree   Aspiration precautions and compensatory swallowing strategies:    Full upright positioning with pillow/s behind head to achieve slight neck flexion. Only feed when fully alert and slow rate of feeding (allow time for 2* transfer/swallow)  OK to alternate puree with thick liquid    SLP Dysphagia therapy recommended to continue at SNF (?prognosis for return to NTL)    Results Reviewed with: patient and RN     Thank you for this referral.  Please do not hesitate to contact me with any questions or concerns.     Sena Ponce [facility]   Speech Pathologist  [place] License [id number]  [place] License [id number]  Available via [facility] Text

## 2023-10-24 VITALS
SYSTOLIC BLOOD PRESSURE: 108 MMHG | DIASTOLIC BLOOD PRESSURE: 64 MMHG | TEMPERATURE: 97.6 F | WEIGHT: 117.73 LBS | BODY MASS INDEX: 18.48 KG/M2 | HEART RATE: 68 BPM | OXYGEN SATURATION: 92 % | RESPIRATION RATE: 20 BRPM | HEIGHT: 67 IN

## 2023-10-24 LAB
ANION GAP SERPL CALCULATED.3IONS-SCNC: 6 MMOL/L
BACTERIA BLD CULT: NORMAL
BACTERIA BLD CULT: NORMAL
BUN SERPL-MCNC: 16 MG/DL (ref 5–25)
CALCIUM SERPL-MCNC: 8.7 MG/DL (ref 8.4–10.2)
CHLORIDE SERPL-SCNC: 111 MMOL/L (ref 96–108)
CO2 SERPL-SCNC: 25 MMOL/L (ref 21–32)
CREAT SERPL-MCNC: 0.63 MG/DL (ref 0.6–1.3)
GFR SERPL CREATININE-BSD FRML MDRD: 91 ML/MIN/1.73SQ M
GLUCOSE SERPL-MCNC: 81 MG/DL (ref 65–140)
MAGNESIUM SERPL-MCNC: 2.1 MG/DL (ref 1.9–2.7)
POTASSIUM SERPL-SCNC: 3.9 MMOL/L (ref 3.5–5.3)
SODIUM SERPL-SCNC: 142 MMOL/L (ref 135–147)

## 2023-10-24 PROCEDURE — 83735 ASSAY OF MAGNESIUM: CPT

## 2023-10-24 PROCEDURE — 80048 BASIC METABOLIC PNL TOTAL CA: CPT

## 2023-10-24 PROCEDURE — 92526 ORAL FUNCTION THERAPY: CPT

## 2023-10-24 PROCEDURE — 99239 HOSP IP/OBS DSCHRG MGMT >30: CPT

## 2023-10-24 RX ORDER — SACCHAROMYCES BOULARDII 250 MG
250 CAPSULE ORAL 2 TIMES DAILY
Refills: 0
Start: 2023-10-24

## 2023-10-24 RX ORDER — LOSARTAN POTASSIUM 50 MG/1
50 TABLET ORAL DAILY
Refills: 0
Start: 2023-10-25

## 2023-10-24 RX ORDER — NIFEDIPINE 30 MG/1
30 TABLET, EXTENDED RELEASE ORAL DAILY
Refills: 0
Start: 2023-10-25

## 2023-10-24 RX ORDER — TAMSULOSIN HYDROCHLORIDE 0.4 MG/1
0.4 CAPSULE ORAL
Refills: 0
Start: 2023-10-24

## 2023-10-24 RX ADMIN — LEVOTHYROXINE SODIUM 50 MCG: 50 TABLET ORAL at 05:32

## 2023-10-24 RX ADMIN — TAMSULOSIN HYDROCHLORIDE 0.4 MG: 0.4 CAPSULE ORAL at 15:47

## 2023-10-24 RX ADMIN — LOSARTAN POTASSIUM 50 MG: 50 TABLET, FILM COATED ORAL at 08:57

## 2023-10-24 RX ADMIN — ASPIRIN 81 MG CHEWABLE TABLET 81 MG: 81 TABLET CHEWABLE at 08:57

## 2023-10-24 RX ADMIN — METOPROLOL TARTRATE 25 MG: 25 TABLET, FILM COATED ORAL at 08:57

## 2023-10-24 RX ADMIN — GLYCERIN, HYPROMELLOSE, POLYETHYLENE GLYCOL 1 DROP: .2; .2; 1 LIQUID OPHTHALMIC at 08:59

## 2023-10-24 RX ADMIN — NIFEDIPINE 30 MG: 30 TABLET, EXTENDED RELEASE ORAL at 08:57

## 2023-10-24 RX ADMIN — FAMOTIDINE 20 MG: 10 INJECTION, SOLUTION INTRAVENOUS at 08:58

## 2023-10-24 RX ADMIN — SENNOSIDES 8.6 MG: 8.6 TABLET, FILM COATED ORAL at 08:57

## 2023-10-24 RX ADMIN — Medication 250 MG: at 08:57

## 2023-10-24 RX ADMIN — APIXABAN 2.5 MG: 2.5 TABLET, FILM COATED ORAL at 08:57

## 2023-10-24 RX ADMIN — CEFTRIAXONE 1000 MG: 1 INJECTION, SOLUTION INTRAVENOUS at 13:03

## 2023-10-24 NOTE — ASSESSMENT & PLAN NOTE
Status post angioplasty in 1999. On baby aspirin  and labetalol in nursing home. No longer taking statin.   Labetalol discontinued and changed to Metoprolol 25 mg BID   LDL 72

## 2023-10-24 NOTE — CASE MANAGEMENT
Case Management Discharge Planning Note    Patient name Thor Trinh  Location [address] [facility] 12-* MRN [id number]  : 1940 Date 10/24/2023       Current Admission Date: 10/18/2023  Current Admission Diagnosis:Acute respiratory failure with hypoxia [facility])   Patient Active Problem List    Diagnosis Date Noted    Dysphagia 10/22/2023    Hypernatremia 10/21/2023    CAD (coronary artery disease) 10/19/2023    Severe protein-calorie malnutrition ([address]) 10/19/2023    Sepsis ([address]) 10/18/2023    Urinary retention 10/18/2023    Atrial fibrillation with rapid ventricular response ([address]) 10/18/2023    Elevated troponin 10/18/2023    Acute respiratory failure with hypoxia ([address]) 2023    Acute encephalopathy 2023    Severe dementia ([address]) 2023    Essential hypertension 2023    Hilar mass 2023    Goals of care, counseling/discussion 2023      LOS (days): 6  Geometric Mean LOS (GMLOS) (days): 5.10  Days to GMLOS:-0.5     OBJECTIVE:  Risk of Unplanned Readmission Score: 18.95       Current admission status: Inpatient   Preferred Pharmacy:   PATIENT/FAMILY REPORTS NO PREFERRED PHARMACY  No address on file      Primary Care Provider: Christian Morgan DO    Primary Insurance: MEDICARE  Secondary Insurance: [address]    DISCHARGE DETAILS:     Contacts  Patient Contacts: Chris Carpenter (daughter)  Relationship to Patient[de-identified] Family  Contact Method: Phone  Phone Number: [phone]  Reason/Outcome: Emergency Contact, Discharge  [facility] [address]         Is the patient interested in [address] at discharge?: No    DME Referral Provided  Referral made for DME?: No    Other Referral/Resources/Interventions Provided:  Interventions: Facility Return, SNF, Transportation    Would you like to participate in our [address] [facility] [address] service program?  : No - Declined    Treatment Team Recommendation: Facility Return, SNF  Discharge Destination Plan[de-identified] Facility Return, SNF  Transport at Discharge : [facility]  Dispatcher Contacted: Yes  Number/Name of Dispatcher: [facility] via [employer]  Transported by [employer] and Unit #): [facility]  ETA of Transport (Date): 10/24/23  ETA of Transport (Time): 1500         IMM Given (Date):: 10/24/23 (IMM reviewed by phone with daughter Fede Clark and she verbalized acknowledgement. Fede Clark is in agreement with discharge determination.   Fede Clark declined copy and copy placed in scan bin for chart.)  IMM Given to[de-identified] [address], [facility] : [facility]  Receiving Facility/Agency Phone Number: [phone]  Facility/Agency Fax Number: [phone]

## 2023-10-24 NOTE — DISCHARGE SUMMARY
[id number] [facility]  Discharge- Eliazar Abu 1940, 81 y.o. male MRN: [id number]  Unit/Bed#: [address] [place] [address] Encounter: [id number]  Primary Care Provider: Steven Granda DO   Date and time admitted to hospital: 10/18/2023  8:41 PM    Dysphagia  Assessment & Plan  Patient noted to be choking and coughing on food and medications on 10/22. Speech therapist consult. VBS done   Recommended puréed diet with honey thick liquid    Hypernatremia  Assessment & Plan  Na 151; had been trending up   Repeat BMP down to 147 > 139 > 142  Repeat in am    CAD (coronary artery disease)  Assessment & Plan  Status post angioplasty in 1999. On baby aspirin  and labetalol in nursing home. No longer taking statin. Labetalol discontinued and changed to Metoprolol 25 mg BID   LDL 72    Elevated troponin  Assessment & Plan  Troponin 450->385  Initial EKG showed sinus tach with premature supraventricular complexes, rate 122, ST depression in lateral leads. Repeat EKG showed A-fib with RVR, rate 140  Likely type II MI secondary to sepsis and  tachycardia  Trend troponin  Echo with preserved EF, no wall motion abnormalities   Patient was started on heparin drip in ED, transitioned to oral Eliquis   Consult cardiology-input appreciated  Continue medical management    Atrial fibrillation with rapid ventricular response ([address])  Assessment & Plan  In the setting of sepsis. Episodes of A-fib with RVR during hospitalization in June to July 2023 in the setting of severe sepsis per chart review. History of PACs per chart review. Rate controlled currently  Currently on metoprolol  Echo - EF of 60% with no wall motion abnormality   Optimize electrolytes  Had been on heparin drip   Cardiology following, input appreciated  Discussed with family regarding risk and benefits of long-term anticoagulation. No history of bleeding in the past.  History of fall previously but currently patient is bedbound.   Changed to Eliquis for stroke prevention     Urinary retention  Assessment & Plan  Appears to be acute. Similar history in the past  Márquez inserted in ED, initial urine output 1000 mL. Bladder scan showed 800 mL prior to insertion. Continue Márquez catheter  Patient is on DuoNeb 3 times daily and every 6 hours as needed in nursing home. Will change to Xopenex as needed. Urology consultation appreciated - márquez draining to bedside bag  Discharge with Márquez until mentation improves for voiding trial per urology    Sepsis [facility])  Assessment & Plan  POA, as evidenced by fever, tachycardia, with suspected source of infection right lower lobe pneumonia, likely aspiration in nature. Remains afebrile, hemodynamically stable. Lactic acid normal  Procalcitonin 5.54 initially, down trended to 0.21  UA shows moderate bacteria, no white count, no nitrite, no leukocytes. Urine culture no growth till date  10/18: Chest x-ray with evidence of pneumonia, left base  IV Zosyn transitioned to ceftriaxone on 10/22  Blood cultures negative to date  10/23: Repeat chest x-ray,No acute cardiopulmonary disease. Essential hypertension  Assessment & Plan  On lisinopril, labetalol in nursing home. BP was in 200/100s last week per staff. Currently blood pressure stable  Hold lisinopril  Labetalol was changed to metoprolol  Added Procardia 30 mg XL daily  Monitor BP closely    Severe dementia [facility])  Assessment & Plan  Nursing home resident. On puréed, nectar thick liquids in nursing home. Essentially wheelchair-bound. Incontinent of bowel and bladder. Patient talks minimally at baseline, Malawian-speaking only. Continue Aricept  Supportive care  Overall guarded prognosis, discussed with daughter over the phone. Advanced directives verified, patient is DNR/DNI    Acute encephalopathy  Assessment & Plan  Most likely toxic metabolic encephalopathy due to sepsis.   CT head without any acute overload, evidence of prior CVA noted  Treat underlying cause  Patient intermittently lethargic with periods of alertness   Monitor mental status    * Acute respiratory failure with hypoxia [facility])  Assessment & Plan  Patient presents with hypoxia, SPO2 in 80s on room air since yesterday in nursing home, was placed on O2 5 L in nursing home to get SPO2 above 90%. Per staff, patient has not been eating or drinking much for at least 2 days, having cough with nectar thick liquids, having audible wheezing, not responding as he normally does for at least 2 days. At baseline patient is able to talk a few words, able to feed self, able to follow simple directions and able to do puzzle and stack blocks per staff. Patient has not been able to do all of the above for about one week. Had a chest x-ray yesterday in nursing home which was unremarkable. Patient was given Levaquin 500mg p.o. prior to coming to ED. No fever in nursing home per staff. Chart reviewed. Patient was hospitalized between 6/30-7/4/2023 for severe sepsis secondary to left-sided pneumonia, acute hypoxic respiratory failure, UTI. Patient was treated with cefepime and cefdinir for 7 days total.  Patient was found to have hilar mass on CT without contrast.  Radiology recommended CT chest with contrast, however daughter declined the test as she would not further pursue any treatment if the mass was concerning. Chest x-ray evidence of increased opacity in the left base  Fever 101.4 in ED, patient was in A-fib with RVR intermittently in ED, meets sepsis criteria. Management as below. Procalcitonin 5.54. Now afebrile, remains hemodynamically stable.   Now saturating mid 90s on room air at rest.  Likely secondary to pneumonia and sepsis with encephalopathy  Patient given Rocephin Zithromax in ED. patient was on Zosyn, de-escalated to ceftriaxone, monitor procalcitonin on ceftriaxone  Speech eval-recommended purée with honey thick liquid, aspiration precaution-10/22 patient noted to be choking on food, discussed with speech therapist, make n.p.o. and will do video barium swallow in a.m. Gentle IV hydration  Xopenex as needed, chest PT as tolerated  Continue supplemental oxygen to maintain sats above 90%. Medical Problems       Resolved Problems  Date Reviewed: 10/24/2023   None       Discharging Physician / Practitioner: NAHUN Sanz  PCP: Micaela Barrera DO  Admission Date:   Admission Orders (From admission, onward)       Ordered        10/18/23 2322  INPATIENT ADMISSION  Once                          Discharge Date: 10/24/23    Consultations During Hospital Stay:  Cardiology  Urology    Procedures Performed:   Chest x-ray: Slightly increased opacity in the left base, question recurrent pneumonia postinfectious cough from previous pneumonia  CT head without contrast: No acute intracranial abnormality  Fluoroscopy barium swallow video with speech    Significant Findings / Test Results:   None    Incidental Findings:   None      Test Results Pending at Discharge (will require follow up): None     Outpatient Tests Requested:  None    Complications: None    Reason for Admission: Hypoxia, lethargic    Hospital Course:   Roger Zambrano is a 81 y.o. male patient who originally presented to the hospital on 10/18/2023 due to above. Chest x-ray with results indicated above. Admitted with fever of 101.4 in ED and A-fib with RVR, for which he received Cardizem 15 mg IV x1. Procalcitonin 5.54, lactic acid normal.  Received ceftriaxone and azithromycin in ED was changed to Zosyn. Troponin was elevated at 450> 385. Heparin drip was initiated in ED and cardiology was consulted. Anticoagulant was changed to Eliquis 2.5 mg twice daily. TTE shows a normal ejection fraction without major wall motion abnormalities  Urology was consulted for urinary retention requiring Calderon catheter insertion in the ED which yielded 1 L of fluid.   Voiding trial will be done outpatient when patient mentation improves. Speech therapy recommended puréed diet with honey thick liquid. Hyponatremia with sodium of 151 noted. Patient received D5 and sodium down trended to 142 prior to discharge. Cardiology discontinue labetalol and started patient on metoprolol with addition of Procardia 30 milligrams XL daily. Patient is being discharged to [facility] care in [place] in stable condition to follow-up with urology outpatient for voiding trial.    Please see above list of diagnoses and related plan for additional information. Condition at Discharge: stable    Discharge Day Visit / Exam:     Subjective:    Patient seen and examined at bedside sleeping but easily arousable. At baseline, with no sign of distress noted. Vitals: Blood Pressure: 108/64 (10/24/23 1500)  Pulse: 68 (10/24/23 1500)  Temperature: 97.6 °F (36.4 °C) (10/24/23 1500)  Temp Source: Axillary (10/24/23 1500)  Respirations: 20 (10/24/23 1500)  Height: 5' 7" (170.2 cm) (10/19/23 0729)  Weight - Scale: 53.4 kg (117 lb 11.6 oz) (10/19/23 0729)  SpO2: 92 % (10/24/23 1500)      Exam:   Physical Exam  Constitutional:       Appearance: He is cachectic. He is ill-appearing. HENT:      Head: Normocephalic. Mouth/Throat:      Mouth: Mucous membranes are dry. Eyes:      General: No scleral icterus. Cardiovascular:      Rate and Rhythm: Normal rate. Pulmonary:      Effort: Pulmonary effort is normal.      Breath sounds: Decreased air movement present. Abdominal:      General: Bowel sounds are normal.      Tenderness: There is no abdominal tenderness. Musculoskeletal:      Right lower leg: No edema. Left lower leg: No edema. Skin:     Capillary Refill: Capillary refill takes 2 to 3 seconds. Coloration: Skin is pale. Neurological:      Mental Status: He is easily aroused. Mental status is at baseline. Motor: Weakness present.    Psychiatric:         Behavior: Behavior normal.         Discussion with Family: Updated contact person (daughter) via phone. Discharge instructions/Information to patient and family:   See after visit summary for information provided to patient and family. Provisions for Follow-Up Care:  See after visit summary for information related to follow-up care and any pertinent home health orders. Disposition:   [facility] / [address] [place] [address] at [facility] in [place]    Planned Readmission: No     Discharge Statement:  I spent 35 minutes discharging the patient. This time was spent on the day of discharge. I had direct contact with the patient on the day of discharge. Greater than 50% of the total time was spent examining patient, answering all patient questions, arranging and discussing plan of care with patient as well as directly providing post-discharge instructions. Additional time then spent on discharge activities. Discharge Medications:  See after visit summary for reconciled discharge medications provided to patient and/or family.       **Please Note: This note may have been constructed using a voice recognition system**

## 2023-10-24 NOTE — PLAN OF CARE
VBS completed:  mod oropharyngeal dysphagia. Recommendations:   Recommended Diet:  puree/level 1 diet and honey thick liquids   Recommended Form of Medications: crushed with puree   Aspiration precautions and compensatory swallowing strategies:    Full upright positioning with pillow/s behind head to achieve slight neck flexion.     Only feed when fully alert and slow rate of feeding (allow time for 2* transfer/swallow)  OK to alternate puree with thick liquid

## 2023-10-24 NOTE — [place]IVERSAL TRANSF[place] FORM
[place] UNIV[place] FOR[place]  (ALL ITEMS MUST BE COMPLETED)    1. TRANSFER FROM: [address]      TRANSFER TO: [facility]    2. DATE OF TRANSFER: 10/24/2023                        TIME OF TRANSFER: 1500    3. PATIENT NAME: Diana Mckinney,        YOB: 1940                             GENDER: male    4. LANGUAGE:   Swedish    5. PHYSICIAN NAME:  Symone Goldstein MD                   PHONE: [phone]. CODE STATUS: Level 3 - DNAR and DNI        Out of Hospital DNR Attached: Yes    7. :                                      :  Extended Emergency Contact Information  Primary Emergency Contact: Mari Scanlon  Address: [facility] [place], [address] [facility] [place] [address] [place] Phone: [phone]  Mobile Phone: [phone]  Relation: Daughter  Secondary Emergency Contact: ALFREDO SCANLON  Mobile Phone: [phone]  Relation: [address] Representative/Proxy:  No           Legal Guardian:  No             NAME OF:           HEALTH CARE REPRESENTATIVE/PROXY:                                         OR           LEGAL GUARDIAN, IF NOT :                                               PHONE:  (Day)           (Night)                        (Cell)    8. REASON FOR TRANSFER: (Must include brief medical history and recent changes in physical function or cognition.) severe demenita            V/S: /60   Pulse 66   Temp 98.2 °F (36.8 °C) (Axillary)   Resp 18   Ht 5' 7" (1.702 m)   Wt 53.4 kg (117 lb 11.6 oz)   SpO2 (!) 89%   BMI 18.44 kg/m²           PAIN: None    9. PRIMARY DIAGNOSIS: Acute respiratory failure with hypoxia (HCC)      Secondary Diagnosis:         Pacemaker: No      Internal Defib: No          Mental Health Diagnosis (if Applicable):    10. RESTRAINTS: No     11. RESPIRATORY NEEDS: None    12. ISOLATION/PRECAUTION: None    13.  ALLERGY: Shellfish-derived products - food allergy    14. SENSORY:       Vision Poor    15. SKIN CONDITION: Yes:  Pressure    16. DIET: Thickened Liquids    17. IV ACCESS: None    18. PERSONAL ITEMS SENT WITH PATIENT: Walker    23. ATTACHED DOCUMENTS: MUST ATTACH CURRENT MEDICATION INFORMATION Face Sheet    20. AT RISK ALERTS:Falls        HARM TO: N/A    21. WEIGHT BEARING STATUS:         Left Leg Bed bound    22. MENTAL STATUS:Disoriented    23. FUNCTION:        Walk: With Help        Transfer: With Help        Toilet: With Help        Feed: With Help    24. IMMUNIZATIONS/SCREENING:     Immunization History   Administered Date(s) Administered    Tdap 04/19/2023       25. BOWEL: Incontinent     26. BLADDER: Incontinent    27.  SENDING FACILITY CONTACT: [facility]                  Title: RUDOLPH Vance         Phone: [phone] (if known):        Title:        Unit:         Phone:         FORM PREFILLED BY (if applicable)       Title:       Unit:        Phone:         FORM COMPLETED BY Fabrizio Odonnell RN      Title: Rn        Phone: [phone]

## 2023-10-24 NOTE — [place]IVERS[place] TRANSF[place] FORM
[place] UNIVERS[place] TRANSFER FORM  (ALL ITEMS MUST BE COMPLETED)    1. TRANSFER FROM: [address]      TRANSFER TO: [facility]    2. DATE OF TRANSFER: 10/24/2023                        TIME OF TRANSFER: 1500    3. PATIENT NAME: Esteban Miller,        YOB: 1940                             GENDER: male    4. LANGUAGE:   English    5. PHYSICIAN NAME:  Anastasia Carranza MD                   PHONE: [phone]. CODE STATUS: Level 3 - DNAR and DNI        Out of Hospital DNR Attached: No    7. :                                      :  Extended Emergency Contact Information  Primary Emergency Contact: Mari Scanlon  Address: [facility] [place], [address] [facility] of [address] [employer] [place] Phone: [phone]  Mobile Phone: [phone]  Relation: Daughter  Secondary Emergency Contact: ALFREDO SCANLON  Mobile Phone: [phone]  Relation: [address] Representative/Proxy:  No           Legal Guardian:  No             NAME OF:           HEALTH CARE REPRESENTATIVE/PROXY:                                         OR           LEGAL GUARDIAN, IF NOT :                                               PHONE:  (Day)           (Night)                        (Cell)    8. REASON FOR TRANSFER: (Must include brief medical history and recent changes in physical function or cognition.) inability to care for self dementia            V/S: /60   Pulse 66   Temp 98.2 °F (36.8 °C) (Axillary)   Resp 18   Ht 5' 7" (1.702 m)   Wt 53.4 kg (117 lb 11.6 oz)   SpO2 (!) 89%   BMI 18.44 kg/m²           PAIN: Yes, Rating 0    9. PRIMARY DIAGNOSIS: Acute respiratory failure with hypoxia (HCC)      Secondary Diagnosis:         Pacemaker: No      Internal Defib: No          Mental Health Diagnosis (if Applicable):    10. RESTRAINTS: No     11. RESPIRATORY NEEDS: None    12. ISOLATION/PRECAUTION: None    13.  ALLERGY: Shellfish-derived products - food allergy    14. SENSORY:       Vision Good    15. SKIN CONDITION: Yes:  Stage (Pressure) I    16. DIET: Thickened Liquids    17. IV ACCESS: None    18. PERSONAL ITEMS SENT WITH PATIENT: None    19. ATTACHED DOCUMENTS: MUST ATTACH CURRENT MEDICATION INFORMATION Face Sheet    20. AT RISK ALERTS:Falls        HARM TO: N/A    21. WEIGHT BEARING STATUS:         Left Leg: Full        Right Leg: Full    22. MENTAL STATUS:Disoriented    23. FUNCTION:        Walk: With Help        Transfer: With Help        Toilet: With Help        Feed: With Help    24. IMMUNIZATIONS/SCREENING:     Immunization History   Administered Date(s) Administered    Tdap 04/19/2023       25. BOWEL: Incontinent     26. BLADDER: Incontinent    27.  [facility]                   [facility]         Unit: [address] [facility]         Phone: [phone] (if known):        Title:        Unit:         Phone:         FORM PREFILLED BY (if applicable)       Title:       Unit:        Phone:         FORM COMPLETED BY Parviz Gama RN      Title: RN        Phone: [phone]

## 2023-10-24 NOTE — SPEECH THERAPY NOTE
Speech/Language Pathology Progress Note    Patient Name: Cathy Valerio  RUNYP'T Date: 10/24/2023     Problem List  Principal Problem:    Acute respiratory failure with hypoxia [facility])  Active Problems:    Acute encephalopathy    Severe dementia ([address])    Essential hypertension    Sepsis ([address])    Urinary retention    Atrial fibrillation with rapid ventricular response (HCC)    Elevated troponin    CAD (coronary artery disease)    Severe protein-calorie malnutrition (HCC)    Hypernatremia    Dysphagia     Subjective:  "Sleepy" per CNA (and I am in agreement). Objective:  Dysphagia therapy continued bedside. Pt was upright on arrival (pillow and rolled towel behind hed, L side supported with wedge). RN reported that pt took meds crushed with no overt s/s aspiration earlier this am.     CNA had begun feeding pt then discontinued 2* report of insufficient sustained alertness (pt "too sleepy"). I full y agree with this judgement (pt now with min response to verbal stimulation and to placement of tsp to lips). I educated CNAs in results of VBS/MBS with focus on educating new CNA Shyanne Rosenthal (delayed swallow, osteophytes) and recommendations (puree/HTL, full upright position with head supported and feed when alert). Precautions posted. Plan/Recommendations:  Continue pt/staff education and support as indicated. Continue pureed food, honey thick liquid. Feed only when alert & upright with head supported.      Srinivas Martinez [facility] [id number]

## 2023-10-24 NOTE — ASSESSMENT & PLAN NOTE
On lisinopril, labetalol in nursing home. BP was in 200/100s last week per staff.   Currently blood pressure stable  Hold lisinopril  Labetalol was changed to metoprolol  Added Procardia 30 mg XL daily  Monitor BP closely

## 2023-10-24 NOTE — ASSESSMENT & PLAN NOTE
Appears to be acute. Similar history in the past  Márquez inserted in ED, initial urine output 1000 mL. Bladder scan showed 800 mL prior to insertion. Continue Márquez catheter  Patient is on DuoNeb 3 times daily and every 6 hours as needed in nursing home. Will change to Xopenex as needed.   Urology consultation appreciated - márquez draining to bedside bag  Discharge with Márquez until mentation improves for voiding trial per urology

## 2023-10-24 NOTE — ASSESSMENT & PLAN NOTE
POA, as evidenced by fever, tachycardia, with suspected source of infection right lower lobe pneumonia, likely aspiration in nature. Remains afebrile, hemodynamically stable. Lactic acid normal  Procalcitonin 5.54 initially, down trended to 0.21  UA shows moderate bacteria, no white count, no nitrite, no leukocytes. Urine culture no growth till date  10/18: Chest x-ray with evidence of pneumonia, left base  IV Zosyn transitioned to ceftriaxone on 10/22    Blood cultures negative to date  10/23: Repeat chest x-ray, No acute cardiopulmonary disease.

## 2023-10-24 NOTE — PLAN OF CARE
Problem: PAIN - ADULT  Goal: Verbalizes/displays adequate comfort level or baseline comfort level  Description: Interventions:  - Encourage patient to monitor pain and request assistance  - Assess pain using appropriate pain scale  - Administer analgesics based on type and severity of pain and evaluate response  - Implement non-pharmacological measures as appropriate and evaluate response  - Consider cultural and social influences on pain and pain management  - Notify physician/advanced practitioner if interventions unsuccessful or patient reports new pain  Outcome: Progressing     Problem: RESPIRATORY - ADULT  Goal: Achieves optimal ventilation and oxygenation  Description: INTERVENTIONS:  - Assess for changes in respiratory status  - Assess for changes in mentation and behavior  - Position to facilitate oxygenation and minimize respiratory effort  - Oxygen administered by appropriate delivery if ordered  - Initiate smoking cessation education as indicated  - Encourage broncho-pulmonary hygiene including cough, deep breathe, Incentive Spirometry  - Assess the need for suctioning and aspirate as needed  - Assess and instruct to report SOB or any respiratory difficulty  - Respiratory Therapy support as indicated  Outcome: Progressing     Problem: CARDIOVASCULAR - ADULT  Goal: Maintains optimal cardiac output and hemodynamic stability  Description: INTERVENTIONS:  - Monitor I/O, vital signs and rhythm  - Monitor for S/S and trends of decreased cardiac output  - Administer and titrate ordered vasoactive medications to optimize hemodynamic stability  - Assess quality of pulses, skin color and temperature  - Assess for signs of decreased coronary artery perfusion  - Instruct patient to report change in severity of symptoms  Outcome: Progressing

## 2023-10-24 NOTE — DISCHARGE INSTR - AVS FIRST PAGE
Follow-up with Dr. Hang Merino (Urology) on 10/30/2023 at 12:15 PM for voiding trial  Follow-up with PCP in 1 week

## 2023-10-31 NOTE — ASSESSMENT & PLAN NOTE
Nursing home resident. On puréed, nectar thick liquids in nursing home. Essentially wheelchair-bound. Incontinent of bowel and bladder. Patient talks minimally at baseline, Italian-speaking only. Continue Aricept  Supportive care  Overall guarded prognosis, discussed with daughter over the phone.   Advanced directives verified, patient is DNR/DNI

## 2023-10-31 NOTE — ASSESSMENT & PLAN NOTE
Troponin 450->385  Initial EKG showed sinus tach with premature supraventricular complexes, rate 122, ST depression in lateral leads.   Repeat EKG showed A-fib with RVR, rate 140  Likely nonischemic MI secondary to sepsis and  tachycardia  Trend troponin  Echo with preserved EF, no wall motion abnormalities   Patient was started on heparin drip in ED, transitioned to oral Eliquis   Consult cardiology-input appreciated  Continue medical management

## 2023-12-18 PROBLEM — A41.9 SEPSIS (HCC): Status: RESOLVED | Noted: 2023-10-18 | Resolved: 2023-12-18

## 2023-12-19 ENCOUNTER — OFFICE VISIT (OUTPATIENT)
Dept: GASTROENTEROLOGY | Facility: CLINIC | Age: 83
End: 2023-12-19
Payer: MEDICARE

## 2023-12-19 VITALS — DIASTOLIC BLOOD PRESSURE: 68 MMHG | HEART RATE: 88 BPM | SYSTOLIC BLOOD PRESSURE: 101 MMHG

## 2023-12-19 DIAGNOSIS — E43 SEVERE PROTEIN-CALORIE MALNUTRITION (HCC): Primary | ICD-10-CM

## 2023-12-19 DIAGNOSIS — F03.C0 SEVERE DEMENTIA, UNSPECIFIED DEMENTIA TYPE, UNSPECIFIED WHETHER BEHAVIORAL, PSYCHOTIC, OR MOOD DISTURBANCE OR ANXIETY (HCC): ICD-10-CM

## 2023-12-19 DIAGNOSIS — R13.12 OROPHARYNGEAL DYSPHAGIA: ICD-10-CM

## 2023-12-19 PROCEDURE — 99204 OFFICE O/P NEW MOD 45 MIN: CPT | Performed by: NURSE PRACTITIONER

## 2023-12-19 PROCEDURE — 99214 OFFICE O/P EST MOD 30 MIN: CPT | Performed by: NURSE PRACTITIONER

## 2023-12-19 NOTE — PATIENT INSTRUCTIONS
Feeding Tube Use & Care  Approved by the Patient Education Committee  Approval Date:  1/20/2021 Approval Number:      This guide has been developed to explain how to properly care for feeding tubes and to prevent problems like irritation, infection, or clogging.  Storage of Feeding Formula  Store unopened formula for tube feeding at room temperature. Do not let it freeze.  Opened cans or containers of formula should be labeled with the date that it was opened, covered and stored in the refrigerator.   Proper storage will reduce the risk for contamination and prevent spoilage.   Discard all unused formula from opened cans or containers after 24 hours.      Preparing to Use the Feeding Tube for Feeding or Medicine   If the feeding formula is stored in the refrigerator, allow the formula to stand at room temperature for 15 to 20 minutes before a feeding.   Very cold liquids may upset the stomach.  Sit up straight or position yourself so your shoulders are higher than your stomach.   Do not lie flat on your back.   Sitting upright and having your shoulders higher than your stomach can help reduce the risk for liquids accidentally getting into the lungs (aspiration) and stomach acid backing up from the stomach (reflux).  Always wash your hands with mild soap and warm water before touching the feeding equipment, the formula, or giving medicines.  Always flush the feeding tube with water before and after each feeding, and before and after administering medicines.   Flushing the tube keeps it clean and prevents the tube from clogging.  Call your health care team if you have diarrhea, constipation, nausea, vomiting, or skin irritation around the tube insertion site.    How to Clean Around Your Feeding Tube  Follow these directions once every day to clean around your feeding tube.  Wash your hands with mild soap and water and dry them with a clean towel.  Wet a soft, clean cloth or gauze with warm, soapy  water.  Gently wipe the skin around the tube with the cloth. Also clean the base of the tube.  Carefully clean the skin under the bumper with the cloth. Do not pull on the feeding tube.  Look for redness, swelling, bleeding, or leakage around the tube.  If you notice any of these things, report them to your health care team immediately. Do not wait another day.  Rinse the area you cleaned with clear, warm water. (It is also safe to rinse off in the shower.)  Pat the area dry with another clean, soft cloth.  Apply a protective skin barrier or antibacterial ointment to the area around tube if you have been told to do so by your health care team.  When you are finished, wash your hands again with mild soap and water and dry them with a clean towel.  For PEG tubes or G-Tubes Only   In addition to cleaning around the tube insertion site, gently push the base of the tube against the skin and twist the tube in a [place].   This step keeps the tube from sticking to the inside of the stomach.   Never twist a J-Tube or Jejunostomy tube.    How to Flush Your Feeding Tube  To keep your feeding tube clean and unclogged, you must flush it with warm water before and after feedings, and before and after medicines are administered.   Flushes also help you stay hydrated.   If you are not currently using your feeding tube for feedings or medicines, you will need to flush your feeding tube with 60 milliliters (mL) water at least once a day to keep the feeding tube clean and working.  Safety Alert: you should always check the temperature of the water before use.  Warm water should not measure more than 98.6OF. Because you may not measure the temperature of your water each time you flush, as a rule of thumb use room temperature water. Using hot water can cause injury such as burns to the lining of your stomach or intestines.    Use the following steps to flush your feeding tube before and after feedings.   Wash your hands with mild soap  and water and dry them with a clean towel.  Fill a clean container with warm water.  Place the tip of a large feeding syringe in the water.  Draw 60 mL of water into the syringe.  Pinch or bend the end of the feeding tube to keep the contents from leaking out.  Open the cap on the feeding tube.  Insert the tip of the syringe into the feeding tube.  Unbend the tube to allow the water to flow in.  Gently and slowly push the plunger of the syringe down.  When the syringe is empty, pinch or bend tube to keep contents from leaking out.  Then remove the syringe from the tube and close the cap on the feeding tube.  Tape the tube to your stomach with medical paper tape.  Wash your hands with mild soap and water and dry them with a clean towel.    How to Administer Medicines through a Feeding Tube  Wash your hands with mild soap and water and dry them with a clean towel. Check with your health care team before you take any medicine through a feeding tube.  Any medicine that is a solid, such as a tablet, given through the tube must be crushed thoroughly to a fine powder and dissolved in 15mL of warm water.  Some medicines should not be crushed (such as some extended-release medicines).   Different prescriptions or forms of the drug may be needed for use in a feeding tube (e.g., liquid).  You should always flush your feeding tube with at least 30 mL of water, before and after giving yourself your medicine.  Do not mix medicines together. You should give each medicine separately through your feeding tube and flush the tube with 5-15 mL of warm water between each medicine.    It is best to check with your pharmacist to see if any of your medicines need to be spaced out between administrations.  If you take fiber supplements, speak with your pharmacist to see if there is a substitution that can be given through your feeding tube.    If you are unable to substitute it you should speak with your dietitian to see if a different type  of tube feeding formula containing fiber is available.   If you need to take your medicine at the same time as your tube feeding, give the medicine half-way through the feeding. Never mix medicine with tube feeding formula.    Step-by-Step Instructions for Preparing Medicines to be Given through a Feeding Tube  Prepare each liquid medicine to be administered through your feeding tube and put it in a reachable but safe place.  Separately prepare each medicine that needs to be crushed before it is administered. Crush the medicine into a fine powder and place it in 15-30 mL of warm water for about 5 minutes before giving it through your feeding tube. Make sure the medicine is thoroughly dissolved in the water before giving it.   In a clean container, put enough warm water to flush the tube before and after each medicine is given. You will need at least ¾ cup of water for flushing, depending on the number of medicines you must give yourself.    To flush the feeding tube:  Pull the plunger out of your feeding syringe.  Bend or pinch the end of the feeding tube to prevent the contents from leaking out.  Open the cap on the feeding tube.  Put the tip of the syringe into the tube.  Fill the feeding syringe with:  Before administering your first medicine,  flush your tube with 30 mL of warm water  If you take more than one medicine, flush your tube with 5 -15 mL of warm water   After you have completed giving yourself your medicines, flush your tube with  30 mL of warm water   f. Unbend the feeding tube and let the water run into your feeding tube.  After the tube is flushed, pour the liquid or crushed-and-diluted medicine into feeding syringe.  Hold the syringe straight up and let the medicine run into the feeding tube.  Repeat step 4 to flush your tube again.  Repeat steps 2 through 7 for each medicine, giving water, then the medicine, then more water.  Remove the feeding syringe and close the feeding tube cap.     If you  have any questions or concerns about these guidelines or the steps to follow, please contact your doctor or healthcare provider.

## 2023-12-19 NOTE — PROGRESS NOTES
[facility] [place] - Outpatient Consultation  Marquise Mcgill 83 y.o. male MRN: [id number]  Encounter: [id number]          ASSESSMENT AND PLAN:      1. Severe protein-calorie malnutrition (HCC)  2. Oropharyngeal dysphagia  3. Severe dementia, unspecified dementia type, unspecified whether behavioral, psychotic, or mood disturbance or anxiety (HCC)  This is an 83-year-old male with history of severe dementia, moderate oropharyngeal dysphagia, multiple admissions for pneumonia presenting from [facility] for consultation to PEG tube placement.  He is currently on a purée, honey thick diet and nursing staff reports he ate 100% of his breakfast this morning and seems to like Magic cup supplements, however she knows that there are some days where he is not eating as well.  She does not know if he is tolerating his medications. His weight was 117 pounds during admission in October, no recent weight available for review.  Discussed risk/benefit of endoscopic PEG tube placement for supplemental nutrition with daughter.  PEG tube placement would provide supplemental nutrition, but would not change his other comorbid conditions and does not completely remove the risk of aspiration.  If he removes PEG tube prior to tract being healed, he would be at risk for infection/peritonitis.  Risk may be minimized by using an abdominal binder.  She would like to speak to her siblings to discuss PEG tube placement and will let the nursing home now.  If they decide to pursue PEG tube placement, nursing home can call to schedule.  Would recommend placement at [facility] and we would have to obtain cardiac clearance to hold Eliquis prior to procedure and placed referrals to complex care management as well as nutrition services.  Would recommend continuing aspiration precautions, encourage oral intake of foods and maximize nutrition with supplements that patient enjoys, and  continuing purée/honey thick diet in the interim.  -     Ambulatory referral to Nutrition Services; Future  -     Ambulatory referral to complex care management program; Future            ______________________________________________________________________    HPI:  Marquise Mcgill is a 83 y.o. male with severe dementia, dysphagia, recent admission for PNA, HTN, A-fib on Eliquis, and CAD, hilar mass here for PEG tube consultation.  He presents with an aide from [facility] at [facility] and his daughter.     He has had several admissions for pneumonia, most recently in October meeting sepsis criteria. He was treated with antibiotics and he saw speech therapy who performed video barium swallow which was significant for at least moderate oral and moderate pharyngeal dysphagia and he was recommended purée with honey thick diet and aspiration precautions.  He was 117 pounds during hospitalization. He was noted to have findings suspicious for left hilar mass on CT in June however his daughter deferred any further workup of this as they would not treat cancer if found.     Nursing home staff reports that patient is intermittently consuming food.  He ate 100% of his breakfast this morning.  He seems to like Magic cup supplements.  She is unsure how he has been tolerating his medications or what most recent weight is.  He denies any abdominal pain on exam.  He has no abdominal scars, no abdominal surgical history per daughter.    REVIEW OF SYSTEMS:    Unable to complete due to dementia      Historical Information   Past Medical History:   Diagnosis Date    Dementia (HCC)     Hypertension      History reviewed. No pertinent surgical history.  Social History   Social History     Substance and Sexual Activity   Alcohol Use Not Currently     Social History     Substance and Sexual Activity   Drug Use Not Currently     Social History     Tobacco Use   Smoking Status Unknown   Smokeless Tobacco Not on file     History  reviewed. No pertinent family history.    Meds/Allergies       Current Outpatient Medications:     apixaban (ELIQUIS) 2.5 mg    aspirin (ECOTRIN LOW STRENGTH) 81 mg EC tablet    donepezil (ARICEPT) 5 mg tablet    ipratropium-albuterol (DUO-NEB) 0.5-2.5 mg/3 mL nebulizer solution    levothyroxine 50 mcg tablet    losartan (COZAAR) 50 mg tablet    metoprolol tartrate (LOPRESSOR) 25 mg tablet    NIFEdipine (PROCARDIA XL) 30 mg 24 hr tablet    nitroglycerin (NITROSTAT) 0.4 mg SL tablet    polyvinyl alcohol (LIQUIFILM TEARS) 1.4 % ophthalmic solution    saccharomyces boulardii (FLORASTOR) 250 mg capsule    senna (SENOKOT) 8.6 MG tablet    tamsulosin (FLOMAX) 0.4 mg    lisinopril (ZESTRIL) 5 mg tablet    Allergies   Allergen Reactions    Shellfish-Derived Products - Food Allergy Other (See Comments)     Not able to assess           Objective     Blood pressure 101/68, pulse 88. There is no height or weight on file to calculate BMI.        PHYSICAL EXAM:      General Appearance:   Alert, cooperative, no distress oriented to self only   HEENT:   Normocephalic, atraumatic, anicteric.     Neck:  Supple, symmetrical, trachea midline   Lungs:   Clear to auscultation bilaterally; no rales, rhonchi or wheezing; respirations unlabored    Heart::   Regular rate and rhythm; no murmur.   Abdomen:   Soft, non-tender, non-distended; normal bowel sounds; no masses, no organomegaly no abdominal scars present   Genitalia:   Deferred    Rectal:   Deferred    Extremities:  No cyanosis, clubbing or edema    Skin:  No jaundice, rashes, or lesions    Lymph nodes:  No palpable cervical lymphadenopathy        Lab Results:   No visits with results within 1 Day(s) from this visit.   Latest known visit with results is:   No results displayed because visit has over 200 results.            Radiology Results:   No results found.